# Patient Record
Sex: MALE | Race: WHITE | Employment: OTHER | URBAN - METROPOLITAN AREA
[De-identification: names, ages, dates, MRNs, and addresses within clinical notes are randomized per-mention and may not be internally consistent; named-entity substitution may affect disease eponyms.]

---

## 2016-05-11 LAB — PSA SERPL-MCNC: 4.7 NG/ML

## 2016-12-07 LAB — PSA SERPL-MCNC: 5.7 NG/ML

## 2017-05-19 ENCOUNTER — HOSPITAL ENCOUNTER (OUTPATIENT)
Dept: LAB | Age: 77
Discharge: HOME | End: 2017-05-19
Payer: COMMERCIAL

## 2017-05-19 ENCOUNTER — OFFICE VISIT (OUTPATIENT)
Dept: FAMILY MEDICINE CLINIC | Age: 77
End: 2017-05-19

## 2017-05-19 VITALS
DIASTOLIC BLOOD PRESSURE: 80 MMHG | TEMPERATURE: 98 F | OXYGEN SATURATION: 97 % | RESPIRATION RATE: 17 BRPM | HEART RATE: 57 BPM | WEIGHT: 214 LBS | HEIGHT: 67 IN | BODY MASS INDEX: 33.59 KG/M2 | SYSTOLIC BLOOD PRESSURE: 130 MMHG

## 2017-05-19 DIAGNOSIS — E11.9 CONTROLLED TYPE 2 DIABETES MELLITUS WITHOUT COMPLICATION, WITHOUT LONG-TERM CURRENT USE OF INSULIN (HCC): ICD-10-CM

## 2017-05-19 DIAGNOSIS — I10 ESSENTIAL HYPERTENSION: ICD-10-CM

## 2017-05-19 DIAGNOSIS — Z12.83 SKIN CANCER SCREENING: ICD-10-CM

## 2017-05-19 DIAGNOSIS — R97.20 ELEVATED PSA: ICD-10-CM

## 2017-05-19 DIAGNOSIS — N18.30 CKD (CHRONIC KIDNEY DISEASE) STAGE 3, GFR 30-59 ML/MIN (HCC): ICD-10-CM

## 2017-05-19 DIAGNOSIS — E11.9 CONTROLLED TYPE 2 DIABETES MELLITUS WITHOUT COMPLICATION, WITHOUT LONG-TERM CURRENT USE OF INSULIN (HCC): Primary | ICD-10-CM

## 2017-05-19 DIAGNOSIS — Z00.00 ROUTINE GENERAL MEDICAL EXAMINATION AT A HEALTH CARE FACILITY: ICD-10-CM

## 2017-05-19 DIAGNOSIS — Z12.11 SCREEN FOR COLON CANCER: ICD-10-CM

## 2017-05-19 DIAGNOSIS — N40.1 BENIGN PROSTATIC HYPERPLASIA WITH LOWER URINARY TRACT SYMPTOMS, UNSPECIFIED MORPHOLOGY: ICD-10-CM

## 2017-05-19 LAB
ALBUMIN SERPL BCP-MCNC: 4.4 G/DL (ref 3.4–5)
ALBUMIN/GLOB SERPL: 1.5 {RATIO} (ref 0.8–1.7)
ALP SERPL-CCNC: 51 U/L (ref 45–117)
ALT SERPL-CCNC: 23 U/L (ref 16–61)
ANION GAP BLD CALC-SCNC: 11 MMOL/L (ref 3–18)
AST SERPL W P-5'-P-CCNC: 17 U/L (ref 15–37)
BILIRUB SERPL-MCNC: 0.6 MG/DL (ref 0.2–1)
BUN SERPL-MCNC: 48 MG/DL (ref 7–18)
BUN/CREAT SERPL: 25 (ref 12–20)
CALCIUM SERPL-MCNC: 10.1 MG/DL (ref 8.5–10.1)
CHLORIDE SERPL-SCNC: 107 MMOL/L (ref 100–108)
CHOLEST SERPL-MCNC: 167 MG/DL
CO2 SERPL-SCNC: 23 MMOL/L (ref 21–32)
CREAT SERPL-MCNC: 1.91 MG/DL (ref 0.6–1.3)
ERYTHROCYTE [DISTWIDTH] IN BLOOD BY AUTOMATED COUNT: 13.4 % (ref 11.6–14.5)
GLOBULIN SER CALC-MCNC: 3 G/DL (ref 2–4)
GLUCOSE SERPL-MCNC: 71 MG/DL (ref 74–99)
HBA1C MFR BLD: 7.3 % (ref 4.2–5.6)
HCT VFR BLD AUTO: 43.9 % (ref 36–48)
HDLC SERPL-MCNC: 53 MG/DL (ref 40–60)
HDLC SERPL: 3.2 {RATIO} (ref 0–5)
HGB BLD-MCNC: 14.1 G/DL (ref 13–16)
LDLC SERPL CALC-MCNC: 99.6 MG/DL (ref 0–100)
LIPID PROFILE,FLP: NORMAL
MCH RBC QN AUTO: 29.7 PG (ref 24–34)
MCHC RBC AUTO-ENTMCNC: 32.1 G/DL (ref 31–37)
MCV RBC AUTO: 92.4 FL (ref 74–97)
PLATELET # BLD AUTO: 221 K/UL (ref 135–420)
PMV BLD AUTO: 10.4 FL (ref 9.2–11.8)
POTASSIUM SERPL-SCNC: 5.8 MMOL/L (ref 3.5–5.5)
PROT SERPL-MCNC: 7.4 G/DL (ref 6.4–8.2)
RBC # BLD AUTO: 4.75 M/UL (ref 4.7–5.5)
SODIUM SERPL-SCNC: 141 MMOL/L (ref 136–145)
TRIGL SERPL-MCNC: 72 MG/DL (ref ?–150)
VLDLC SERPL CALC-MCNC: 14.4 MG/DL
WBC # BLD AUTO: 6.6 K/UL (ref 4.6–13.2)

## 2017-05-19 PROCEDURE — 84153 ASSAY OF PSA TOTAL: CPT | Performed by: INTERNAL MEDICINE

## 2017-05-19 PROCEDURE — 84154 ASSAY OF PSA FREE: CPT | Performed by: INTERNAL MEDICINE

## 2017-05-19 PROCEDURE — 80061 LIPID PANEL: CPT | Performed by: INTERNAL MEDICINE

## 2017-05-19 PROCEDURE — 36415 COLL VENOUS BLD VENIPUNCTURE: CPT | Performed by: INTERNAL MEDICINE

## 2017-05-19 PROCEDURE — 80053 COMPREHEN METABOLIC PANEL: CPT | Performed by: INTERNAL MEDICINE

## 2017-05-19 PROCEDURE — 83036 HEMOGLOBIN GLYCOSYLATED A1C: CPT | Performed by: INTERNAL MEDICINE

## 2017-05-19 PROCEDURE — 82043 UR ALBUMIN QUANTITATIVE: CPT | Performed by: INTERNAL MEDICINE

## 2017-05-19 PROCEDURE — 85027 COMPLETE CBC AUTOMATED: CPT | Performed by: INTERNAL MEDICINE

## 2017-05-19 RX ORDER — FUROSEMIDE 40 MG/1
40 TABLET ORAL DAILY
Qty: 30 TAB | Refills: 1 | Status: SHIPPED | OUTPATIENT
Start: 2017-05-19 | End: 2018-01-09 | Stop reason: SDUPTHER

## 2017-05-19 RX ORDER — NEBIVOLOL 10 MG/1
10 TABLET ORAL DAILY
Qty: 90 TAB | Refills: 1 | Status: SHIPPED | OUTPATIENT
Start: 2017-05-19 | End: 2017-12-25 | Stop reason: SDUPTHER

## 2017-05-19 RX ORDER — FLUTICASONE PROPIONATE 50 MCG
2 SPRAY, SUSPENSION (ML) NASAL DAILY
COMMUNITY
End: 2017-08-08 | Stop reason: SDUPTHER

## 2017-05-19 RX ORDER — GLIMEPIRIDE 4 MG/1
TABLET ORAL 2 TIMES DAILY
COMMUNITY
End: 2017-05-19 | Stop reason: SDUPTHER

## 2017-05-19 RX ORDER — GLIMEPIRIDE 4 MG/1
4 TABLET ORAL 2 TIMES DAILY
Qty: 90 TAB | Refills: 0 | Status: SHIPPED | OUTPATIENT
Start: 2017-05-19 | End: 2017-05-22 | Stop reason: SDUPTHER

## 2017-05-19 RX ORDER — SODIUM BICARBONATE 325 MG/1
325 TABLET ORAL DAILY
COMMUNITY
End: 2017-10-26 | Stop reason: SDUPTHER

## 2017-05-19 RX ORDER — LISINOPRIL 20 MG/1
TABLET ORAL DAILY
COMMUNITY
End: 2017-05-19 | Stop reason: SDUPTHER

## 2017-05-19 RX ORDER — GUAIFENESIN 100 MG/5ML
81 LIQUID (ML) ORAL DAILY
COMMUNITY

## 2017-05-19 RX ORDER — HYDRALAZINE HYDROCHLORIDE 100 MG/1
100 TABLET, FILM COATED ORAL 2 TIMES DAILY
Qty: 180 TAB | Refills: 0 | Status: SHIPPED | OUTPATIENT
Start: 2017-05-19 | End: 2017-08-16 | Stop reason: SDUPTHER

## 2017-05-19 RX ORDER — ALBUTEROL SULFATE 90 UG/1
AEROSOL, METERED RESPIRATORY (INHALATION)
COMMUNITY
End: 2018-06-06 | Stop reason: SDUPTHER

## 2017-05-19 RX ORDER — NEBIVOLOL 10 MG/1
TABLET ORAL DAILY
COMMUNITY
End: 2017-05-19 | Stop reason: SDUPTHER

## 2017-05-19 RX ORDER — PIOGLITAZONEHYDROCHLORIDE 30 MG/1
30 TABLET ORAL DAILY
Qty: 90 TAB | Refills: 0 | Status: SHIPPED | OUTPATIENT
Start: 2017-05-19 | End: 2017-08-07 | Stop reason: SDUPTHER

## 2017-05-19 RX ORDER — LISINOPRIL 20 MG/1
20 TABLET ORAL DAILY
Qty: 90 TAB | Refills: 1 | Status: SHIPPED | OUTPATIENT
Start: 2017-05-19 | End: 2017-12-03 | Stop reason: SDUPTHER

## 2017-05-19 RX ORDER — SILODOSIN 8 MG/1
8 CAPSULE ORAL
COMMUNITY
End: 2017-07-17 | Stop reason: ALTCHOICE

## 2017-05-19 RX ORDER — FUROSEMIDE 40 MG/1
TABLET ORAL DAILY
COMMUNITY
End: 2017-05-19 | Stop reason: SDUPTHER

## 2017-05-19 RX ORDER — ASCORBIC ACID 500 MG
1000 TABLET ORAL
COMMUNITY

## 2017-05-19 RX ORDER — HYDRALAZINE HYDROCHLORIDE 100 MG/1
TABLET, FILM COATED ORAL 2 TIMES DAILY
COMMUNITY
End: 2017-05-19 | Stop reason: SDUPTHER

## 2017-05-19 RX ORDER — PIOGLITAZONEHYDROCHLORIDE 30 MG/1
TABLET ORAL DAILY
COMMUNITY
End: 2017-05-19 | Stop reason: SDUPTHER

## 2017-05-19 RX ORDER — ATORVASTATIN CALCIUM 10 MG/1
TABLET, FILM COATED ORAL DAILY
COMMUNITY
End: 2017-05-19 | Stop reason: SDUPTHER

## 2017-05-19 RX ORDER — ATORVASTATIN CALCIUM 10 MG/1
10 TABLET, FILM COATED ORAL DAILY
Qty: 90 TAB | Refills: 1 | Status: SHIPPED | OUTPATIENT
Start: 2017-05-19 | End: 2017-10-13 | Stop reason: SDUPTHER

## 2017-05-19 NOTE — PROGRESS NOTES
1. Have you been to the ER, urgent care clinic since your last visit? Hospitalized since your last visit? No       2. Have you seen or consulted any other health care providers outside of the [address] [place] [address] since your last visit? Include any pap smears or colon screening. Yes, kidney specialists, ortho ,chiro, endo and internal medicine      Pt is fasting.

## 2017-05-19 NOTE — MR AVS SNAPSHOT
Visit Information Date & Time Provider Department Dept. Phone Encounter #  
 5/19/2017  9:15 AM Tamara , 3 [facility] [phone] [id number] Upcoming Health Maintenance Date Due  
 GLAUCOMA SCREENING Q2Y 6/2/2006 Pneumococcal 65+ Low/Medium Risk (1 of 2 - PCV13) 6/2/2006 MEDICARE YEARLY EXAM 6/2/2006 INFLUENZA AGE 9 TO ADULT 8/1/2017 DTaP/Tdap/Td series (2 - Td) 5/19/2027 Allergies as of 5/19/2017  Review Complete On: 5/19/2017 By: Tamara Choi MD  
  
 Severity Noted Reaction Type Reactions Norvasc [Amlodipine]  05/19/2017    Swelling Current Immunizations  Never Reviewed No immunizations on file. Not reviewed this visit You Were Diagnosed With   
  
 Codes Comments Controlled type 2 diabetes mellitus without complication, without long-term current use of insulin ([facility]    -  Primary ICD-10-CM: E11.9 ICD-9-CM: 250.00 Routine general medical examination at a health care facility     ICD-10-CM: Z00.00 ICD-9-CM: V70.0 CKD (chronic kidney disease) stage 3, GFR 30-59 ml/min     ICD-10-CM: N18.3 ICD-9-CM: 664. 3 Essential hypertension     ICD-10-CM: I10 
ICD-9-CM: 401.9 Elevated PSA     ICD-10-CM: R97.20 ICD-9-CM: 790.93 Skin cancer screening     ICD-10-CM: Z12.83 ICD-9-CM: V76.43 Benign prostatic hyperplasia with lower urinary tract symptoms, unspecified morphology     ICD-10-CM: N40.1 ICD-9-CM: 600.01 Screen for colon cancer     ICD-10-CM: Z12.11 ICD-9-CM: V76.51 Vitals BP Pulse Temp Resp Height(growth percentile) Weight(growth percentile) 130/80 (!) 57 98 °F (36.7 °C) (Oral) 17 5' 7.33\" (1.71 m) 214 lb (97.1 kg) SpO2 BMI Smoking Status 97% 33.19 kg/m2 Never Smoker Vitals History BMI and BSA Data Body Mass Index Body Surface Area  
 33.19 kg/m 2 2.15 m 2 Your Updated Medication List  
  
   
 This list is accurate as of: 5/19/17 10:13 AM.  Always use your most recent med list.  
  
  
  
  
 atorvastatin 10 mg tablet Commonly known as:  LIPITOR Take  by mouth daily. We Performed the Following REFERRAL TO DERMATOLOGY [REF19 Custom] REFERRAL TO ENDOCRINOLOGY [SSX99 Custom] Comments:  
 Please evaluate patient for DM2. REFERRAL TO GASTROENTEROLOGY [YOD42 Custom] REFERRAL TO NEPHROLOGY [EYG16 Custom] Comments:  
 Please evaluate patient for CKD in setting of DM and HTN. REFERRAL TO UROLOGY [PAI280 Custom] To-Do List   
 05/19/2017 Lab:  PSA W/ REFLX FREE PSA Referral Information Referral ID Referred By Referred To  
  
 [id number] [facility], [facility], [address] [place] [address] [address] [place] [address] Phone: [phone] Fax: [phone] Visits Status Start Date End Date 1 New Request 5/19/17 5/19/18 If your referral has a status of pending review or denied, additional information will be sent to support the outcome of this decision. Referral ID Referred By Referred To  
 [id number] Doc Lee V Not Available Visits Status Start Date End Date 1 New Request 5/19/17 5/19/18 If your referral has a status of pending review or denied, additional information will be sent to support the outcome of this decision. Referral ID Referred By Referred To  
 [id number] [facility], [address], [place]  
   [address] [place] [address] Phone: [phone] Fax: [phone] Visits Status Start Date End Date 1 New Request 5/19/17 5/19/18 If your referral has a status of pending review or denied, additional information will be sent to support the outcome of this decision. Referral ID Referred By Referred To  
 [id number] Doc Lee V Not Available Visits Status Start Date End Date 1 New Request 5/19/17 5/19/18 If your referral has a status of pending review or denied, additional information will be sent to support the outcome of this decision. Referral ID Referred By Referred To  
 [id number] [facility]  
   [address] [facility], [address] [place] [address] Phone: [phone] Fax: [phone] Visits Status Start Date End Date 1 New Request 5/19/17 5/19/18 If your referral has a status of pending review or denied, additional information will be sent to support the outcome of this decision. Introducing [facility]! Anuj Rg introduces [employer] patient portal. Now you can access parts of your medical record, email your doctor's office, and request medication refills online. 1. In your internet browser, go to [email or web address] 2. Click on the First Time User? Click Here link in the Sign In box. You will see the New Member Sign Up page. 3. Enter your [employer] Access Code exactly as it appears below. You will not need to use this code after youve completed the sign-up process. If you do not sign up before the expiration date, you must request a new code. · [employer] Access Code: [email or web address] Expires: 8/17/2017  8:28 AM 
 
4. Enter the last four digits of your Social Security Number (xxxx) and Date of Birth (mm/dd/yyyy) as indicated and click Submit. You will be taken to the next sign-up page. 5. Create a [employer] ID. This will be your [employer] login ID and cannot be changed, so think of one that is secure and easy to remember. 6. Create a [employer] password. You can change your password at any time. 7. Enter your Password Reset Question and Answer. This can be used at a later time if you forget your password. 8. Enter your e-mail address. You will receive e-mail notification when new information is available in [address] 9. Click Sign Up. You can now view and download portions of your medical record. 10. Click the Download Summary menu link to download a portable copy of your medical information. If you have questions, please visit the Frequently Asked Questions section of the [employer] website. Remember, [employer] is NOT to be used for urgent needs. For medical emergencies, dial 911. Now available from your iPhone and Android! Please provide this summary of care documentation to your next provider. Your primary care clinician is listed as Guy 13. If you have any questions after today's visit, please call [phone].

## 2017-05-19 NOTE — PROGRESS NOTES
Assessment/Plan:    Jessica Ramirez was seen today for establish care. Diagnoses and all orders for this visit:    Controlled type 2 diabetes mellitus without complication, without long-term current use of insulin ([address]- appear to be well controlled. Need ophtho eye note from 2/2017- pt to call with doctor name so we can request records  -     METABOLIC PANEL, COMPREHENSIVE; Future  -     LIPID PANEL; Future  -     CBC W/O DIFF; Future  -     HEMOGLOBIN A1C W/O EAG; Future  -     MICROALBUMIN, UR, RAND W/ MICROALBUMIN/CREA RATIO; Future  -     REFERRAL TO ENDOCRINOLOGY    Routine general medical examination at a health care facility  -     METABOLIC PANEL, COMPREHENSIVE; Future  -     LIPID PANEL; Future  -     CBC W/O DIFF; Future    CKD (chronic kidney disease) stage 3, GFR 30-59 ml/min  -     REFERRAL TO NEPHROLOGY    Essential hypertension- cont current regimen. Elevated PSA- reportedly biopsy neg. Get records. -     PSA W/ REFLX FREE PSA; Future  -     REFERRAL TO UROLOGY    Skin cancer screening  -     REFERRAL TO DERMATOLOGY    Benign prostatic hyperplasia with lower urinary tract symptoms, unspecified morphology  -     REFERRAL TO UROLOGY      The plan was discussed with the patient. The patient verbalized understanding and is in agreement with the plan. All medication potential side effects were discussed with the patient. Health Maintenance:   Health Maintenance   Topic Date Due    GLAUCOMA SCREENING Q2Y  06/02/2006    Pneumococcal 65+ Low/Medium Risk (1 of 2 - PCV13) 06/02/2006    MEDICARE YEARLY EXAM  06/02/2006    INFLUENZA AGE 9 TO ADULT  08/01/2017    DTaP/Tdap/Td series (2 - Td) 05/19/2027    ZOSTER VACCINE AGE 60>  Addressed       Elvis Cole is a 77 y.o.  male and presents with Establish Care     Subjective:  Pt is here to establish care. DM2-  On amaryl, januvia, pioglitazone. Fasting bs was 68 this am.  bs ranging 80-90 fasting. Last eye exam was 2/2017 [facility].  [facility] CKD - was being followed by nephrology in [facility]. HTN - on bystolic, hydralazine, lisinopril. bp well controlled. Intol of norvasc b/c of swelling. Has h/o elevated psa. Reportedly had biopsies that were negative from 1/2017. Has a slow urinary stream.  Empties bladder fully. +dribbling. On rapaflo- doesn't feel sx are completely controlled. HLD - on statin. No side effects. ROS:  Constitutional: No recent weight change. No weakness/fatigue. No f/c. Skin: No rashes, change in nails/hair, itching   HENT: No HA, dizziness. No hearing loss/tinnitus. No nasal congestion/discharge. Eyes: No change in vision, double/blurred vision or eye pain/redness. Cardiovascular: No CP/palpitations. No TORO/orthopnea/PND. Respiratory: No cough/sputum, dyspnea, wheezing. Gastointestinal: No dysphagia, reflux. No n/v. No constipation/diarrhea. No melena/rectal bleeding. Genitourinary: No dysuria, urinary hesitancy, nocturia, hematuria. No incontinence. Musculoskeletal: No joint pain/stiffness. No muscle pain/tenderness. Endo: No heat/cold intolerance, no polyuria/polydypsia. Heme: No h/o anemia. No easy bleeding/bruising. Allergy/Immunology: No seasonal rhinitis. Denies frequent colds, sinus/ear infections. Neurological: No seizures/numbness/weakness. No paresthesias. Psychiatric:  No depression, anxiety.    PMH:  Past Medical History:   Diagnosis Date    Calculus of kidney     Chronic kidney disease     [facility]     Hypercholesterolemia     Hypertension        PSH:  Past Surgical History:   Procedure Laterality Date    HX APPENDECTOMY      16years old    Orien Haus HX [de-identified]  80s    r shoulder        SH:  Social History   Substance Use Topics    Smoking status: Never Smoker    Smokeless tobacco: Never Used    Alcohol use No       FH:  Family History   Problem Relation Age of Onset    Kidney Disease Mother     Stroke Father        Medications/Allergies:  No current outpatient prescriptions on file. No Known Allergies    Objective:  Visit Vitals    /80    Pulse (!) 57    Temp 98 °F (36.7 °C) (Oral)    Resp 17    Ht 5' 7.33\" (1.71 m)    Wt 214 lb (97.1 kg)    SpO2 97%    BMI 33.19 kg/m2      Constitutional: Well developed, nourished, no distress, alert, obese habitus   HENT: Exterior ears and tympanic membranes normal bilaterally. Supple neck. No thyromegaly or lymphadenopathy. Oropharynx clear and moist mucous membranes. Eyes: Conjunctiva normal. PERRL. Cardiovascular: S1, S2.  RRR. No murmurs/rubs. No thrills palpated. No carotid bruits. Intact distal pulses. No edema. Pulmonary/Chest Wall: No abnormalities on inspection. Clear to auscultation bilaterally. No wheezing/rhonchi. Normal effort. GI: Soft, nontender, nondistended. Normal active bowel sounds. No  masses on palpation. No hepatosplenomegaly. Musculoskeletal: Gait normal.  Joints without deformity/tenderness. Neurological: Appropriate. No focal motor or sensory deficits. Speech normal.   Skin: No lesions/rashes on inspection. Psych: Appropriate affect, judgement and insight. Short-term memory intact.

## 2017-05-20 LAB
% FREE PSA, 480797: 43.5 %
CREAT UR-MCNC: 191.52 MG/DL (ref 30–125)
MICROALBUMIN UR-MCNC: 33.7 MG/DL (ref 0–3)
MICROALBUMIN/CREAT UR-RTO: 176 MG/G (ref 0–30)
PSA SERPL-MCNC: 4.8 NG/ML (ref 0–4)
PSA, FREE, 480853: 2.09 NG/ML
REFLEX CRITERIA: ABNORMAL

## 2017-05-22 DIAGNOSIS — E11.9 CONTROLLED TYPE 2 DIABETES MELLITUS WITHOUT COMPLICATION, WITHOUT LONG-TERM CURRENT USE OF INSULIN (HCC): ICD-10-CM

## 2017-05-22 DIAGNOSIS — E87.5 HYPERKALEMIA: Primary | ICD-10-CM

## 2017-05-22 PROBLEM — N40.1 BENIGN PROSTATIC HYPERPLASIA WITH LOWER URINARY TRACT SYMPTOMS: Status: ACTIVE | Noted: 2017-05-22

## 2017-05-22 PROBLEM — I10 ESSENTIAL HYPERTENSION: Status: ACTIVE | Noted: 2017-05-22

## 2017-05-22 PROBLEM — N18.30 CKD (CHRONIC KIDNEY DISEASE) STAGE 3, GFR 30-59 ML/MIN (HCC): Status: ACTIVE | Noted: 2017-05-22

## 2017-05-22 PROBLEM — R97.20 ELEVATED PSA: Status: ACTIVE | Noted: 2017-05-22

## 2017-05-22 RX ORDER — GLIMEPIRIDE 4 MG/1
4 TABLET ORAL
Qty: 90 TAB | Refills: 0 | Status: SHIPPED | OUTPATIENT
Start: 2017-05-22 | End: 2018-01-06 | Stop reason: SDUPTHER

## 2017-05-22 NOTE — PROGRESS NOTES
Tell pt his labs showed his prostate level is mildlly high. I'd like him to see the urologist I referred him to for f/u on that issue. His DM is controlled. His potassium was a little high, so I want him to come back this week for a repeat.

## 2017-05-22 NOTE — PROGRESS NOTES
Call placed to patient, notified. Patient's wife states patient has been exercising and behaving so his BS in the am is 60-80's. Would like to know if meds need to be adjusted.

## 2017-05-26 ENCOUNTER — HOSPITAL ENCOUNTER (OUTPATIENT)
Dept: LAB | Age: 77
Discharge: HOME | End: 2017-05-26
Payer: COMMERCIAL

## 2017-05-26 DIAGNOSIS — E87.5 HYPERKALEMIA: ICD-10-CM

## 2017-05-26 LAB — POTASSIUM SERPL-SCNC: 5.1 MMOL/L (ref 3.5–5.5)

## 2017-05-26 PROCEDURE — 84132 ASSAY OF SERUM POTASSIUM: CPT | Performed by: INTERNAL MEDICINE

## 2017-05-26 PROCEDURE — 36415 COLL VENOUS BLD VENIPUNCTURE: CPT | Performed by: INTERNAL MEDICINE

## 2017-06-01 PROBLEM — E53.8 B12 DEFICIENCY: Status: ACTIVE | Noted: 2017-06-01

## 2017-07-18 ENCOUNTER — TELEPHONE (OUTPATIENT)
Dept: FAMILY MEDICINE CLINIC | Age: 77
End: 2017-07-18

## 2017-07-18 NOTE — TELEPHONE ENCOUNTER
[facility] called to report that they have tried several times to contact the pt and he has not called back to schedule an appt.

## 2017-08-08 RX ORDER — FLUTICASONE PROPIONATE 50 MCG
2 SPRAY, SUSPENSION (ML) NASAL DAILY
Qty: 1 BOTTLE | Refills: 0 | Status: SHIPPED | OUTPATIENT
Start: 2017-08-08 | End: 2017-12-18 | Stop reason: SDUPTHER

## 2017-08-16 DIAGNOSIS — I10 ESSENTIAL HYPERTENSION: ICD-10-CM

## 2017-08-17 RX ORDER — HYDRALAZINE HYDROCHLORIDE 50 MG/1
50 TABLET, FILM COATED ORAL 2 TIMES DAILY
Qty: 180 TAB | Refills: 0 | Status: SHIPPED | OUTPATIENT
Start: 2017-08-17 | End: 2018-01-06 | Stop reason: SDUPTHER

## 2017-10-13 DIAGNOSIS — E11.9 CONTROLLED TYPE 2 DIABETES MELLITUS WITHOUT COMPLICATION, WITHOUT LONG-TERM CURRENT USE OF INSULIN (HCC): ICD-10-CM

## 2017-10-16 RX ORDER — ATORVASTATIN CALCIUM 10 MG/1
TABLET, FILM COATED ORAL
Qty: 90 TAB | Refills: 0 | Status: SHIPPED | OUTPATIENT
Start: 2017-10-16 | End: 2018-01-06 | Stop reason: SDUPTHER

## 2017-10-16 RX ORDER — SITAGLIPTIN 50 MG/1
TABLET, FILM COATED ORAL
Qty: 90 TAB | Refills: 0 | Status: SHIPPED | OUTPATIENT
Start: 2017-10-16 | End: 2018-01-06 | Stop reason: SDUPTHER

## 2017-10-24 RX ORDER — SODIUM BICARBONATE 325 MG/1
325 TABLET ORAL DAILY
OUTPATIENT
Start: 2017-10-24

## 2017-10-26 RX ORDER — SODIUM BICARBONATE 325 MG/1
325 TABLET ORAL DAILY
Qty: 30 TAB | Refills: 0 | Status: SHIPPED | OUTPATIENT
Start: 2017-10-26 | End: 2017-10-31 | Stop reason: SDUPTHER

## 2017-10-26 NOTE — TELEPHONE ENCOUNTER
Called pt. He has not set up with a kidney specialist here yet. His last visit was 1 year ago at his nephrologist in [facility]. Please send temporary fill and add a referral to [facility]. Pt will call for appt.

## 2017-10-31 NOTE — TELEPHONE ENCOUNTER
Patients wife is calling stating that the prescription for the sodium Bicarbonate 325mg for a 90 day supply was sent to [facility] . Called [facility] and they stated that this medication is an OTC and they do not carry this. Patient would have to have the script sent over to the [address] [place] [address] (p) [phone].

## 2017-11-01 NOTE — TELEPHONE ENCOUNTER
Pt wife calling again wanting to know when prescription will be sent to [facility]. She states he is completely out of medication and [facility] will not fill and it needs to be sent to [facility] as soon as possible. Pt would like a call back from nurse.  Please f/u

## 2017-11-02 RX ORDER — SODIUM BICARBONATE 325 MG/1
325 TABLET ORAL DAILY
Qty: 90 TAB | Refills: 0 | Status: SHIPPED | OUTPATIENT
Start: 2017-11-02 | End: 2019-09-04 | Stop reason: SDUPTHER

## 2017-12-01 ENCOUNTER — TELEPHONE (OUTPATIENT)
Dept: FAMILY MEDICINE CLINIC | Age: 77
End: 2017-12-01

## 2017-12-01 DIAGNOSIS — E53.8 B12 DEFICIENCY: ICD-10-CM

## 2017-12-01 DIAGNOSIS — E11.9 CONTROLLED TYPE 2 DIABETES MELLITUS WITHOUT COMPLICATION, WITHOUT LONG-TERM CURRENT USE OF INSULIN (HCC): Primary | ICD-10-CM

## 2017-12-01 DIAGNOSIS — R97.20 ELEVATED PSA: ICD-10-CM

## 2017-12-01 DIAGNOSIS — I10 ESSENTIAL HYPERTENSION: ICD-10-CM

## 2017-12-01 DIAGNOSIS — N18.30 CKD (CHRONIC KIDNEY DISEASE) STAGE 3, GFR 30-59 ML/MIN (HCC): ICD-10-CM

## 2017-12-01 NOTE — TELEPHONE ENCOUNTER
Pt called and would like his blood work to be ordered. The last he had bw done was 5/19/17. Please review and order accordingly.

## 2017-12-03 DIAGNOSIS — I10 ESSENTIAL HYPERTENSION: ICD-10-CM

## 2017-12-04 RX ORDER — LISINOPRIL 20 MG/1
TABLET ORAL
Qty: 90 TAB | Refills: 0 | Status: SHIPPED | OUTPATIENT
Start: 2017-12-04 | End: 2017-12-05 | Stop reason: SDUPTHER

## 2017-12-05 DIAGNOSIS — I10 ESSENTIAL HYPERTENSION: ICD-10-CM

## 2017-12-05 RX ORDER — LISINOPRIL 10 MG/1
TABLET ORAL
COMMUNITY
Start: 2017-12-05 | End: 2018-06-28 | Stop reason: ALTCHOICE

## 2017-12-06 ENCOUNTER — TELEPHONE (OUTPATIENT)
Dept: FAMILY MEDICINE CLINIC | Age: 77
End: 2017-12-06

## 2017-12-06 NOTE — TELEPHONE ENCOUNTER
Called in reporting that they do not take this patient's insurance, he has a medicare advantage plan. Will need to be set up elsewhere.

## 2017-12-08 NOTE — TELEPHONE ENCOUNTER
Pt called stating he went to his appt at [facility] and was told they do not accept his insurance. Pt is requesting a referral to a new endocrinologist if possible.

## 2017-12-12 NOTE — TELEPHONE ENCOUNTER
Called Dr. Karina Benavides office. They will review the card and let us know if they accept his insurance. p [phone].

## 2017-12-18 RX ORDER — FLUTICASONE PROPIONATE 50 MCG
2 SPRAY, SUSPENSION (ML) NASAL DAILY
Qty: 1 BOTTLE | Refills: 0 | Status: SHIPPED | OUTPATIENT
Start: 2017-12-18 | End: 2018-01-06 | Stop reason: SDUPTHER

## 2017-12-21 ENCOUNTER — OFFICE VISIT (OUTPATIENT)
Dept: FAMILY MEDICINE CLINIC | Age: 77
End: 2017-12-21

## 2017-12-21 ENCOUNTER — HOSPITAL ENCOUNTER (OUTPATIENT)
Dept: LAB | Age: 77
Discharge: HOME | End: 2017-12-21
Payer: COMMERCIAL

## 2017-12-21 VITALS
BODY MASS INDEX: 34.81 KG/M2 | OXYGEN SATURATION: 99 % | TEMPERATURE: 98 F | HEART RATE: 53 BPM | DIASTOLIC BLOOD PRESSURE: 60 MMHG | WEIGHT: 221.8 LBS | HEIGHT: 67 IN | RESPIRATION RATE: 18 BRPM | SYSTOLIC BLOOD PRESSURE: 118 MMHG

## 2017-12-21 DIAGNOSIS — I10 ESSENTIAL HYPERTENSION: ICD-10-CM

## 2017-12-21 DIAGNOSIS — Z23 ENCOUNTER FOR IMMUNIZATION: ICD-10-CM

## 2017-12-21 DIAGNOSIS — L30.9 DERMATITIS: ICD-10-CM

## 2017-12-21 DIAGNOSIS — E66.09 CLASS 1 OBESITY DUE TO EXCESS CALORIES WITH SERIOUS COMORBIDITY AND BODY MASS INDEX (BMI) OF 34.0 TO 34.9 IN ADULT: ICD-10-CM

## 2017-12-21 DIAGNOSIS — E11.9 CONTROLLED TYPE 2 DIABETES MELLITUS WITHOUT COMPLICATION, WITHOUT LONG-TERM CURRENT USE OF INSULIN (HCC): Primary | ICD-10-CM

## 2017-12-21 DIAGNOSIS — E11.9 CONTROLLED TYPE 2 DIABETES MELLITUS WITHOUT COMPLICATION, WITHOUT LONG-TERM CURRENT USE OF INSULIN (HCC): ICD-10-CM

## 2017-12-21 DIAGNOSIS — E53.8 B12 DEFICIENCY: ICD-10-CM

## 2017-12-21 DIAGNOSIS — R97.20 ELEVATED PSA: ICD-10-CM

## 2017-12-21 DIAGNOSIS — H04.123 DRY EYES: ICD-10-CM

## 2017-12-21 DIAGNOSIS — N18.30 CKD (CHRONIC KIDNEY DISEASE) STAGE 3, GFR 30-59 ML/MIN (HCC): ICD-10-CM

## 2017-12-21 PROBLEM — K80.21 CALCULUS OF GALLBLADDER WITH BILIARY OBSTRUCTION BUT WITHOUT CHOLECYSTITIS: Status: ACTIVE | Noted: 2017-12-21

## 2017-12-21 PROBLEM — E11.21 TYPE 2 DIABETES MELLITUS WITH NEPHROPATHY (HCC): Status: ACTIVE | Noted: 2017-12-21

## 2017-12-21 PROBLEM — E11.21 DIABETIC NEPHROPATHY ASSOCIATED WITH TYPE 2 DIABETES MELLITUS (HCC): Status: ACTIVE | Noted: 2017-12-21

## 2017-12-21 PROBLEM — E11.21 TYPE 2 DIABETES MELLITUS WITH NEPHROPATHY (HCC): Status: RESOLVED | Noted: 2017-12-21 | Resolved: 2017-12-21

## 2017-12-21 LAB
ALBUMIN SERPL-MCNC: 4.4 G/DL (ref 3.4–5)
ALBUMIN/GLOB SERPL: 1.5 {RATIO} (ref 0.8–1.7)
ALP SERPL-CCNC: 55 U/L (ref 45–117)
ALT SERPL-CCNC: 23 U/L (ref 16–61)
ANION GAP SERPL CALC-SCNC: 11 MMOL/L (ref 3–18)
AST SERPL-CCNC: 14 U/L (ref 15–37)
BILIRUB SERPL-MCNC: 0.6 MG/DL (ref 0.2–1)
BUN SERPL-MCNC: 43 MG/DL (ref 7–18)
BUN/CREAT SERPL: 23 (ref 12–20)
CALCIUM SERPL-MCNC: 9.3 MG/DL (ref 8.5–10.1)
CHLORIDE SERPL-SCNC: 109 MMOL/L (ref 100–108)
CO2 SERPL-SCNC: 23 MMOL/L (ref 21–32)
CREAT SERPL-MCNC: 1.83 MG/DL (ref 0.6–1.3)
GLOBULIN SER CALC-MCNC: 3 G/DL (ref 2–4)
GLUCOSE SERPL-MCNC: 120 MG/DL (ref 74–99)
HBA1C MFR BLD HPLC: 7.2 %
HBA1C MFR BLD: 7.3 % (ref 4.2–5.6)
POTASSIUM SERPL-SCNC: 5.3 MMOL/L (ref 3.5–5.5)
PROT SERPL-MCNC: 7.4 G/DL (ref 6.4–8.2)
SODIUM SERPL-SCNC: 143 MMOL/L (ref 136–145)
VIT B12 SERPL-MCNC: >2000 PG/ML (ref 211–911)

## 2017-12-21 PROCEDURE — 84153 ASSAY OF PSA TOTAL: CPT | Performed by: INTERNAL MEDICINE

## 2017-12-21 PROCEDURE — 82607 VITAMIN B-12: CPT | Performed by: INTERNAL MEDICINE

## 2017-12-21 PROCEDURE — 83036 HEMOGLOBIN GLYCOSYLATED A1C: CPT | Performed by: INTERNAL MEDICINE

## 2017-12-21 PROCEDURE — 80053 COMPREHEN METABOLIC PANEL: CPT | Performed by: INTERNAL MEDICINE

## 2017-12-21 PROCEDURE — 84154 ASSAY OF PSA FREE: CPT | Performed by: INTERNAL MEDICINE

## 2017-12-21 PROCEDURE — 36415 COLL VENOUS BLD VENIPUNCTURE: CPT | Performed by: INTERNAL MEDICINE

## 2017-12-21 RX ORDER — TRIAMCINOLONE ACETONIDE 1 MG/G
CREAM TOPICAL 2 TIMES DAILY
Qty: 453 G | Refills: 0 | Status: SHIPPED | OUTPATIENT
Start: 2017-12-21 | End: 2018-04-12 | Stop reason: SDUPTHER

## 2017-12-21 RX ORDER — AZELASTINE HYDROCHLORIDE 0.5 MG/ML
1 SOLUTION/ DROPS OPHTHALMIC 2 TIMES DAILY
Qty: 6 ML | Refills: 0 | Status: SHIPPED | OUTPATIENT
Start: 2017-12-21 | End: 2018-07-09

## 2017-12-21 NOTE — MR AVS SNAPSHOT
Visit Information Date & Time Provider Department Dept. Phone Encounter #  
 12/21/2017  7:30 AM Nicanor Allison, [facility] [phone] [id number] Your Appointments 1/31/2018  8:30 AM  
Nurse Visit with Parisa Soliz [facility] NURSE [facility]. [address]) Appt Note: Return in about 6 months (around 1/17/2018) for PSA. [address] [facility] [facility] [address]  
  
    
 2/7/2018  8:15 AM  
ESTABLISHED PATIENT with Zaid Taylor MD  
[facility]. [address] [facility]) Appt Note: 6 month f/u  
 [address] [address]  
[phone]  
  
   
 [facility] [address] Upcoming Health Maintenance Date Due  
 EYE EXAM RETINAL OR DILATED Q1 6/2/1950 GLAUCOMA SCREENING Q2Y 6/2/2006 MEDICARE YEARLY EXAM 6/2/2006 Influenza Age 5 to Adult 8/1/2017 MICROALBUMIN Q1 5/19/2018 LIPID PANEL Q1 5/19/2018 HEMOGLOBIN A1C Q6M 6/21/2018 FOOT EXAM Q1 12/21/2018 DTaP/Tdap/Td series (2 - Td) 5/19/2027 Allergies as of 12/21/2017  Review Complete On: 12/21/2017 By: Nicanor Cooper MD  
  
 Severity Noted Reaction Type Reactions Norvasc [Amlodipine]  05/19/2017    Swelling Current Immunizations  Never Reviewed Name Date Influenza High Dose Vaccine PF  Incomplete Pneumococcal Conjugate (PCV-13) 11/7/2016 Pneumococcal Polysaccharide (PPSV-23) 5/1/2010 Zoster Vaccine, Live 10/1/2012 Not reviewed this visit You Were Diagnosed With   
  
 Codes Comments Controlled type 2 diabetes mellitus without complication, without long-term current use of insulin ([facility]    -  Primary ICD-10-CM: E11.9 ICD-9-CM: 250.00 Essential hypertension     ICD-10-CM: I10 
ICD-9-CM: 401.9  Class 1 obesity due to excess calories with serious comorbidity and body mass index (BMI) of 34.0 to 34.9 in adult     ICD-10-CM: E66.09, Z68.34 
ICD-9-CM: 278.00, V85.34 Encounter for immunization     ICD-10-CM: P06 ICD-9-CM: V03.89 Vitals BP Pulse Temp Resp Height(growth percentile) Weight(growth percentile)  
 118/60 (BP 1 Location: Left arm, BP Patient Position: Sitting) (!) 53 98 °F (36.7 °C) (Oral) 18 5' 7\" (1.702 m) 221 lb 12.8 oz (100.6 kg) SpO2 BMI Smoking Status 99% 34.74 kg/m2 Never Smoker Vitals History BMI and BSA Data Body Mass Index Body Surface Area 34.74 kg/m 2 2.18 m 2 Preferred Pharmacy Pharmacy Name Phone [facility] [address] Your Updated Medication List  
  
   
This list is accurate as of: 12/21/17  8:29 AM.  Always use your most recent med list.  
  
  
  
  
 aspirin 81 mg chewable tablet Take 81 mg by mouth daily. atorvastatin 10 mg tablet Commonly known as:  LIPITOR  
TAKE 1 TABLET BY MOUTH  DAILY  
  
 azelastine 0.05 % ophthalmic solution Commonly known as:  OPTIVAR Administer 1 Drop to both eyes two (2) times a day. Use in affected eye(s)  
  
 cranberry extract 450 mg Tab tablet Take  by mouth. fluticasone 50 mcg/actuation nasal spray Commonly known as:  Tana Fetch 2 Sprays by Both Nostrils route daily. furosemide 40 mg tablet Commonly known as:  LASIX Take 1 Tab by mouth daily. Indications: Monday Wednesday Friday  
  
 glimepiride 4 mg tablet Commonly known as:  AMARYL Take 1 Tab by mouth every morning. glucose blood VI test strips strip Commonly known as:  ONETOUCH ULTRA TEST Test sugars daily. E11.9  
  
 hydrALAZINE 50 mg tablet Commonly known as:  APRESOLINE Take 1 Tab by mouth two (2) times a day. JANUVIA 50 mg tablet Generic drug:  SITagliptin TAKE 1 TABLET BY MOUTH  DAILY  
  
 lisinopril 10 mg tablet Commonly known as:  Mela Belle  
 TAKE 1 TABLET BY MOUTH  DAILY  
  
 nebivolol 10 mg tablet Commonly known as:  BYSTOLIC Take 1 Tab by mouth daily. OTHER Indications: Osteo Bi-Flex 1 per day  
  
 pioglitazone 30 mg tablet Commonly known as:  ACTOS Take 1 tablet by mouth  daily  
  
 sodium bicarbonate 325 mg tablet Take 1 Tab by mouth daily. tamsulosin 0.4 mg capsule Commonly known as:  FLOMAX Take 1 Cap by mouth daily (after dinner). triamcinolone acetonide 0.1 % topical cream  
Commonly known as:  KENALOG Apply  to affected area two (2) times a day. use thin layer VENTOLIN HFA 90 mcg/actuation inhaler Generic drug:  albuterol Take  by inhalation. VITAMIN C 500 mg tablet Generic drug:  ascorbic acid (vitamin C) Take 1,000 mg by mouth. Prescriptions Sent to Pharmacy Refills  
 glucose blood VI test strips (ONETOUCH ULTRA TEST) strip 3 Sig: Test sugars daily. E11.9 Class: Normal  
 Pharmacy: [address] [place] [address] Ph #: [phone]  
 triamcinolone acetonide (KENALOG) 0.1 % topical cream 0 Sig: Apply  to affected area two (2) times a day. use thin layer Class: Normal  
 Pharmacy: [address] Ph #: [phone] Route: Topical  
 azelastine (OPTIVAR) 0.05 % ophthalmic solution 0 Sig: Administer 1 Drop to both eyes two (2) times a day. Use in affected eye(s) Class: Normal  
 Pharmacy: [address] Ph #: [phone] Route: Both Eyes We Performed the Following ADMIN INFLUENZA VIRUS VAC [ [facility]] AMB POC HEMOGLOBIN A1C [90947 CPT(R)]  DIABETES FOOT EXAM [HM7 Custom] INFLUENZA VIRUS VACCINE, HIGH DOSE SEASONAL, PRESERVATIVE FREE [88669 CPT(R)] Patient Instructions You have been referred to ophthalmology.   Please call one of the preferred providers listed below and schedule your appointment. Once you have scheduled your appointment, please call the office at [phone] and leave the details of your appointment (provider you will be seeing, appointment date and time) on the voice mail. [facility] Dr. Sandra Sumner [address]! Dear Janeth Patient: Thank you for requesting a [employer] account. Our records indicate that you already have an active [employer] account. You can access your account anytime at [email or web address] Did you know that you can access your hospital and ER discharge instructions at any time in [employer]? You can also review all of your test results from your hospital stay or ER visit. Additional Information If you have questions, please visit the Frequently Asked Questions section of the [employer] website at [email or web address] Remember, [employer] is NOT to be used for urgent needs. For medical emergencies, dial 911. Now available from your iPhone and Android! Please provide this summary of care documentation to your next provider. Your primary care clinician is listed as Guy Lin. If you have any questions after today's visit, please call [phone].

## 2017-12-21 NOTE — PROGRESS NOTES
Assessment/Plan:    1. Controlled type 2 diabetes mellitus without complication, without long-term current use of insulin (HCC)  -cont current regimen. Has endo appt 3/2018  -  DIABETES FOOT EXAM  - AMB POC HEMOGLOBIN A1C  - glucose blood VI test strips (ONETOUCH ULTRA TEST) strip; Test sugars daily. E11.9  Dispense: 100 Strip; Refill: 3    2. Essential hypertension  -cont current    3. Class 1 obesity due to excess calories with serious comorbidity and body mass index (BMI) of 34.0 to 34.9 in adult  -work on wt loss    4. Encounter for immunization  - Influenza virus vaccine ([address]) 72 years and older ([address])  - Administration fee () for Medicare insured patients    5. Dry eyes - azelastine. Try [facility] for [facility] appt    6. Dermatitis - trial kenalog  The plan was discussed with the patient. The patient verbalized understanding and is in agreement with the plan. All medication potential side effects were discussed with the patient. Health Maintenance:  Has eye appt 3/2018. Health Maintenance   Topic Date Due    EYE EXAM RETINAL OR DILATED Q1  06/02/1950    GLAUCOMA SCREENING Q2Y  06/02/2006    MEDICARE YEARLY EXAM  06/02/2006    Influenza Age 5 to Adult  08/01/2017    MICROALBUMIN Q1  05/19/2018    LIPID PANEL Q1  05/19/2018    HEMOGLOBIN A1C Q6M  06/21/2018    FOOT EXAM Q1  12/21/2018    DTaP/Tdap/Td series (2 - Td) 05/19/2027    ZOSTER VACCINE AGE 60>  Addressed    Pneumococcal 65+ Low/Medium Risk  Completed       Yoana Paris is a 69 y.o. male and presents with Diabetes     Subjective:  DM2 - A1c 7.2. Compliant with meds. HTN -bp controlled. Compliant with meds. No side effects. He notes a lack of \"endurance\" on treadmill (30min). Is working himself up. No cp or dyspnea. Is working on losing wt. He also c/o rash and itchy skin on back. He also c/o itching eyes. ROS:  Constitutional: No recent weight change. No weakness/fatigue. No f/c. Skin: No rashes, change in nails/hair, itching   HENT: No HA, dizziness. No hearing loss/tinnitus. No nasal congestion/discharge. Eyes: No change in vision, double/blurred vision or eye pain/redness. Cardiovascular: No CP/palpitations. No TORO/orthopnea/PND. Respiratory: No cough/sputum, dyspnea, wheezing. Gastointestinal: No dysphagia, reflux. No n/v. No constipation/diarrhea. No melena/rectal bleeding. Genitourinary: No dysuria, urinary hesitancy, nocturia, hematuria. No incontinence. Musculoskeletal: No joint pain/stiffness. No muscle pain/tenderness. Endo: No heat/cold intolerance, no polyuria/polydypsia. Heme: No h/o anemia. No easy bleeding/bruising. Allergy/Immunology: No seasonal rhinitis. Denies frequent colds, sinus/ear infections. Neurological: No seizures/numbness/weakness. No paresthesias. Psychiatric:  No depression, anxiety. The problem list was updated as a part of today's visit. Patient Active Problem List   Diagnosis Code    Controlled type 2 diabetes mellitus without complication, without long-term current use of insulin ([facility]) E11.9    CKD (chronic kidney disease) stage 3, GFR 30-59 ml/min N18.3    Essential hypertension I10    Elevated PSA R97.20    Benign prostatic hyperplasia with lower urinary tract symptoms N40.1    B12 deficiency E53.8       The PSH, FH were reviewed. SH:  Social History   Substance Use Topics    Smoking status: Never Smoker    Smokeless tobacco: Never Used    Alcohol use No       Medications/Allergies:  Current Outpatient Prescriptions on File Prior to Visit   Medication Sig Dispense Refill    fluticasone (FLONASE) 50 mcg/actuation nasal spray 2 Sprays by Both Nostrils route daily. 1 Bottle 0    lisinopril (PRINIVIL, ZESTRIL) 10 mg tablet TAKE 1 TABLET BY MOUTH  DAILY      sodium bicarbonate 325 mg tablet Take 1 Tab by mouth daily.  90 Tab 0    JANUVIA 50 mg tablet TAKE 1 TABLET BY MOUTH  DAILY 90 Tab 0    atorvastatin (LIPITOR) 10 mg tablet TAKE 1 TABLET BY MOUTH  DAILY 90 Tab 0    tamsulosin (FLOMAX) 0.4 mg capsule Take 1 Cap by mouth daily (after dinner). 90 Cap 3    hydrALAZINE (APRESOLINE) 50 mg tablet Take 1 Tab by mouth two (2) times a day. 180 Tab 0    pioglitazone (ACTOS) 30 mg tablet Take 1 tablet by mouth  daily 90 Tab 0    glimepiride (AMARYL) 4 mg tablet Take 1 Tab by mouth every morning. 90 Tab 0    albuterol (VENTOLIN HFA) 90 mcg/actuation inhaler Take  by inhalation.  aspirin 81 mg chewable tablet Take 81 mg by mouth daily.  cranberry extract 450 mg tab Take  by mouth.  ascorbic acid, vitamin C, (VITAMIN C) 500 mg tablet Take 1,000 mg by mouth.  OTHER Indications: Osteo Bi-Flex 1 per day      nebivolol (BYSTOLIC) 10 mg tablet Take 1 Tab by mouth daily. 90 Tab 1    furosemide (LASIX) 40 mg tablet Take 1 Tab by mouth daily. Indications: Monday Wednesday Friday 30 Tab 1     No current facility-administered medications on file prior to visit. Allergies   Allergen Reactions    Norvasc [Amlodipine] Swelling       Objective:  Visit Vitals    /60 (BP 1 Location: Left arm, BP Patient Position: Sitting)    Pulse (!) 53    Temp 98 °F (36.7 °C) (Oral)    Resp 18    Ht 5' 7\" (1.702 m)    Wt 221 lb 12.8 oz (100.6 kg)    SpO2 99%    BMI 34.74 kg/m2      Constitutional: Well developed, nourished, no distress, alert, obese habitus   CV: S1, S2.  RRR. No murmurs/rubs. No thrills palpated. No carotid bruits. Intact distal pulses. No edema. Pulm: No abnormalities on inspection. Clear to auscultation bilaterally. No wheezing/rhonchi. Normal effort. GI: Soft, nontender, nondistended. Normal active bowel sounds. Neuro: A/O x 3. No focal motor or sensory deficits. Speech normal.   Psych: Appropriate affect, judgement and insight. Short-term memory intact.      Monofilament nml bilat  SKIN:blanchable erythema of low back with scattered papules on torso  Labwork and Ancillary Studies:    CBC w/Diff  Lab Results   Component Value Date/Time    WBC 6.6 05/19/2017 10:38 AM    HGB 14.1 05/19/2017 10:38 AM    PLATELET 315 46/30/9988 10:38 AM         Basic Metabolic Profile/LFTs  Lab Results   Component Value Date/Time    Sodium 141 05/19/2017 10:38 AM    Potassium 5.1 05/26/2017 10:30 AM    Chloride 107 05/19/2017 10:38 AM    CO2 23 05/19/2017 10:38 AM    Anion gap 11 05/19/2017 10:38 AM    Glucose 71 05/19/2017 10:38 AM    BUN 48 05/19/2017 10:38 AM    Creatinine 1.91 05/19/2017 10:38 AM    BUN/Creatinine ratio 25 05/19/2017 10:38 AM    GFR est AA 42 05/19/2017 10:38 AM    GFR est non-AA 35 05/19/2017 10:38 AM    Calcium 10.1 05/19/2017 10:38 AM      Lab Results   Component Value Date/Time    ALT (SGPT) 23 05/19/2017 10:38 AM    AST (SGOT) 17 05/19/2017 10:38 AM    Alk.  phosphatase 51 05/19/2017 10:38 AM    Bilirubin, total 0.6 05/19/2017 10:38 AM       Cholesterol  Lab Results   Component Value Date/Time    Cholesterol, total 167 05/19/2017 10:38 AM    HDL Cholesterol 53 05/19/2017 10:38 AM    LDL, calculated 99.6 05/19/2017 10:38 AM    Triglyceride 72 05/19/2017 10:38 AM    CHOL/HDL Ratio 3.2 05/19/2017 10:38 AM

## 2017-12-21 NOTE — PATIENT INSTRUCTIONS
You have been referred to ophthalmology. Please call one of the preferred providers listed below and schedule your appointment. Once you have scheduled your appointment, please call the office at [phone] and leave the details of your appointment (provider you will be seeing, appointment date and time) on the voice mail. [facility]  Dr. Kumar  [phone].   [address]

## 2017-12-21 NOTE — PROGRESS NOTES
Reyes Rainey is a 69 y.o. male (: 1940) presenting to address:    Chief Complaint   Patient presents with    Diabetes       Vitals:    17 0740   BP: 118/60   Pulse: (!) 53   Resp: 18   Temp: 98 °F (36.7 °C)   TempSrc: Oral   SpO2: 99%   Weight: 221 lb 12.8 oz (100.6 kg)   Height: 5' 7\" (1.702 m)   PainSc:   0 - No pain       Hearing/Vision:   No exam data present    Learning Assessment:     Learning Assessment 2017   PRIMARY LEARNER Patient   HIGHEST LEVEL OF EDUCATION - PRIMARY LEARNER  4 YEARS OF COLLEGE   BARRIERS PRIMARY LEARNER NONE   CO-LEARNER CAREGIVER No   PRIMARY LANGUAGE ENGLISH    NEED No   LEARNER PREFERENCE PRIMARY VIDEOS   ANSWERED BY self   RELATIONSHIP SELF     Depression Screening:     PHQ over the last two weeks 2017   Little interest or pleasure in doing things Not at all   Feeling down, depressed or hopeless Not at all   Total Score PHQ 2 0     Fall Risk Assessment:     Fall Risk Assessment, last 12 mths 2017   Able to walk? Yes   Fall in past 12 months? No     Abuse Screening:     Abuse Screening Questionnaire 2017   Do you ever feel afraid of your partner? N   Are you in a relationship with someone who physically or mentally threatens you? N   Is it safe for you to go home? Y     Coordination of Care Questionaire:   1. Have you been to the ER, urgent care clinic since your last visit? Hospitalized since your last visit? Yes, urgent care cough     2. Have you seen or consulted any other health care providers outside of the [address] [place] [address] since your last visit? Include any pap smears or colon screening. Yes, chiropractic, gastro, nephrology , urology upcoming endo in 2018 and upcoming eye exam     Advanced Directive:   1. Do you have an Advanced Directive? No     2. Would you like information on Advanced Directives?  No   Health Maintenance Due   Topic Date Due    EYE EXAM RETINAL OR DILATED Q1  1950    GLAUCOMA SCREENING Q2Y  06/02/2006    MEDICARE YEARLY EXAM  06/02/2006    Influenza Age 9 to Adult  08/01/2017       Immunization/s administered 12/21/2017 by Arthur Everett LPN with guardian's consent. Patient tolerated procedure well. No reactions noted. High dose influenza, right deltoid.

## 2017-12-22 LAB
% FREE PSA, 480797: 47.3 %
PSA SERPL-MCNC: 4.5 NG/ML (ref 0–4)
PSA, FREE, 480853: 2.13 NG/ML
REFLEX CRITERIA: ABNORMAL

## 2018-01-06 DIAGNOSIS — E11.9 CONTROLLED TYPE 2 DIABETES MELLITUS WITHOUT COMPLICATION, WITHOUT LONG-TERM CURRENT USE OF INSULIN (HCC): ICD-10-CM

## 2018-01-08 DIAGNOSIS — E11.9 CONTROLLED TYPE 2 DIABETES MELLITUS WITHOUT COMPLICATION, WITHOUT LONG-TERM CURRENT USE OF INSULIN (HCC): ICD-10-CM

## 2018-01-08 RX ORDER — SODIUM BICARBONATE 325 MG/1
325 TABLET ORAL DAILY
Qty: 90 TAB | Refills: 0 | Status: CANCELLED | OUTPATIENT
Start: 2018-01-08

## 2018-01-08 RX ORDER — GLIMEPIRIDE 4 MG/1
4 TABLET ORAL DAILY
Qty: 7 TAB | Refills: 0 | Status: SHIPPED | OUTPATIENT
Start: 2018-01-08 | End: 2018-01-17 | Stop reason: SDUPTHER

## 2018-01-08 NOTE — TELEPHONE ENCOUNTER
Patient would like to know if he could have 3 orders of Flonase called in at a time with refills. Patient has been using this medication throughout the day and is running out faster than normal. Patient will be leaving out [place] in two weeks and wants to make sure that he has enough medication to last him his trip. Patient would like to have a a weeks worth of glimepiride called in for the patient. Patient is completely out of the medication and will be waiting for the medication to arrive to the house. Patient would like to have the week supply of this medication called in to the [facility] DR INDIRA MIRANDA that is in his chart.

## 2018-01-09 RX ORDER — SODIUM BICARBONATE 650 MG/1
TABLET ORAL
Qty: 45 TAB | Refills: 0 | OUTPATIENT
Start: 2018-01-09

## 2018-01-09 RX ORDER — NEBIVOLOL HYDROCHLORIDE 10 MG/1
TABLET ORAL
Qty: 90 TAB | Refills: 0 | Status: SHIPPED | OUTPATIENT
Start: 2018-01-09 | End: 2018-03-15 | Stop reason: SDUPTHER

## 2018-01-09 RX ORDER — PIOGLITAZONEHYDROCHLORIDE 30 MG/1
TABLET ORAL
Qty: 90 TAB | Refills: 0 | Status: SHIPPED | OUTPATIENT
Start: 2018-01-09 | End: 2018-03-05 | Stop reason: SDUPTHER

## 2018-01-09 RX ORDER — FUROSEMIDE 40 MG/1
40 TABLET ORAL DAILY
Qty: 30 TAB | Refills: 1 | Status: SHIPPED | OUTPATIENT
Start: 2018-01-09 | End: 2018-03-05 | Stop reason: SDUPTHER

## 2018-01-17 DIAGNOSIS — E11.9 CONTROLLED TYPE 2 DIABETES MELLITUS WITHOUT COMPLICATION, WITHOUT LONG-TERM CURRENT USE OF INSULIN (HCC): ICD-10-CM

## 2018-01-17 RX ORDER — GLIMEPIRIDE 4 MG/1
4 TABLET ORAL DAILY
Qty: 7 TAB | Refills: 0 | Status: SHIPPED | OUTPATIENT
Start: 2018-01-17 | End: 2018-01-19 | Stop reason: SDUPTHER

## 2018-01-19 DIAGNOSIS — E11.9 CONTROLLED TYPE 2 DIABETES MELLITUS WITHOUT COMPLICATION, WITHOUT LONG-TERM CURRENT USE OF INSULIN (HCC): ICD-10-CM

## 2018-01-19 RX ORDER — GLIMEPIRIDE 4 MG/1
4 TABLET ORAL DAILY
Qty: 90 TAB | Refills: 1 | Status: SHIPPED | OUTPATIENT
Start: 2018-01-19 | End: 2018-04-30 | Stop reason: SDUPTHER

## 2018-01-19 NOTE — TELEPHONE ENCOUNTER
Pt's spouse called today asking why mail order glimepiride didn't have any refills on it. Pt not due to be seen for 6 months.

## 2018-03-04 DIAGNOSIS — E11.9 CONTROLLED TYPE 2 DIABETES MELLITUS WITHOUT COMPLICATION, WITHOUT LONG-TERM CURRENT USE OF INSULIN (HCC): ICD-10-CM

## 2018-03-05 DIAGNOSIS — E11.9 CONTROLLED TYPE 2 DIABETES MELLITUS WITHOUT COMPLICATION, WITHOUT LONG-TERM CURRENT USE OF INSULIN (HCC): ICD-10-CM

## 2018-03-05 RX ORDER — HYDRALAZINE HYDROCHLORIDE 50 MG/1
TABLET, FILM COATED ORAL
Qty: 180 TAB | Refills: 0 | Status: SHIPPED | OUTPATIENT
Start: 2018-03-05 | End: 2018-06-09 | Stop reason: SDUPTHER

## 2018-03-05 RX ORDER — SITAGLIPTIN 50 MG/1
TABLET, FILM COATED ORAL
Qty: 90 TAB | Refills: 0 | Status: SHIPPED | OUTPATIENT
Start: 2018-03-05 | End: 2018-06-09 | Stop reason: SDUPTHER

## 2018-03-06 RX ORDER — FUROSEMIDE 40 MG/1
TABLET ORAL
Qty: 39 TAB | Refills: 0 | Status: SHIPPED | OUTPATIENT
Start: 2018-03-06 | End: 2018-06-09 | Stop reason: SDUPTHER

## 2018-03-06 RX ORDER — PIOGLITAZONEHYDROCHLORIDE 30 MG/1
TABLET ORAL
Qty: 90 TAB | Refills: 0 | Status: SHIPPED | OUTPATIENT
Start: 2018-03-06 | End: 2018-06-09 | Stop reason: SDUPTHER

## 2018-04-11 ENCOUNTER — TELEPHONE (OUTPATIENT)
Dept: FAMILY MEDICINE CLINIC | Age: 78
End: 2018-04-11

## 2018-04-11 DIAGNOSIS — R09.89 BRUIT OF RIGHT CAROTID ARTERY: Primary | ICD-10-CM

## 2018-04-12 DIAGNOSIS — L30.9 DERMATITIS: ICD-10-CM

## 2018-04-12 RX ORDER — TRIAMCINOLONE ACETONIDE 1 MG/G
CREAM TOPICAL 2 TIMES DAILY
Qty: 453 G | Refills: 0 | Status: SHIPPED | OUTPATIENT
Start: 2018-04-12 | End: 2018-07-09

## 2018-04-12 NOTE — TELEPHONE ENCOUNTER
Tell pt the nurse from insurance noted a bruit in his neck. This can be from narrowing in the carotids. i've ordered a pvl to look at the area.  He can call [phone] to schedule

## 2018-04-12 NOTE — TELEPHONE ENCOUNTER
Pt will call for pvl. He asks for triamcinolone  Separate refill encounter. He notes that his BP was 175/80 when NP was there as well. He said the kidney dr took him off lisinopril totally. I asked him to report this to kidney doctor as well.

## 2018-04-30 DIAGNOSIS — E11.9 CONTROLLED TYPE 2 DIABETES MELLITUS WITHOUT COMPLICATION, WITHOUT LONG-TERM CURRENT USE OF INSULIN (HCC): ICD-10-CM

## 2018-04-30 RX ORDER — GLIMEPIRIDE 4 MG/1
4 TABLET ORAL DAILY
Qty: 90 TAB | Refills: 0 | Status: SHIPPED | OUTPATIENT
Start: 2018-04-30 | End: 2018-06-09 | Stop reason: SDUPTHER

## 2018-05-08 RX ORDER — FLUTICASONE PROPIONATE 50 MCG
SPRAY, SUSPENSION (ML) NASAL
Qty: 16 G | Refills: 3 | Status: SHIPPED | OUTPATIENT
Start: 2018-05-08 | End: 2019-09-04 | Stop reason: SDUPTHER

## 2018-05-10 DIAGNOSIS — R09.89 BRUIT OF RIGHT CAROTID ARTERY: ICD-10-CM

## 2018-05-10 NOTE — PATIENT DISCUSSION
POSTERIOR VITREOUS DETACHMENT, OD: NO HOLES. NO TEARS. RETINAL  DETACHMENT WARNINGS DISCUSSED. RETURN FOR FOLLOW-UP AS SCHEDULED.

## 2018-05-21 ENCOUNTER — OFFICE VISIT (OUTPATIENT)
Dept: FAMILY MEDICINE CLINIC | Age: 78
End: 2018-05-21

## 2018-05-21 ENCOUNTER — HOSPITAL ENCOUNTER (OUTPATIENT)
Dept: LAB | Age: 78
Discharge: HOME | End: 2018-05-21
Payer: MEDICARE

## 2018-05-21 VITALS
TEMPERATURE: 97.9 F | BODY MASS INDEX: 35.79 KG/M2 | OXYGEN SATURATION: 93 % | HEIGHT: 67 IN | RESPIRATION RATE: 16 BRPM | SYSTOLIC BLOOD PRESSURE: 128 MMHG | DIASTOLIC BLOOD PRESSURE: 68 MMHG | WEIGHT: 228 LBS | HEART RATE: 59 BPM

## 2018-05-21 DIAGNOSIS — N18.30 CKD (CHRONIC KIDNEY DISEASE) STAGE 3, GFR 30-59 ML/MIN (HCC): ICD-10-CM

## 2018-05-21 DIAGNOSIS — Z12.83 SKIN CANCER SCREENING: ICD-10-CM

## 2018-05-21 DIAGNOSIS — I10 ESSENTIAL HYPERTENSION: ICD-10-CM

## 2018-05-21 DIAGNOSIS — E53.8 B12 DEFICIENCY: ICD-10-CM

## 2018-05-21 DIAGNOSIS — R60.0 LOCALIZED EDEMA: ICD-10-CM

## 2018-05-21 DIAGNOSIS — D12.4 ADENOMATOUS POLYP OF DESCENDING COLON: ICD-10-CM

## 2018-05-21 DIAGNOSIS — R97.20 ELEVATED PSA: ICD-10-CM

## 2018-05-21 DIAGNOSIS — I10 ESSENTIAL HYPERTENSION: Primary | ICD-10-CM

## 2018-05-21 DIAGNOSIS — E11.9 CONTROLLED TYPE 2 DIABETES MELLITUS WITHOUT COMPLICATION, WITHOUT LONG-TERM CURRENT USE OF INSULIN (HCC): ICD-10-CM

## 2018-05-21 DIAGNOSIS — Z00.00 MEDICARE ANNUAL WELLNESS VISIT, SUBSEQUENT: ICD-10-CM

## 2018-05-21 LAB
ALBUMIN SERPL-MCNC: 4.1 G/DL (ref 3.4–5)
ALBUMIN/GLOB SERPL: 1.4 {RATIO} (ref 0.8–1.7)
ALP SERPL-CCNC: 68 U/L (ref 45–117)
ALT SERPL-CCNC: 18 U/L (ref 16–61)
ANION GAP SERPL CALC-SCNC: 9 MMOL/L (ref 3–18)
AST SERPL-CCNC: 14 U/L (ref 15–37)
BILIRUB SERPL-MCNC: 0.2 MG/DL (ref 0.2–1)
BUN SERPL-MCNC: 33 MG/DL (ref 7–18)
BUN/CREAT SERPL: 19 (ref 12–20)
CALCIUM SERPL-MCNC: 9.3 MG/DL (ref 8.5–10.1)
CHLORIDE SERPL-SCNC: 109 MMOL/L (ref 100–108)
CHOLEST SERPL-MCNC: 124 MG/DL
CO2 SERPL-SCNC: 24 MMOL/L (ref 21–32)
CREAT SERPL-MCNC: 1.76 MG/DL (ref 0.6–1.3)
ERYTHROCYTE [DISTWIDTH] IN BLOOD BY AUTOMATED COUNT: 13.4 % (ref 11.6–14.5)
GLOBULIN SER CALC-MCNC: 3 G/DL (ref 2–4)
GLUCOSE SERPL-MCNC: 126 MG/DL (ref 74–99)
HBA1C MFR BLD: 7.3 % (ref 4.2–5.6)
HCT VFR BLD AUTO: 39.5 % (ref 36–48)
HDLC SERPL-MCNC: 46 MG/DL (ref 40–60)
HDLC SERPL: 2.7 {RATIO} (ref 0–5)
HGB BLD-MCNC: 12.2 G/DL (ref 13–16)
LDLC SERPL CALC-MCNC: 64.4 MG/DL (ref 0–100)
LIPID PROFILE,FLP: NORMAL
MCH RBC QN AUTO: 30.3 PG (ref 24–34)
MCHC RBC AUTO-ENTMCNC: 30.9 G/DL (ref 31–37)
MCV RBC AUTO: 98 FL (ref 74–97)
PLATELET # BLD AUTO: 212 K/UL (ref 135–420)
PMV BLD AUTO: 10.2 FL (ref 9.2–11.8)
POTASSIUM SERPL-SCNC: 5.5 MMOL/L (ref 3.5–5.5)
PROT SERPL-MCNC: 7.1 G/DL (ref 6.4–8.2)
RBC # BLD AUTO: 4.03 M/UL (ref 4.7–5.5)
SODIUM SERPL-SCNC: 142 MMOL/L (ref 136–145)
TRIGL SERPL-MCNC: 68 MG/DL (ref ?–150)
VIT B12 SERPL-MCNC: >2000 PG/ML (ref 211–911)
VLDLC SERPL CALC-MCNC: 13.6 MG/DL
WBC # BLD AUTO: 8.3 K/UL (ref 4.6–13.2)

## 2018-05-21 PROCEDURE — 36415 COLL VENOUS BLD VENIPUNCTURE: CPT | Performed by: INTERNAL MEDICINE

## 2018-05-21 PROCEDURE — 84154 ASSAY OF PSA FREE: CPT | Performed by: INTERNAL MEDICINE

## 2018-05-21 PROCEDURE — 85027 COMPLETE CBC AUTOMATED: CPT | Performed by: INTERNAL MEDICINE

## 2018-05-21 PROCEDURE — 80053 COMPREHEN METABOLIC PANEL: CPT | Performed by: INTERNAL MEDICINE

## 2018-05-21 PROCEDURE — 83036 HEMOGLOBIN GLYCOSYLATED A1C: CPT | Performed by: INTERNAL MEDICINE

## 2018-05-21 PROCEDURE — 82607 VITAMIN B-12: CPT | Performed by: INTERNAL MEDICINE

## 2018-05-21 PROCEDURE — 84153 ASSAY OF PSA TOTAL: CPT | Performed by: INTERNAL MEDICINE

## 2018-05-21 PROCEDURE — 82043 UR ALBUMIN QUANTITATIVE: CPT | Performed by: INTERNAL MEDICINE

## 2018-05-21 PROCEDURE — 80061 LIPID PANEL: CPT | Performed by: INTERNAL MEDICINE

## 2018-05-21 RX ORDER — LANOLIN ALCOHOL/MO/W.PET/CERES
5000 CREAM (GRAM) TOPICAL
COMMUNITY

## 2018-05-21 RX ORDER — VITAMIN E 268 MG
CAPSULE ORAL DAILY
COMMUNITY

## 2018-05-21 RX ORDER — GLUCOSAMINE SULFATE 1500 MG
POWDER IN PACKET (EA) ORAL DAILY
COMMUNITY

## 2018-05-21 NOTE — PATIENT INSTRUCTIONS
Medicare Wellness Visit, Male    The best way to live healthy is to have a lifestyle where you eat a well-balanced diet, exercise regularly, limit alcohol use, and quit all forms of tobacco/nicotine, if applicable. Regular preventive services are another way to keep healthy. Preventive services (vaccines, screening tests, monitoring & exams) can help personalize your care plan, which helps you manage your own care. Screening tests can find health problems at the earliest stages, when they are easiest to treat. [facility] follows the current, evidence-based guidelines published by the [facility] ([facility]) when recommending preventive services for our patients. Because we follow these guidelines, sometimes recommendations change over time as research supports it. (For example, a prostate screening blood test is no longer routinely recommended for men with no symptoms.)    Of course, you and your provider may decide to screen more often for some diseases, based on your risk and co-morbidities (chronic disease you are already diagnosed with). Preventive services for you include:    - Medicare offers their members a free annual wellness visit, which is time for you and your primary care provider to discuss and plan for your preventive service needs. Take advantage of this benefit every year!    -All people over age 72 should receive the recommended pneumonia vaccines. Current USPSTF guidelines recommend a series of two vaccines for the best pneumonia protection.     -All adults should have a yearly flu vaccine and a tetanus vaccine every 10 years.  All adults age 61 years should receive a shingles vaccine once in their lifetime.      -All adults age 38-68 years who are overweight should have a diabetes screening test once every three years.     -Other screening tests & preventive services for persons with diabetes include: an eye exam to screen for diabetic retinopathy, a kidney function test, a foot exam, and stricter control over your cholesterol.     -Cardiovascular screening for adults with routine risk involves an electrocardiogram (ECG) at intervals determined by the provider.     -Colorectal cancer screenings should be done for adults age 54-65 years with normal risk. There are a number of acceptable methods of screening for this type of cancer. Each test has its own benefits and drawbacks. Discuss with your provider what is most appropriate for you during your annual wellness visit. The different tests include: colonoscopy (considered the best screening method), a fecal occult blood test, a fecal DNA test, and sigmoidoscopy.    -All adults born between [facility] should be screened once for Hepatitis C.    -An Abdominal Aortic Aneurysm (AAA) Screening is recommended for men age 73-68 who has ever smoked in their lifetime.      Here is a list of your current Health Maintenance items (your personalized list of preventive services) with a due date:  Health Maintenance Due   Topic Date Due    Albumin Urine Test  05/19/2018    Cholesterol Test   05/19/2018    Hemoglobin A1C    06/21/2018

## 2018-05-21 NOTE — MR AVS SNAPSHOT
[address] [place] [address] [address] [facility] [id number] [phone] Patient: Reema Colunga MRN: [id number] VVL:[id number] Visit Information Date & Time Provider Department Dept. Phone Encounter #  
 5/21/2018  8:00 AM Brooklynn Bowles MD [address] [place] [address] [phone] [id number]  
  
 7/9/2018  2:15 PM  
Any with Katie Felix MD  
[facility]) Appt Note: 6 month f/u; missed 6 mt f/u so scheduled 1 yr f/u prior PSA  
 [address] [address]  
[phone]  
  
   
 [facility] [phone] [id number] Upcoming Health Maintenance Date Due MICROALBUMIN Q1 5/19/2018 LIPID PANEL Q1 5/19/2018 HEMOGLOBIN A1C Q6M 6/21/2018 Influenza Age 5 to Adult 8/1/2018 FOOT EXAM Q1 12/21/2018 EYE EXAM RETINAL OR DILATED Q1 1/18/2019 GLAUCOMA SCREENING Q2Y 1/18/2020 DTaP/Tdap/Td series (2 - Td) 5/19/2027 Allergies as of 5/21/2018  Review Complete On: 5/21/2018 By: Gael Glasgow Severity Noted Reaction Type Reactions Norvasc [Amlodipine]  05/19/2017    Swelling Current Immunizations  Reviewed on 12/21/2017 Name Date Influenza High Dose Vaccine PF 12/21/2017  8:45 AM  
 Pneumococcal Conjugate (PCV-13) 11/7/2016 Pneumococcal Polysaccharide (PPSV-23) 5/1/2010 Zoster Vaccine, Live 10/1/2012 Not reviewed this visit You Were Diagnosed With   
  
 Codes Comments Essential hypertension    -  Primary ICD-10-CM: I10 
ICD-9-CM: 401.9 CKD (chronic kidney disease) stage 3, GFR 30-59 ml/min     ICD-10-CM: N18.3 ICD-9-CM: 585.3 B12 deficiency     ICD-10-CM: E53.8 ICD-9-CM: 266.2 Controlled type 2 diabetes mellitus without complication, without long-term current use of insulin ([facility]     ICD-10-CM: E11.9 ICD-9-CM: 250.00 Medicare annual wellness visit, subsequent     ICD-10-CM: Z00.00 ICD-9-CM: V70.0 Elevated PSA     ICD-10-CM: R97.20 ICD-9-CM: 790.93 Adenomatous polyp of descending colon     ICD-10-CM: D12.4 ICD-9-CM: 211.3 Vitals BP Pulse Temp Resp Height(growth percentile) Weight(growth percentile) 128/68 (BP 1 Location: Left arm, BP Patient Position: Sitting) (!) 59 97.9 °F (36.6 °C) (Oral) 16 5' 7\" (1.702 m) 228 lb (103.4 kg) SpO2 BMI Smoking Status 93% 35.71 kg/m2 Never Smoker Vitals History BMI and BSA Data Body Mass Index Body Surface Area 35.71 kg/m 2 2.21 m 2 Preferred Pharmacy Pharmacy Name Phone [address] [facility], [address] [place] [address] [address] Your Updated Medication List  
  
   
This list is accurate as of 5/21/18  8:12 AM.  Always use your most recent med list.  
  
  
  
  
 aspirin 81 mg chewable tablet Take 81 mg by mouth daily. atorvastatin 10 mg tablet Commonly known as:  LIPITOR  
TAKE 1 TABLET BY MOUTH  DAILY  
  
 azelastine 0.05 % ophthalmic solution Commonly known as:  OPTIVAR Administer 1 Drop to both eyes two (2) times a day. Use in affected eye(s) BYSTOLIC 10 mg tablet Generic drug:  nebivolol TAKE 1 TABLET BY MOUTH  DAILY  
  
 cranberry extract 450 mg Tab tablet Take  by mouth.  
  
 cyanocobalamin 1,000 mcg tablet Take 1,000 mcg by mouth daily. fluticasone 50 mcg/actuation nasal spray Commonly known as:  Doug Guy INHALE 2 SPRAYS IN EACH  NOSTRIL DAILY  
  
 furosemide 40 mg tablet Commonly known as:  LASIX TAKE 1 TABLET BY MOUTH  MONDAY, WEDNESDAY, AND  FRIDAY  
  
 glimepiride 4 mg tablet Commonly known as:  AMARYL Take 1 Tab by mouth daily. glucose blood VI test strips strip Commonly known as:  ONETOUCH ULTRA TEST Test sugars daily. E11.9  
  
 hydrALAZINE 50 mg tablet Commonly known as:  APRESOLINE  
TAKE 1 TABLET BY MOUTH TWO  TIMES DAILY JANUVIA 50 mg tablet Generic drug:  SITagliptin TAKE 1 TABLET BY MOUTH  DAILY  
  
 lisinopril 10 mg tablet Commonly known as:  PRINIVIL, ZESTRIL  
TAKE 1 TABLET BY MOUTH  DAILY  
  
 OTHER Indications: Osteo Bi-Flex 1 per day  
  
 pioglitazone 30 mg tablet Commonly known as:  ACTOS  
TAKE 1 TABLET BY MOUTH  DAILY  
  
 sodium bicarbonate 325 mg tablet Take 1 Tab by mouth daily. tamsulosin 0.4 mg capsule Commonly known as:  FLOMAX Take 1 Cap by mouth daily (after dinner). triamcinolone acetonide 0.1 % topical cream  
Commonly known as:  KENALOG Apply  to affected area two (2) times a day. use thin layer VENTOLIN HFA 90 mcg/actuation inhaler Generic drug:  albuterol Take  by inhalation. VITAMIN C 500 mg tablet Generic drug:  ascorbic acid (vitamin C) Take 1,000 mg by mouth. VITAMIN D3 1,000 unit Cap Generic drug:  cholecalciferol Take  by mouth daily. vitamin E 400 unit capsule Commonly known as:  [address] Take  by mouth daily. To-Do List   
 05/21/2018 Lab:  CBC W/O DIFF   
  
 05/21/2018 Lab:  HEMOGLOBIN A1C W/O EAG   
  
 05/21/2018 Lab:  LIPID PANEL   
  
 05/21/2018 Lab:  METABOLIC PANEL, COMPREHENSIVE   
  
 05/21/2018 Lab:  MICROALBUMIN, UR, RAND W/ MICROALB/CREAT RATIO   
  
 05/21/2018 Lab:  PSA W/ REFLX FREE PSA   
  
 05/21/2018 Lab:  VITAMIN B12 Patient Instructions Medicare Wellness Visit, Male The best way to live healthy is to have a lifestyle where you eat a well-balanced diet, exercise regularly, limit alcohol use, and quit all forms of tobacco/nicotine, if applicable. Regular preventive services are another way to keep healthy. Preventive services (vaccines, screening tests, monitoring & exams) can help personalize your care plan, which helps you manage your own care. Screening tests can find health problems at the earliest stages, when they are easiest to treat. [facility] follows the current, evidence-based guidelines published by the [facility] ([facility]) when recommending preventive services for our patients. Because we follow these guidelines, sometimes recommendations change over time as research supports it. (For example, a prostate screening blood test is no longer routinely recommended for men with no symptoms.) Of course, you and your provider may decide to screen more often for some diseases, based on your risk and co-morbidities (chronic disease you are already diagnosed with). Preventive services for you include: - Medicare offers their members a free annual wellness visit, which is time for you and your primary care provider to discuss and plan for your preventive service needs. Take advantage of this benefit every year! 
 
-All people over age 72 should receive the recommended pneumonia vaccines. Current USPSTF guidelines recommend a series of two vaccines for the best pneumonia protection.  
 
-All adults should have a yearly flu vaccine and a tetanus vaccine every 10 years. All adults age 61 years should receive a shingles vaccine once in their lifetime.   
 
-All adults age 38-68 years who are overweight should have a diabetes screening test once every three years.  
 
-Other screening tests & preventive services for persons with diabetes include: an eye exam to screen for diabetic retinopathy, a kidney function test, a foot exam, and stricter control over your cholesterol.  
 
-Cardiovascular screening for adults with routine risk involves an electrocardiogram (ECG) at intervals determined by the provider.  
 
-Colorectal cancer screenings should be done for adults age 54-65 years with normal risk. There are a number of acceptable methods of screening for this type of cancer. Each test has its own benefits and drawbacks.  Discuss with your provider what is most appropriate for you during your annual wellness visit. The different tests include: colonoscopy (considered the best screening method), a fecal occult blood test, a fecal DNA test, and sigmoidoscopy. 
 
-All adults born between [facility] should be screened once for Hepatitis C. 
 
-An Abdominal Aortic Aneurysm (AAA) Screening is recommended for men age 73-68 who has ever smoked in their lifetime. Here is a list of your current Health Maintenance items (your personalized list of preventive services) with a due date: 
Health Maintenance Due Topic Date Due  
 Albumin Urine Test  05/19/2018  Cholesterol Test   05/19/2018  Hemoglobin A1C    06/21/2018 Introducing [facility]! Dear Fortino Becker: Thank you for requesting a USDS account. Our records indicate that you already have an active USDS account. You can access your account anytime at [email or web address]. SEAT [email or web address]/[email or web address] Did you know that you can access your hospital and ER discharge instructions at any time in USDS? You can also review all of your test results from your hospital stay or ER visit. Additional Information If you have questions, please visit the Frequently Asked Questions section of the USDS website at [email or web address] [email or web address]/. Remember, USDS is NOT to be used for urgent needs. For medical emergencies, dial 911. Now available from your iPhone and Android! Please provide this summary of care documentation to your next provider. Your primary care clinician is listed as Guy Lin. If you have any questions after today's visit, please call [phone].

## 2018-05-21 NOTE — ACP (ADVANCE CARE PLANNING)
Advance Care Planning (ACP) Provider Conversation Snapshot    Date of ACP Conversation: 05/21/18  Persons included in Conversation:  patient  Length of ACP Conversation in minutes:  <16 minutes (Non-Billable)    Authorized Decision Maker (if patient is incapable of making informed decisions):    This person is:   wife, Ama Albarado      For Patients with Decision Making Capacity:   full code    Conversation Outcomes / Follow-Up Plan:   Recommended completion of Advance Directive form after review of ACP materials and conversation with prospective healthcare agent

## 2018-05-21 NOTE — PROGRESS NOTES
Assessment/Plan:    1. Essential hypertension  -cont curent  - METABOLIC PANEL, COMPREHENSIVE; Future  - LIPID PANEL; Future  - CBC W/O DIFF; Future    2. CKD (chronic kidney disease) stage 3, GFR 30-59 ml/min  -fax results to renal  - METABOLIC PANEL, COMPREHENSIVE; Future  - LIPID PANEL; Future  - CBC W/O DIFF; Future    3. B12 deficiency  - VITAMIN B12; Future    4. Controlled type 2 diabetes mellitus without complication, without long-term current use of insulin (HCC)  - HEMOGLOBIN A1C W/O EAG; Future  - MICROALBUMIN, UR, RAND W/ MICROALB/CREAT RATIO; Future    5. Medicare annual wellness visit, subsequent    6. Elevated PSA  - PSA W/ REFLX FREE PSA; Future    7. Adenomatous polyp of descending colon  -due 2019    8. Edema- lasix daily x 1 week, then back to three times/week. If A1c is good, consider stopping actos. Wear compression stockings. The plan was discussed with the patient. The patient verbalized understanding and is in agreement with the plan. All medication potential side effects were discussed with the patient. Health Maintenance:   Health Maintenance   Topic Date Due    MICROALBUMIN Q1  05/19/2018    LIPID PANEL Q1  05/19/2018    HEMOGLOBIN A1C Q6M  06/21/2018    Influenza Age 9 to Adult  08/01/2018    FOOT EXAM Q1  12/21/2018    EYE EXAM RETINAL OR DILATED Q1  01/18/2019    GLAUCOMA SCREENING Q2Y  01/18/2020    DTaP/Tdap/Td series (2 - Td) 05/19/2027    ZOSTER VACCINE AGE 60>  Addressed    Pneumococcal 65+ Low/Medium Risk  Completed       Melissa Delaney is a 69 y.o. male and presents with Complete Physical and Cough     Subjective:  DM2 -  States a insurance nurse checked an A1c recently and it was in the 6's. HTN - bp controlled. Notes more swelling recently. On lasix three times/week. No dyspnea or orthopnea. Is on actos. ROS:  Constitutional: No recent weight change. No weakness/fatigue. No f/c.    Skin: No rashes, change in nails/hair, itching   HENT: No HA, dizziness. No hearing loss/tinnitus. No nasal congestion/discharge. Eyes: No change in vision, double/blurred vision or eye pain/redness. Cardiovascular: No CP/palpitations. No TORO/orthopnea/PND. Respiratory: No cough/sputum, dyspnea, wheezing. Gastointestinal: No dysphagia, reflux. No n/v. No constipation/diarrhea. No melena/rectal bleeding. Genitourinary: No dysuria, urinary hesitancy, nocturia, hematuria. No incontinence. Musculoskeletal: No joint pain/stiffness. No muscle pain/tenderness. Endo: No heat/cold intolerance, no polyuria/polydypsia. Heme: No h/o anemia. No easy bleeding/bruising. Allergy/Immunology: No seasonal rhinitis. Denies frequent colds, sinus/ear infections. Neurological: No seizures/numbness/weakness. No paresthesias. Psychiatric:  No depression, anxiety. The problem list was updated as a part of today's visit. Patient Active Problem List   Diagnosis Code    Controlled type 2 diabetes mellitus without complication, without long-term current use of insulin (HCC) E11.9    CKD (chronic kidney disease) stage 3, GFR 30-59 ml/min N18.3    Essential hypertension I10    Elevated PSA R97.20    Benign prostatic hyperplasia with lower urinary tract symptoms N40.1    B12 deficiency E53.8    Diabetic nephropathy associated with type 2 diabetes mellitus (HCC) E11.21    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 cholecystitis K80.21       The PSH, FH were reviewed. SH:  Social History   Substance Use Topics    Smoking status: Never Smoker    Smokeless tobacco: Never Used    Alcohol use No       Medications/Allergies:  Current Outpatient Prescriptions on File Prior to Visit   Medication Sig Dispense Refill    fluticasone (FLONASE) 50 mcg/actuation nasal spray INHALE 2 SPRAYS IN EACH  NOSTRIL DAILY 16 g 3    glimepiride (AMARYL) 4 mg tablet Take 1 Tab by mouth daily.  90 Tab 0    triamcinolone acetonide (KENALOG) 0.1 % topical cream Apply to affected area two (2) times a day. use thin layer 124 g 0    BYSTOLIC 10 mg tablet TAKE 1 TABLET BY MOUTH  DAILY 90 Tab 0    pioglitazone (ACTOS) 30 mg tablet TAKE 1 TABLET BY MOUTH  DAILY 90 Tab 0    furosemide (LASIX) 40 mg tablet TAKE 1 TABLET BY MOUTH  MONDAY, WEDNESDAY, AND  FRIDAY 39 Tab 0    hydrALAZINE (APRESOLINE) 50 mg tablet TAKE 1 TABLET BY MOUTH TWO  TIMES DAILY 180 Tab 0    JANUVIA 50 mg tablet TAKE 1 TABLET BY MOUTH  DAILY 90 Tab 0    atorvastatin (LIPITOR) 10 mg tablet TAKE 1 TABLET BY MOUTH  DAILY 90 Tab 3    glucose blood VI test strips (ONETOUCH ULTRA TEST) strip Test sugars daily. E11.9 100 Strip 3    azelastine (OPTIVAR) 0.05 % ophthalmic solution Administer 1 Drop to both eyes two (2) times a day. Use in affected eye(s) 6 mL 0    lisinopril (PRINIVIL, ZESTRIL) 10 mg tablet TAKE 1 TABLET BY MOUTH  DAILY      sodium bicarbonate 325 mg tablet Take 1 Tab by mouth daily. 90 Tab 0    tamsulosin (FLOMAX) 0.4 mg capsule Take 1 Cap by mouth daily (after dinner). 90 Cap 3    albuterol (VENTOLIN HFA) 90 mcg/actuation inhaler Take  by inhalation.  aspirin 81 mg chewable tablet Take 81 mg by mouth daily.  cranberry extract 450 mg tab Take  by mouth.  ascorbic acid, vitamin C, (VITAMIN C) 500 mg tablet Take 1,000 mg by mouth.  OTHER Indications: Osteo Bi-Flex 1 per day       No current facility-administered medications on file prior to visit. Allergies   Allergen Reactions    Norvasc [Amlodipine] Swelling       Objective:  Visit Vitals    /68 (BP 1 Location: Left arm, BP Patient Position: Sitting)    Pulse (!) 59    Temp 97.9 °F (36.6 °C) (Oral)    Resp 16    Ht 5' 7\" (1.702 m)    Wt 228 lb (103.4 kg)    SpO2 93%    BMI 35.71 kg/m2      Constitutional: Well developed, nourished, no distress, alert, obese habitus   HENT: Exterior ears and tympanic membranes normal bilaterally. Supple neck. No thyromegaly or lymphadenopathy.  Oropharynx clear and moist mucous membranes. Eyes: Conjunctiva normal. PERRL. CV: S1, S2.  RRR. No murmurs/rubs. No thrills palpated. No carotid bruits. Intact distal pulses. 2+ pitting pretibial edema. Pulm: No abnormalities on inspection. Clear to auscultation bilaterally. No wheezing/rhonchi. Normal effort. GI: Soft, nontender, nondistended. Normal active bowel sounds. MS: Gait normal.  Joints without deformity/tenderness. Strength intact bilateral upper and lower ext. Normal ROM all extremities. Neuro: A/O x 3. No focal motor or sensory deficits. Speech normal.   Skin: No lesions/rashes on inspection. Psych: Appropriate affect, judgement and insight. Short-term memory intact. This is the Subsequent Medicare Annual Wellness Exam, performed 12 months or more after the Initial AWV or the last Subsequent AWV    I have reviewed the patient's medical history in detail and updated the computerized patient record. History     Past Medical History:   Diagnosis Date    Calculus of kidney     Chronic kidney disease     Diabetes ([facility] [address]     Elevated PSA     Hypercholesterolemia     Hypertension       Past Surgical History:   Procedure Laterality Date    HX APPENDECTOMY      16years old     HX COLONOSCOPY  09/10/2014    HX ORTHOPAEDIC  1970s    r shoulder    HX UROLOGICAL  02/06/2017    Prostate Bx: HGPIN left lateral mid. Dr. Abner Blackwell @ [facility]. Current Outpatient Prescriptions   Medication Sig Dispense Refill    fluticasone (FLONASE) 50 mcg/actuation nasal spray INHALE 2 SPRAYS IN EACH  NOSTRIL DAILY 16 g 3    glimepiride (AMARYL) 4 mg tablet Take 1 Tab by mouth daily. 90 Tab 0    triamcinolone acetonide (KENALOG) 0.1 % topical cream Apply  to affected area two (2) times a day.  use thin layer 915 g 0    BYSTOLIC 10 mg tablet TAKE 1 TABLET BY MOUTH  DAILY 90 Tab 0    pioglitazone (ACTOS) 30 mg tablet TAKE 1 TABLET BY MOUTH  DAILY 90 Tab 0    furosemide (LASIX) 40 mg tablet TAKE 1 TABLET BY MOUTH  MONDAY, WEDNESDAY, AND  FRIDAY 39 Tab 0    hydrALAZINE (APRESOLINE) 50 mg tablet TAKE 1 TABLET BY MOUTH TWO  TIMES DAILY 180 Tab 0    JANUVIA 50 mg tablet TAKE 1 TABLET BY MOUTH  DAILY 90 Tab 0    atorvastatin (LIPITOR) 10 mg tablet TAKE 1 TABLET BY MOUTH  DAILY 90 Tab 3    glucose blood VI test strips (ONETOUCH ULTRA TEST) strip Test sugars daily. E11.9 100 Strip 3    azelastine (OPTIVAR) 0.05 % ophthalmic solution Administer 1 Drop to both eyes two (2) times a day. Use in affected eye(s) 6 mL 0    lisinopril (PRINIVIL, ZESTRIL) 10 mg tablet TAKE 1 TABLET BY MOUTH  DAILY      sodium bicarbonate 325 mg tablet Take 1 Tab by mouth daily. 90 Tab 0    tamsulosin (FLOMAX) 0.4 mg capsule Take 1 Cap by mouth daily (after dinner). 90 Cap 3    albuterol (VENTOLIN HFA) 90 mcg/actuation inhaler Take  by inhalation.  aspirin 81 mg chewable tablet Take 81 mg by mouth daily.  cranberry extract 450 mg tab Take  by mouth.  ascorbic acid, vitamin C, (VITAMIN C) 500 mg tablet Take 1,000 mg by mouth.       OTHER Indications: Osteo Bi-Flex 1 per day       Allergies   Allergen Reactions    Norvasc [Amlodipine] Swelling     Family History   Problem Relation Age of Onset    Kidney Disease Mother     Stroke Father      Social History   Substance Use Topics    Smoking status: Never Smoker    Smokeless tobacco: Never Used    Alcohol use No     Patient Active Problem List   Diagnosis Code    Controlled type 2 diabetes mellitus without complication, without long-term current use of insulin (HCC) E11.9    CKD (chronic kidney disease) stage 3, GFR 30-59 ml/min N18.3    Essential hypertension I10    Elevated PSA R97.20    Benign prostatic hyperplasia with lower urinary tract symptoms N40.1    B12 deficiency E53.8    Diabetic nephropathy associated with type 2 diabetes mellitus (HCC) E11.21    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 cholecystitis K80.21 Depression Risk Factor Screening:     PHQ over the last two weeks 5/21/2018   Little interest or pleasure in doing things Not at all   Feeling down, depressed or hopeless Not at all   Total Score PHQ 2 0     Alcohol Risk Factor Screening: You do not drink alcohol or very rarely. Functional Ability and Level of Safety:   Hearing Loss  Hearing is good. Activities of Daily Living  The home contains: no safety equipment. Patient does total self care    Fall Risk  Fall Risk Assessment, last 12 mths 5/21/2018   Able to walk? Yes   Fall in past 12 months? No       Abuse Screen  Patient is not abused    Cognitive Screening   Evaluation of Cognitive Function:  Has your family/caregiver stated any concerns about your memory: no  Normal    Patient Care Team   Patient Care Team:  John Boyd MD as PCP - General (Internal Medicine)  Nohemi Hodgkins, MD (Nephrology)  Kourtney Choi MD (Urology)  Sally Birch NP (Nurse Practitioner)  Pieter Morris MD (Ophthalmology)    Assessment/Plan   Education and counseling provided:  Are appropriate based on today's review and evaluation  End-of-Life planning (with patient's consent)    Diagnoses and all orders for this visit:    1. Essential hypertension  -     METABOLIC PANEL, COMPREHENSIVE; Future  -     LIPID PANEL; Future  -     CBC W/O DIFF; Future    2. CKD (chronic kidney disease) stage 3, GFR 30-59 ml/min  -     METABOLIC PANEL, COMPREHENSIVE; Future  -     LIPID PANEL; Future  -     CBC W/O DIFF; Future    3. B12 deficiency  -     VITAMIN B12; Future    4. Controlled type 2 diabetes mellitus without complication, without long-term current use of insulin (HCC)  -     HEMOGLOBIN A1C W/O EAG; Future  -     MICROALBUMIN, UR, RAND W/ MICROALB/CREAT RATIO; Future    5. Medicare annual wellness visit, subsequent    6. Elevated PSA  -     PSA W/ REFLX FREE PSA; Future    7.  Adenomatous polyp of descending colon        Health Maintenance Due   Topic Date Due    MICROALBUMIN Q1  05/19/2018    LIPID PANEL Q1  05/19/2018    HEMOGLOBIN A1C Q6M  06/21/2018

## 2018-05-21 NOTE — PROGRESS NOTES
Krish Lozoya is a 69 y.o. male (: 1940) presenting to address:    Chief Complaint   Patient presents with    Complete Physical    Cough       Vitals:    18 0750   BP: 128/68   Pulse: (!) 59   Resp: 16   Temp: 97.9 °F (36.6 °C)   TempSrc: Oral   SpO2: 93%   Weight: 228 lb (103.4 kg)   Height: 5' 7\" (1.702 m)   PainSc:   0 - No pain       Hearing/Vision:      Visual Acuity Screening    Right eye Left eye Both eyes   Without correction:      With correction: 20/25 20/25 20/20       Learning Assessment:     Learning Assessment 2017   PRIMARY LEARNER Patient   HIGHEST LEVEL OF EDUCATION - PRIMARY LEARNER  4 YEARS OF COLLEGE   BARRIERS PRIMARY LEARNER NONE   CO-LEARNER CAREGIVER No   PRIMARY LANGUAGE ENGLISH    NEED No   LEARNER PREFERENCE PRIMARY VIDEOS   ANSWERED BY self   RELATIONSHIP SELF     Depression Screening:     PHQ over the last two weeks 2018   Little interest or pleasure in doing things Not at all   Feeling down, depressed or hopeless Not at all   Total Score PHQ 2 0     Fall Risk Assessment:     Fall Risk Assessment, last 12 mths 2018   Able to walk? Yes   Fall in past 12 months? No     Abuse Screening:     Abuse Screening Questionnaire 2017   Do you ever feel afraid of your partner? N   Are you in a relationship with someone who physically or mentally threatens you? N   Is it safe for you to go home? Y     Coordination of Care Questionaire:   1. Have you been to the ER, urgent care clinic since your last visit? Hospitalized since your last visit? NO    2. Have you seen or consulted any other health care providers outside of the [facility] since your last visit? Include any pap smears or colon screening. YES Nephrology 2018, Ophthalmology 2018    Advanced Directive:   1. Do you have an Advanced Directive? YES    2. Would you like information on Advanced Directives?  NO

## 2018-05-22 DIAGNOSIS — D64.9 ANEMIA, UNSPECIFIED TYPE: Primary | ICD-10-CM

## 2018-05-22 LAB
% FREE PSA, 480797: 39.1 %
CREAT UR-MCNC: 61.21 MG/DL (ref 30–125)
MICROALBUMIN UR-MCNC: 17.2 MG/DL (ref 0–3)
MICROALBUMIN/CREAT UR-RTO: 281 MG/G (ref 0–30)
PSA SERPL-MCNC: 5.8 NG/ML (ref 0–4)
PSA, FREE, 480853: 2.27 NG/ML
REFLEX CRITERIA: ABNORMAL

## 2018-06-06 DIAGNOSIS — E11.9 CONTROLLED TYPE 2 DIABETES MELLITUS WITHOUT COMPLICATION, WITHOUT LONG-TERM CURRENT USE OF INSULIN (HCC): ICD-10-CM

## 2018-06-06 NOTE — TELEPHONE ENCOUNTER
[employer] called on behalf of the pt. He is requesting for test strips to be sent through [employer] because he is paying out of pocket right now for them. He would also like his albuterol rx to be transferred to them as well for future refills. Please advise.

## 2018-06-07 RX ORDER — ALBUTEROL SULFATE 90 UG/1
2 AEROSOL, METERED RESPIRATORY (INHALATION)
Qty: 3 INHALER | Refills: 1 | Status: SHIPPED | OUTPATIENT
Start: 2018-06-07 | End: 2018-06-08 | Stop reason: CLARIF

## 2018-06-08 RX ORDER — ALBUTEROL SULFATE 90 UG/1
2 AEROSOL, METERED RESPIRATORY (INHALATION)
Qty: 1 INHALER | Refills: 11 | Status: SHIPPED | OUTPATIENT
Start: 2018-06-08

## 2018-06-09 DIAGNOSIS — E11.9 CONTROLLED TYPE 2 DIABETES MELLITUS WITHOUT COMPLICATION, WITHOUT LONG-TERM CURRENT USE OF INSULIN (HCC): ICD-10-CM

## 2018-06-11 DIAGNOSIS — E11.9 CONTROLLED TYPE 2 DIABETES MELLITUS WITHOUT COMPLICATION, WITHOUT LONG-TERM CURRENT USE OF INSULIN (HCC): ICD-10-CM

## 2018-06-11 RX ORDER — GLIMEPIRIDE 2 MG/1
2 TABLET ORAL DAILY
Qty: 90 TAB | Refills: 0 | Status: SHIPPED | OUTPATIENT
Start: 2018-06-11 | End: 2018-08-28 | Stop reason: SDUPTHER

## 2018-06-11 RX ORDER — PIOGLITAZONEHYDROCHLORIDE 30 MG/1
30 TABLET ORAL DAILY
Qty: 90 TAB | Refills: 0 | Status: SHIPPED | OUTPATIENT
Start: 2018-06-11 | End: 2018-08-28 | Stop reason: SDUPTHER

## 2018-06-11 RX ORDER — GLIMEPIRIDE 4 MG/1
4 TABLET ORAL DAILY
Qty: 90 TAB | Refills: 0 | Status: SHIPPED | OUTPATIENT
Start: 2018-06-11 | End: 2018-06-11 | Stop reason: SDUPTHER

## 2018-06-11 RX ORDER — HYDRALAZINE HYDROCHLORIDE 50 MG/1
50 TABLET, FILM COATED ORAL 3 TIMES DAILY
Qty: 180 TAB | Refills: 0 | Status: SHIPPED | OUTPATIENT
Start: 2018-06-11 | End: 2018-06-11 | Stop reason: SDUPTHER

## 2018-06-11 RX ORDER — NEBIVOLOL 10 MG/1
10 TABLET ORAL DAILY
Qty: 90 TAB | Refills: 1 | Status: SHIPPED | OUTPATIENT
Start: 2018-06-11 | End: 2018-08-28 | Stop reason: SDUPTHER

## 2018-06-11 RX ORDER — FUROSEMIDE 40 MG/1
TABLET ORAL
Qty: 39 TAB | Refills: 0 | Status: SHIPPED | OUTPATIENT
Start: 2018-06-11 | End: 2018-08-28 | Stop reason: SDUPTHER

## 2018-06-11 RX ORDER — HYDRALAZINE HYDROCHLORIDE 25 MG/1
25 TABLET, FILM COATED ORAL 3 TIMES DAILY
Qty: 270 TAB | Refills: 1 | Status: SHIPPED | OUTPATIENT
Start: 2018-06-11 | End: 2018-06-21 | Stop reason: ALTCHOICE

## 2018-06-11 NOTE — TELEPHONE ENCOUNTER
From: Fransisco Castellanos  To: Zaynab Ortega MD  Sent: 6/9/2018 6:58 PM EDT  Subject: Medication Renewal Request    Original authorizing provider: MD Regino Parham. Collette Emmanuel would like a refill of the following medications:  hydrALAZINE (APRESOLINE) 50 mg tablet [Milady Plata MD]  JANUVIA 50 mg tablet [Milady Plata MD]  pioglitazone (ACTOS) 30 mg tablet [Milady Plata MD]  furosemide (LASIX) 40 mg tablet Zaynab Ortega MD]  glimepiride (AMARYL) 4 mg tablet Zaynab Ortega MD]    Preferred pharmacy: [address] [place] [address], [address] [place] [address] [address]    Comment:  Please request these to [facility] for 90 day refillable supply asap. Call and let me know when this is sent in. [phone].  Thank you    Medication renewals requested in this message routed to other providers:  BYSTOLIC 10 mg tablet Austin Yin MD]

## 2018-06-28 ENCOUNTER — OFFICE VISIT (OUTPATIENT)
Dept: FAMILY MEDICINE CLINIC | Age: 78
End: 2018-06-28

## 2018-06-28 VITALS
SYSTOLIC BLOOD PRESSURE: 110 MMHG | HEIGHT: 67 IN | DIASTOLIC BLOOD PRESSURE: 60 MMHG | TEMPERATURE: 98.2 F | WEIGHT: 208 LBS | HEART RATE: 69 BPM | BODY MASS INDEX: 32.65 KG/M2 | OXYGEN SATURATION: 93 %

## 2018-06-28 DIAGNOSIS — R53.83 FATIGUE, UNSPECIFIED TYPE: ICD-10-CM

## 2018-06-28 DIAGNOSIS — E11.9 CONTROLLED TYPE 2 DIABETES MELLITUS WITHOUT COMPLICATION, WITHOUT LONG-TERM CURRENT USE OF INSULIN (HCC): ICD-10-CM

## 2018-06-28 DIAGNOSIS — I10 ESSENTIAL HYPERTENSION: Primary | ICD-10-CM

## 2018-06-28 DIAGNOSIS — N18.30 CKD (CHRONIC KIDNEY DISEASE) STAGE 3, GFR 30-59 ML/MIN (HCC): ICD-10-CM

## 2018-06-28 RX ORDER — ALBUTEROL SULFATE 0.83 MG/ML
2.5 SOLUTION RESPIRATORY (INHALATION)
Qty: 24 EACH | Refills: 0 | Status: SHIPPED | OUTPATIENT
Start: 2018-06-28 | End: 2018-12-18 | Stop reason: SDUPTHER

## 2018-06-28 NOTE — PROGRESS NOTES
Shad Craig is a 69 y.o. male (: 1940) presenting to address:    Chief Complaint   Patient presents with    Hypertension    Weight Loss       Vitals:    18 1310   BP: 110/60   Pulse: 69   Temp: 98.2 °F (36.8 °C)   TempSrc: Oral   SpO2: 93%   Weight: 208 lb (94.3 kg)   Height: 5' 7\" (1.702 m)   PainSc:   0 - No pain       Learning Assessment:     Learning Assessment 2017   PRIMARY LEARNER Patient   HIGHEST LEVEL OF EDUCATION - PRIMARY LEARNER  4 YEARS OF COLLEGE   BARRIERS PRIMARY LEARNER NONE   CO-LEARNER CAREGIVER No   PRIMARY LANGUAGE ENGLISH    NEED No   LEARNER PREFERENCE PRIMARY VIDEOS   ANSWERED BY self   RELATIONSHIP SELF     Depression Screening:     PHQ over the last two weeks 2018   Little interest or pleasure in doing things Not at all   Feeling down, depressed or hopeless Not at all   Total Score PHQ 2 0     Fall Risk Assessment:     Fall Risk Assessment, last 12 mths 2018   Able to walk? Yes   Fall in past 12 months? No     Abuse Screening:     Abuse Screening Questionnaire 2017   Do you ever feel afraid of your partner? N   Are you in a relationship with someone who physically or mentally threatens you? N   Is it safe for you to go home? Y     Coordination of Care Questionaire:   1. Have you been to the ER, urgent care clinic since your last visit? Hospitalized since your last visit? NO    2. Have you seen or consulted any other health care providers outside of the [address] [place] [address] since your last visit? Include any pap smears or colon screening. NO    Advanced Directive:   1. Do you have an Advanced Directive? YES    2. Would you like information on Advanced Directives?  NO

## 2018-06-28 NOTE — ACP (ADVANCE CARE PLANNING)
Advance Care Planning (ACP) Provider Conversation Snapshot    Date of ACP Conversation: 06/28/18  Persons included in Conversation:  patient  Length of ACP Conversation in minutes:  <16 minutes (Non-Billable)    Authorized Decision Maker (if patient is incapable of making informed decisions):    This person is:   Healthcare Agent/Medical Power of  under Advance Directive          For Patients with Decision Making Capacity:   pt to decide    Conversation Outcomes / Follow-Up Plan:   Recommended completion of Advance Directive form after review of ACP materials and conversation with prospective healthcare agent

## 2018-06-28 NOTE — PROGRESS NOTES
Assessment/Plan:    1. Essential hypertension  -if PH<067/42, cut bystolic in half    2. Controlled type 2 diabetes mellitus without complication, without long-term current use of insulin ([facility] [address]  -if bs<100, stop actos. 3. CKD (chronic kidney disease) stage 3, GFR 30-59 ml/min  -stable    4. Fatigue, unspecified type  - ECHO STRESS; Future    The plan was discussed with the patient. The patient verbalized understanding and is in agreement with the plan. All medication potential side effects were discussed with the patient. Health Maintenance:   Health Maintenance   Topic Date Due    Influenza Age 5 to Adult  08/01/2018    HEMOGLOBIN A1C Q6M  11/21/2018    FOOT EXAM Q1  12/21/2018    EYE EXAM RETINAL OR DILATED Q1  01/18/2019    MICROALBUMIN Q1  05/21/2019    LIPID PANEL Q1  05/21/2019    GLAUCOMA SCREENING Q2Y  01/18/2020    DTaP/Tdap/Td series (2 - Td) 05/19/2027    ZOSTER VACCINE AGE 60>  Addressed    Pneumococcal 65+ Low/Medium Risk  Completed       Eugenie Suggs is a 69 y.o. male and presents with Hypertension     Subjective:  HTN- here for f/u HTN. bp has been decreasing due to wt loss. I had been emailing with his wife and advised him to stop hydralazine due to low bp. He's only on bystolic. Has lost 20lb in 1 mo. Is doing 1hr of cardio/day. He c/o fatigue. He gets 6-7hr/night. Wife reports snoring episodes. DM2 - notes lows as low as 50-70s. ROS:  Constitutional: No recent weight change. No weakness/+fatigue. No f/c. Cardiovascular: No CP/palpitations. No TORO/orthopnea/PND. Respiratory: No cough/sputum, dyspnea, wheezing. The problem list was updated as a part of today's visit.   Patient Active Problem List   Diagnosis Code    Controlled type 2 diabetes mellitus without complication, without long-term current use of insulin (HCC) E11.9    CKD (chronic kidney disease) stage 3, GFR 30-59 ml/min N18.3    Essential hypertension I10    Elevated PSA R97.20    Benign prostatic hyperplasia with lower urinary tract symptoms N40.1    B12 deficiency E53.8    Diabetic nephropathy associated with type 2 diabetes mellitus (HCC) E11.21    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 cholecystitis K80.21    Adenomatous polyp of descending colon D12.4    Localized edema R60.0       The PSH, FH were reviewed. SH:  Social History   Substance Use Topics    Smoking status: Never Smoker    Smokeless tobacco: Never Used    Alcohol use No       Medications/Allergies:  Current Outpatient Prescriptions on File Prior to Visit   Medication Sig Dispense Refill    nebivolol (BYSTOLIC) 10 mg tablet Take 1 Tab by mouth daily. 90 Tab 1    SITagliptin (JANUVIA) 50 mg tablet Take 1 Tab by mouth daily. 90 Tab 0    pioglitazone (ACTOS) 30 mg tablet Take 1 Tab by mouth daily. 90 Tab 0    furosemide (LASIX) 40 mg tablet TAKE 1 TABLET BY MOUTH  MONDAY, WEDNESDAY, AND  FRIDAY 39 Tab 0    glimepiride (AMARYL) 2 mg tablet Take 1 Tab by mouth daily. 90 Tab 0    albuterol (PROVENTIL HFA, VENTOLIN HFA, PROAIR HFA) 90 mcg/actuation inhaler Take 2 Puffs by inhalation every four (4) hours as needed for Wheezing. 1 Inhaler 11    glucose blood VI test strips (ONETOUCH ULTRA TEST) strip Test blood glucose daily. E11.9 100 Strip 3    cyanocobalamin 1,000 mcg tablet Take 1,000 mcg by mouth daily.  cholecalciferol (VITAMIN D3) 1,000 unit cap Take  by mouth daily.  vitamin E (AQUA GEMS) 400 unit capsule Take  by mouth daily.  fluticasone (FLONASE) 50 mcg/actuation nasal spray INHALE 2 SPRAYS IN EACH  NOSTRIL DAILY 16 g 3    triamcinolone acetonide (KENALOG) 0.1 % topical cream Apply  to affected area two (2) times a day. use thin layer 453 g 0    atorvastatin (LIPITOR) 10 mg tablet TAKE 1 TABLET BY MOUTH  DAILY 90 Tab 3    azelastine (OPTIVAR) 0.05 % ophthalmic solution Administer 1 Drop to both eyes two (2) times a day.  Use in affected eye(s) 6 mL 0    sodium bicarbonate 325 mg tablet Take 1 Tab by mouth daily. 90 Tab 0    tamsulosin (FLOMAX) 0.4 mg capsule Take 1 Cap by mouth daily (after dinner). 90 Cap 3    aspirin 81 mg chewable tablet Take 81 mg by mouth daily.  cranberry extract 450 mg tab Take  by mouth.  ascorbic acid, vitamin C, (VITAMIN C) 500 mg tablet Take 1,000 mg by mouth.  OTHER Indications: Osteo Bi-Flex 1 per day      lisinopril (PRINIVIL, ZESTRIL) 10 mg tablet TAKE 1 TABLET BY MOUTH  DAILY       No current facility-administered medications on file prior to visit. Allergies   Allergen Reactions    Norvasc [Amlodipine] Swelling       Objective:  Visit Vitals    /60 (BP 1 Location: Left arm, BP Patient Position: Sitting)  Comment: 117/59 on patient machine    Pulse 69    Temp 98.2 °F (36.8 °C) (Oral)    Ht 5' 7\" (1.702 m)    Wt 208 lb (94.3 kg)    SpO2 93%    BMI 32.58 kg/m2      Constitutional: Well developed, nourished, no distress, alert, obese habitus   CV: S1, S2.  RRR. No murmurs/rubs. No thrills palpated. No carotid bruits. Intact distal pulses. No edema. Pulm: No abnormalities on inspection. Clear to auscultation bilaterally. No wheezing/rhonchi. Normal effort.        Labwork and Ancillary Studies:    CBC w/Diff  Lab Results   Component Value Date/Time    WBC 8.3 05/21/2018 08:30 AM    HGB 12.2 (L) 05/21/2018 08:30 AM    PLATELET 553 58/79/5554 08:30 AM         Basic Metabolic Profile/LFTs  Lab Results   Component Value Date/Time    Sodium 142 05/21/2018 08:30 AM    Potassium 5.5 05/21/2018 08:30 AM    Chloride 109 (H) 05/21/2018 08:30 AM    CO2 24 05/21/2018 08:30 AM    Anion gap 9 05/21/2018 08:30 AM    Glucose 126 (H) 05/21/2018 08:30 AM    BUN 33 (H) 05/21/2018 08:30 AM    Creatinine 1.76 (H) 05/21/2018 08:30 AM    BUN/Creatinine ratio 19 05/21/2018 08:30 AM    GFR est AA 46 (L) 05/21/2018 08:30 AM    GFR est non-AA 38 (L) 05/21/2018 08:30 AM    Calcium 9.3 05/21/2018 08:30 AM      Lab Results   Component Value Date/Time    ALT (SGPT) 18 05/21/2018 08:30 AM    AST (SGOT) 14 (L) 05/21/2018 08:30 AM    Alk.  phosphatase 68 05/21/2018 08:30 AM    Bilirubin, total 0.2 05/21/2018 08:30 AM       Cholesterol  Lab Results   Component Value Date/Time    Cholesterol, total 124 05/21/2018 08:30 AM    HDL Cholesterol 46 05/21/2018 08:30 AM    LDL, calculated 64.4 05/21/2018 08:30 AM    Triglyceride 68 05/21/2018 08:30 AM    CHOL/HDL Ratio 2.7 05/21/2018 08:30 AM

## 2018-06-28 NOTE — MR AVS SNAPSHOT
[address] [facility] 
 
 
 [address] [facility] [address] [phone] Patient: Katharine Poe MRN: [id number] MGW:6[birth year]18 Visit Information Date & Time Provider Department Dept. Phone Encounter #  
 6/28/2018  1:15 PM Joni Pemberton, 3 [facility] [phone] [id number] Your Appointments 8/21/2018  7:45 AM  
Office Visit with Joni Pemberton MD  
3 [facility] [address] [facility]) Appt Note: follow up appointment [address] 2[address] [facility] [address] [phone]  
  
   
 [address] [facility]  
  
    
  
 7/9/2018  2:15 PM  
Any with Yaw Thomas MD  
[facility] of [facility]. [place] [address] [address] [facility]) Appt Note: 6 month f/u; missed 6 mt f/u so scheduled 1 yr f/u prior PSA  
 [address] [facility] [address]  
[phone]  
  
   
 [place] [address] Upcoming Health Maintenance Date Due Influenza Age 5 to Adult 8/1/2018 HEMOGLOBIN A1C Q6M 11/21/2018 FOOT EXAM Q1 12/21/2018 EYE EXAM RETINAL OR DILATED Q1 1/18/2019 MICROALBUMIN Q1 5/21/2019 LIPID PANEL Q1 5/21/2019 GLAUCOMA SCREENING Q2Y 1/18/2020 DTaP/Tdap/Td series (2 - Td) 5/19/2027 Allergies as of 6/28/2018  Review Complete On: 5/21/2018 By: Joni Pemberton MD  
  
 Severity Noted Reaction Type Reactions Norvasc [Amlodipine]  05/19/2017    Swelling Current Immunizations  Reviewed on 12/21/2017 Name Date Influenza High Dose Vaccine PF 12/21/2017  8:45 AM  
 Pneumococcal Conjugate (PCV-13) 11/7/2016 Pneumococcal Polysaccharide (PPSV-23) 5/1/2010 Zoster Vaccine, Live 10/1/2012 Not reviewed this visit You Were Diagnosed With   
  
 Codes Comments Essential hypertension    -  Primary ICD-10-CM: I10 
ICD-9-CM: 401.9  Controlled type 2 diabetes mellitus without complication, without long-term current use of insulin (HCC)     ICD-10-CM: E11.9 ICD-9-CM: 250.00 CKD (chronic kidney disease) stage 3, GFR 30-59 ml/min     ICD-10-CM: N18.3 ICD-9-CM: 501. 3 Fatigue, unspecified type     ICD-10-CM: R53.83 ICD-9-CM: 780.79 Vitals BP Pulse Temp Height(growth percentile) Weight(growth percentile) SpO2  
 110/60 (BP 1 Location: Left arm, BP Patient Position: Sitting) 69 98.2 °F (36.8 °C) (Oral) 5' 7\" (1.702 m) 208 lb (94.3 kg) 93% BMI Smoking Status 32.58 kg/m2 Never Smoker Vitals History BMI and BSA Data Body Mass Index Body Surface Area 32.58 kg/m 2 2.11 m 2 Preferred Pharmacy Pharmacy Name Phone 305 [facility], [address] [place] [address] [address] Your Updated Medication List  
  
   
This list is accurate as of 6/28/18  1:36 PM.  Always use your most recent med list.  
  
  
  
  
 * albuterol 90 mcg/actuation inhaler Commonly known as:  PROVENTIL HFA, VENTOLIN HFA, PROAIR HFA Take 2 Puffs by inhalation every four (4) hours as needed for Wheezing. * albuterol 2.5 mg /3 mL (0.083 %) nebulizer solution Commonly known as:  PROVENTIL VENTOLIN  
3 mL by Nebulization route every four (4) hours as needed for Wheezing. aspirin 81 mg chewable tablet Take 81 mg by mouth daily. atorvastatin 10 mg tablet Commonly known as:  LIPITOR  
TAKE 1 TABLET BY MOUTH  DAILY  
  
 azelastine 0.05 % ophthalmic solution Commonly known as:  OPTIVAR Administer 1 Drop to both eyes two (2) times a day. Use in affected eye(s)  
  
 cranberry extract 450 mg Tab tablet Take  by mouth.  
  
 cyanocobalamin 1,000 mcg tablet Take 1,000 mcg by mouth daily. fluticasone 50 mcg/actuation nasal spray Commonly known as:  Star Serve INHALE 2 SPRAYS IN EACH  NOSTRIL DAILY  
  
 furosemide 40 mg tablet Commonly known as:  LASIX TAKE 1 TABLET BY MOUTH  MONDAY, WEDNESDAY, AND  FRIDAY glimepiride 2 mg tablet Commonly known as:  AMARYL Take 1 Tab by mouth daily. glucose blood VI test strips strip Commonly known as:  ONETOUCH ULTRA TEST Test blood glucose daily. E11.9  
  
 nebivolol 10 mg tablet Commonly known as:  BYSTOLIC Take 1 Tab by mouth daily. OTHER Indications: Osteo Bi-Flex 1 per day  
  
 pioglitazone 30 mg tablet Commonly known as:  ACTOS Take 1 Tab by mouth daily. SITagliptin 50 mg tablet Commonly known as:  Lillie Tijerina Take 1 Tab by mouth daily. sodium bicarbonate 325 mg tablet Take 1 Tab by mouth daily. tamsulosin 0.4 mg capsule Commonly known as:  FLOMAX Take 1 Cap by mouth daily (after dinner). triamcinolone acetonide 0.1 % topical cream  
Commonly known as:  KENALOG Apply  to affected area two (2) times a day. use thin layer VITAMIN C 500 mg tablet Generic drug:  ascorbic acid (vitamin C) Take 1,000 mg by mouth. VITAMIN D3 1,000 unit Cap Generic drug:  cholecalciferol Take  by mouth daily. vitamin E 400 unit capsule Commonly known as:  [address] Take  by mouth daily. * Notice: This list has 2 medication(s) that are the same as other medications prescribed for you. Read the directions carefully, and ask your doctor or other care provider to review them with you. Prescriptions Sent to Pharmacy Refills  
 albuterol (PROVENTIL VENTOLIN) 2.5 mg /3 mL (0.083 %) nebulizer solution 0 Sig: 3 mL by Nebulization route every four (4) hours as needed for Wheezing. Class: Normal  
 Pharmacy: [facility] Ph #: [phone] Route: Nebulization To-Do List   
 06/28/2018 Echocardiography:  ECHO STRESS Introducing [facility] & HEALTH SERVICES! Dear Skye Connelly: Thank you for requesting a [employer] account. Our records indicate that you already have an active [employer] account.   You can access your account anytime at [email or web address] Did you know that you can access your hospital and ER discharge instructions at any time in [employer]? You can also review all of your test results from your hospital stay or ER visit. Additional Information If you have questions, please visit the Frequently Asked Questions section of the [employer] website at [email or web address] Remember, [employer] is NOT to be used for urgent needs. For medical emergencies, dial 911. Now available from your iPhone and Android! Please provide this summary of care documentation to your next provider. Your primary care clinician is listed as Guy Lin. If you have any questions after today's visit, please call [phone].

## 2018-08-24 DIAGNOSIS — R53.83 FATIGUE, UNSPECIFIED TYPE: ICD-10-CM

## 2018-08-28 ENCOUNTER — OFFICE VISIT (OUTPATIENT)
Dept: FAMILY MEDICINE CLINIC | Age: 78
End: 2018-08-28

## 2018-08-28 VITALS
TEMPERATURE: 97.8 F | DIASTOLIC BLOOD PRESSURE: 64 MMHG | BODY MASS INDEX: 32.96 KG/M2 | HEART RATE: 58 BPM | SYSTOLIC BLOOD PRESSURE: 150 MMHG | OXYGEN SATURATION: 94 % | WEIGHT: 210 LBS | RESPIRATION RATE: 18 BRPM | HEIGHT: 67 IN

## 2018-08-28 DIAGNOSIS — E11.9 CONTROLLED TYPE 2 DIABETES MELLITUS WITHOUT COMPLICATION, WITHOUT LONG-TERM CURRENT USE OF INSULIN (HCC): Primary | ICD-10-CM

## 2018-08-28 DIAGNOSIS — I10 ESSENTIAL HYPERTENSION: ICD-10-CM

## 2018-08-28 DIAGNOSIS — E11.21 DIABETIC NEPHROPATHY ASSOCIATED WITH TYPE 2 DIABETES MELLITUS (HCC): ICD-10-CM

## 2018-08-28 PROBLEM — N25.89 RTA (RENAL TUBULAR ACIDOSIS): Status: ACTIVE | Noted: 2018-08-28

## 2018-08-28 LAB — HBA1C MFR BLD HPLC: 7.1 %

## 2018-08-28 RX ORDER — LISINOPRIL 2.5 MG/1
2.5 TABLET ORAL DAILY
Qty: 90 TAB | Refills: 0 | Status: SHIPPED | OUTPATIENT
Start: 2018-08-28 | End: 2018-11-05 | Stop reason: SDUPTHER

## 2018-08-28 RX ORDER — GLIMEPIRIDE 2 MG/1
2 TABLET ORAL DAILY
Qty: 90 TAB | Refills: 0 | Status: SHIPPED | OUTPATIENT
Start: 2018-08-28 | End: 2018-11-05 | Stop reason: SDUPTHER

## 2018-08-28 RX ORDER — NEBIVOLOL 5 MG/1
5 TABLET ORAL DAILY
Qty: 90 TAB | Refills: 0 | Status: SHIPPED | OUTPATIENT
Start: 2018-08-28 | End: 2018-11-05 | Stop reason: SDUPTHER

## 2018-08-28 RX ORDER — FUROSEMIDE 40 MG/1
TABLET ORAL
Qty: 39 TAB | Refills: 0 | Status: SHIPPED | OUTPATIENT
Start: 2018-08-28 | End: 2018-11-05 | Stop reason: SDUPTHER

## 2018-08-28 RX ORDER — NEBIVOLOL 10 MG/1
5 TABLET ORAL DAILY
Qty: 90 TAB | Refills: 1
Start: 2018-08-28 | End: 2018-08-28 | Stop reason: SDUPTHER

## 2018-08-28 RX ORDER — PIOGLITAZONEHYDROCHLORIDE 30 MG/1
30 TABLET ORAL DAILY
Qty: 90 TAB | Refills: 0 | Status: SHIPPED | OUTPATIENT
Start: 2018-08-28 | End: 2018-11-05 | Stop reason: SDUPTHER

## 2018-08-28 NOTE — PROGRESS NOTES
Assessment/Plan:    1. Controlled type 2 diabetes mellitus without complication, without long-term current use of insulin (HCC)  A1c 7.1. Cont current regimen. - AMB POC HEMOGLOBIN A1C  - SITagliptin (JANUVIA) 50 mg tablet; Take 1 Tab by mouth daily. Dispense: 90 Tab; Refill: 0  - pioglitazone (ACTOS) 30 mg tablet; Take 1 Tab by mouth daily. Dispense: 90 Tab; Refill: 0  - glimepiride (AMARYL) 2 mg tablet; Take 1 Tab by mouth daily. Dispense: 90 Tab; Refill: 0    2. Diabetic nephropathy associated with type 2 diabetes mellitus ([facility] [address]  -restart low dose lisinopril 2.5mg. F/u in 2 mos. Get bmp in 2 weeks at Dr. Lizzie Phalen office  - lisinopril (PRINIVIL, ZESTRIL) 2.5 mg tablet; Take 1 Tab by mouth daily. Dispense: 90 Tab; Refill: 0    3. Essential hypertension- f/u in 2 mos for bp ck. Watch potassium (has h/o high nml K on ACEI), but would prefer to be on it given nephropathy.  - METABOLIC PANEL, BASIC; Future  - lisinopril (PRINIVIL, ZESTRIL) 2.5 mg tablet; Take 1 Tab by mouth daily. Dispense: 90 Tab; Refill: 0    The plan was discussed with the patient. The patient verbalized understanding and is in agreement with the plan. All medication potential side effects were discussed with the patient. Health Maintenance:   Health Maintenance   Topic Date Due    Influenza Age 5 to Adult  08/01/2018    HEMOGLOBIN A1C Q6M  11/21/2018    FOOT EXAM Q1  12/21/2018    EYE EXAM RETINAL OR DILATED Q1  01/18/2019    MICROALBUMIN Q1  05/21/2019    LIPID PANEL Q1  05/21/2019    MEDICARE YEARLY EXAM  05/22/2019    GLAUCOMA SCREENING Q2Y  01/18/2020    DTaP/Tdap/Td series (2 - Td) 05/19/2027    ZOSTER VACCINE AGE 60>  Addressed    Pneumococcal 65+ Low/Medium Risk  Completed       Angel Hernandez is a 69 y.o. male and presents with Hypertension and Diabetes     Subjective:  HTN - bp high. Dose bystolic was decr due to bradycardia and low bp with c/o fatigue. DM2 -A1c 7.1.  He's working on wt loss.      ROS:  Constitutional: No recent weight change. No weakness/fatigue. No f/c. Cardiovascular: No CP/palpitations. No TORO/orthopnea/PND. Respiratory: No cough/sputum, dyspnea, wheezing. Gastointestinal: No dysphagia, reflux. No n/v. No constipation/diarrhea. No melena/rectal bleeding. Genitourinary: No dysuria, urinary hesitancy, nocturia, hematuria. No incontinence. The problem list was updated as a part of today's visit. Patient Active Problem List   Diagnosis Code    Controlled type 2 diabetes mellitus without complication, without long-term current use of insulin (HCC) E11.9    CKD (chronic kidney disease) stage 3, GFR 30-59 ml/min N18.3    Essential hypertension I10    Elevated PSA R97.20    Benign prostatic hyperplasia with lower urinary tract symptoms N40.1    B12 deficiency E53.8    Diabetic nephropathy associated with type 2 diabetes mellitus (HCC) E11.21    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 cholecystitis K80.21    Adenomatous polyp of descending colon D12.4    Localized edema R60.0       The PSH, FH were reviewed. SH:  Social History   Substance Use Topics    Smoking status: Never Smoker    Smokeless tobacco: Never Used    Alcohol use No       Medications/Allergies:  Current Outpatient Prescriptions on File Prior to Visit   Medication Sig Dispense Refill    albuterol (PROVENTIL VENTOLIN) 2.5 mg /3 mL (0.083 %) nebulizer solution 3 mL by Nebulization route every four (4) hours as needed for Wheezing. 24 Each 0    SITagliptin (JANUVIA) 50 mg tablet Take 1 Tab by mouth daily. 90 Tab 0    pioglitazone (ACTOS) 30 mg tablet Take 1 Tab by mouth daily. 90 Tab 0    furosemide (LASIX) 40 mg tablet TAKE 1 TABLET BY MOUTH  MONDAY, WEDNESDAY, AND  FRIDAY 39 Tab 0    glimepiride (AMARYL) 2 mg tablet Take 1 Tab by mouth daily.  90 Tab 0    albuterol (PROVENTIL HFA, VENTOLIN HFA, PROAIR HFA) 90 mcg/actuation inhaler Take 2 Puffs by inhalation every four (4) hours as needed for Wheezing. 1 Inhaler 11    glucose blood VI test strips (ONETOUCH ULTRA TEST) strip Test blood glucose daily. E11.9 100 Strip 3    cyanocobalamin 1,000 mcg tablet Take 1,000 mcg by mouth daily.  cholecalciferol (VITAMIN D3) 1,000 unit cap Take  by mouth daily.  vitamin E (AQUA GEMS) 400 unit capsule Take  by mouth daily.  fluticasone (FLONASE) 50 mcg/actuation nasal spray INHALE 2 SPRAYS IN EACH  NOSTRIL DAILY 16 g 3    atorvastatin (LIPITOR) 10 mg tablet TAKE 1 TABLET BY MOUTH  DAILY 90 Tab 3    sodium bicarbonate 325 mg tablet Take 1 Tab by mouth daily. 90 Tab 0    tamsulosin (FLOMAX) 0.4 mg capsule Take 1 Cap by mouth daily (after dinner). 90 Cap 3    aspirin 81 mg chewable tablet Take 81 mg by mouth daily.  cranberry extract 450 mg tab Take  by mouth.  ascorbic acid, vitamin C, (VITAMIN C) 500 mg tablet Take 1,000 mg by mouth.  OTHER Indications: Osteo Bi-Flex 1 per day      sildenafil, antihypertensive, (REVATIO) 20 mg tablet Take 3-5 tablets one hour prior to sexual activity as needed. 90 Tab prn     No current facility-administered medications on file prior to visit. Allergies   Allergen Reactions    Norvasc [Amlodipine] Swelling     Patient states NKDA       Objective:  Visit Vitals    /64 (BP 1 Location: Right arm, BP Patient Position: Sitting)    Pulse (!) 58    Temp 97.8 °F (36.6 °C) (Oral)    Resp 18    Ht 5' 7\" (1.702 m)    Wt 210 lb (95.3 kg)    SpO2 94%    BMI 32.89 kg/m2      Constitutional: Well developed, nourished, no distress, alert   CV: S1, S2.  RRR. No murmurs/rubs. No thrills palpated. No carotid bruits. Intact distal pulses. No edema. No aortic bruits. Pulm: No abnormalities on inspection. Clear to auscultation bilaterally. No wheezing/rhonchi. Normal effort. GI: Soft, nontender, nondistended. Normal active bowel sounds.       Labwork and Ancillary Studies:    CBC w/Diff  Lab Results Component Value Date/Time    WBC 8.3 05/21/2018 08:30 AM    HGB 12.2 (L) 05/21/2018 08:30 AM    PLATELET 333 06/38/0545 08:30 AM         Basic Metabolic Profile/LFTs  Lab Results   Component Value Date/Time    Sodium 142 05/21/2018 08:30 AM    Potassium 5.5 05/21/2018 08:30 AM    Chloride 109 (H) 05/21/2018 08:30 AM    CO2 24 05/21/2018 08:30 AM    Anion gap 9 05/21/2018 08:30 AM    Glucose 126 (H) 05/21/2018 08:30 AM    BUN 33 (H) 05/21/2018 08:30 AM    Creatinine 1.76 (H) 05/21/2018 08:30 AM    BUN/Creatinine ratio 19 05/21/2018 08:30 AM    GFR est AA 46 (L) 05/21/2018 08:30 AM    GFR est non-AA 38 (L) 05/21/2018 08:30 AM    Calcium 9.3 05/21/2018 08:30 AM      Lab Results   Component Value Date/Time    ALT (SGPT) 18 05/21/2018 08:30 AM    AST (SGOT) 14 (L) 05/21/2018 08:30 AM    Alk.  phosphatase 68 05/21/2018 08:30 AM    Bilirubin, total 0.2 05/21/2018 08:30 AM       Cholesterol  Lab Results   Component Value Date/Time    Cholesterol, total 124 05/21/2018 08:30 AM    HDL Cholesterol 46 05/21/2018 08:30 AM    LDL, calculated 64.4 05/21/2018 08:30 AM    Triglyceride 68 05/21/2018 08:30 AM    CHOL/HDL Ratio 2.7 05/21/2018 08:30 AM

## 2018-08-28 NOTE — MR AVS SNAPSHOT
Abi Bedoya 
 
 
 [address]  [address]201 [facility] [id number] [phone] Patient: Davina Szymanski MRN: [id number] SVN:2/2/[birth year]63 Visit Information Date & Time Provider Department Dept. Phone Encounter #  
 8/28/2018  7:45 AM hSirin Martinez, [employer] [phone] [id number] Your Appointments 7/22/2019  8:00 AM  
ESTABLISHED PATIENT with Mellisa Melara MD  
Urology of [facility]. [facility] [address] [place] [address]) Appt Note: Return in about 1 year (around 7/9/2019). [address] [place] [address] [phone]  
  
   
 [place] [address] Upcoming Health Maintenance Date Due Influenza Age 5 to Adult 8/1/2018 HEMOGLOBIN A1C Q6M 11/21/2018 FOOT EXAM Q1 12/21/2018 EYE EXAM RETINAL OR DILATED Q1 1/18/2019 MICROALBUMIN Q1 5/21/2019 LIPID PANEL Q1 5/21/2019 MEDICARE YEARLY EXAM 5/22/2019 GLAUCOMA SCREENING Q2Y 1/18/2020 DTaP/Tdap/Td series (2 - Td) 5/19/2027 Allergies as of 8/28/2018  Review Complete On: 8/28/2018 By: Shirin Martinez MD  
  
 Severity Noted Reaction Type Reactions Norvasc [Amlodipine]  05/19/2017    Swelling Patient states NKDA Current Immunizations  Reviewed on 12/21/2017 Name Date Influenza High Dose Vaccine PF 12/21/2017  8:45 AM  
 Pneumococcal Conjugate (PCV-13) 11/7/2016 Pneumococcal Polysaccharide (PPSV-23) 5/1/2010 Zoster Vaccine, Live 10/1/2012 Not reviewed this visit You Were Diagnosed With   
  
 Codes Comments Controlled type 2 diabetes mellitus without complication, without long-term current use of insulin ([facility] [address]    -  Primary ICD-10-CM: E11.9 ICD-9-CM: 250.00 Diabetic nephropathy associated with type 2 diabetes mellitus ([facility] [address]     ICD-10-CM: E11.21 
ICD-9-CM: 250.40, 583.81 Essential hypertension     ICD-10-CM: I10 
ICD-9-CM: 401.9 Vitals BP Pulse Temp Resp Height(growth percentile) Weight(growth percentile) 150/64 (BP 1 Location: Right arm, BP Patient Position: Sitting) (!) 58 97.8 °F (36.6 °C) (Oral) 18 5' 7\" (1.702 m) 210 lb (95.3 kg) SpO2 BMI Smoking Status 94% 32.89 kg/m2 Never Smoker Vitals History BMI and BSA Data Body Mass Index Body Surface Area  
 32.89 kg/m 2 2.12 m 2 Preferred Pharmacy Pharmacy Name Phone 305 [facility], [address] [place] [address] [address] Nas[address] Your Updated Medication List  
  
   
This list is accurate as of 8/28/18  8:15 AM.  Always use your most recent med list.  
  
  
  
  
 * albuterol 90 mcg/actuation inhaler Commonly known as:  PROVENTIL HFA, VENTOLIN HFA, PROAIR HFA Take 2 Puffs by inhalation every four (4) hours as needed for Wheezing. * albuterol 2.5 mg /3 mL (0.083 %) nebulizer solution Commonly known as:  PROVENTIL VENTOLIN  
3 mL by Nebulization route every four (4) hours as needed for Wheezing. aspirin 81 mg chewable tablet Take 81 mg by mouth daily. atorvastatin 10 mg tablet Commonly known as:  LIPITOR  
TAKE 1 TABLET BY MOUTH  DAILY  
  
 cranberry extract 450 mg Tab tablet Take  by mouth.  
  
 cyanocobalamin 1,000 mcg tablet Take 1,000 mcg by mouth daily. fluticasone 50 mcg/actuation nasal spray Commonly known as:  Cee Barron INHALE 2 SPRAYS IN EACH  NOSTRIL DAILY  
  
 furosemide 40 mg tablet Commonly known as:  LASIX TAKE 1 TABLET BY MOUTH  MONDAY, WEDNESDAY, AND  FRIDAY  
  
 glimepiride 2 mg tablet Commonly known as:  AMARYL Take 1 Tab by mouth daily. glucose blood VI test strips strip Commonly known as:  ONETOUCH ULTRA TEST Test blood glucose daily. E11.9  
  
 lisinopril 2.5 mg tablet Commonly known as:  Malia Escort Take 1 Tab by mouth daily. nebivolol 5 mg tablet Commonly known as:  BYSTOLIC Take 1 Tab by mouth daily.   
  
 OTHER  
 Indications: Osteo Bi-Flex 1 per day  
  
 pioglitazone 30 mg tablet Commonly known as:  ACTOS Take 1 Tab by mouth daily. sildenafil (antihypertensive) 20 mg tablet Commonly known as:  REVATIO Take 3-5 tablets one hour prior to sexual activity as needed. SITagliptin 50 mg tablet Commonly known as:  Rajwinder Kristin Take 1 Tab by mouth daily. sodium bicarbonate 325 mg tablet Take 1 Tab by mouth daily. tamsulosin 0.4 mg capsule Commonly known as:  FLOMAX Take 1 Cap by mouth daily (after dinner). VITAMIN C 500 mg tablet Generic drug:  ascorbic acid (vitamin C) Take 1,000 mg by mouth. VITAMIN D3 1,000 unit Cap Generic drug:  cholecalciferol Take  by mouth daily. vitamin E 400 unit capsule Commonly known as:  [address] Take  by mouth daily. * Notice: This list has 2 medication(s) that are the same as other medications prescribed for you. Read the directions carefully, and ask your doctor or other care provider to review them with you. Prescriptions Sent to Pharmacy Refills  
 nebivolol (BYSTOLIC) 5 mg tablet 0 Sig: Take 1 Tab by mouth daily. Class: Normal  
 Pharmacy: [address] [place] [address]. [address] Ph #: [phone] Route: Oral  
 lisinopril (PRINIVIL, ZESTRIL) 2.5 mg tablet 0 Sig: Take 1 Tab by mouth daily. Class: Normal  
 Pharmacy: [address] [place] [address] . [address] Ph #: [phone] Route: Oral  
 SITagliptin (JANUVIA) 50 mg tablet 0 Sig: Take 1 Tab by mouth daily. Class: Normal  
 Pharmacy: [address] [place] [address] . [address] Ph #: [phone] Route: Oral  
 pioglitazone (ACTOS) 30 mg tablet 0 Sig: Take 1 Tab by mouth daily. Class: Normal  
 Pharmacy: [address] [place] [address], . [address] Ph #: [phone]  Route: Oral  
 furosemide (LASIX) 40 mg tablet 0  
 Sig: TAKE 1 TABLET BY MOUTH  MONDAY, WEDNESDAY, AND  FRIDAY Class: Normal  
 Pharmacy: [address] Ph #: [phone]  
 glimepiride (AMARYL) 2 mg tablet 0 Sig: Take 1 Tab by mouth daily. Class: Normal  
 Pharmacy: [address] Ph #: [phone] Route: Oral  
  
We Performed the Following AMB POC HEMOGLOBIN A1C [81886 CPT(R)] To-Do List   
 08/28/2018 Lab:  METABOLIC PANEL, BASIC Introducing [facility] & HEALTH SERVICES! Dear Marta Certain: Thank you for requesting a [employer] account. Our records indicate that you already have an active [employer] account. You can access your account anytime at [email or web address]/"[email or web address] Did you know that you can access your hospital and ER discharge instructions at any time in [employer]? You can also review all of your test results from your hospital stay or ER visit. Additional Information If you have questions, please visit the Frequently Asked Questions section of the [employer] website at [email or web address]/. Remember, [employer] is NOT to be used for urgent needs. For medical emergencies, dial 911. Now available from your iPhone and Android! Please provide this summary of care documentation to your next provider. Your primary care clinician is listed as Guy Lin. If you have any questions after today's visit, please call [phone].

## 2018-08-28 NOTE — PROGRESS NOTES
Claude Milling is a 69 y.o. male (: 1940) presenting to address:    Chief Complaint   Patient presents with    Hypertension    Diabetes       Vitals:    18 0745   BP: 150/64   Pulse: (!) 58   Resp: 18   Temp: 97.8 °F (36.6 °C)   TempSrc: Oral   SpO2: 94%   Weight: 210 lb (95.3 kg)   Height: 5' 7\" (1.702 m)   PainSc:   2   PainLoc: Generalized       Learning Assessment:     Learning Assessment 2017   PRIMARY LEARNER Patient   HIGHEST LEVEL OF EDUCATION - PRIMARY LEARNER  4 YEARS OF COLLEGE   BARRIERS PRIMARY LEARNER NONE   CO-LEARNER CAREGIVER No   PRIMARY LANGUAGE ENGLISH    NEED No   LEARNER PREFERENCE PRIMARY VIDEOS   ANSWERED BY self   RELATIONSHIP SELF     Depression Screening:     PHQ over the last two weeks 2018   Little interest or pleasure in doing things Not at all   Feeling down, depressed, irritable, or hopeless Not at all   Total Score PHQ 2 0     Fall Risk Assessment:     Fall Risk Assessment, last 12 mths 2018   Able to walk? Yes   Fall in past 12 months? No     Abuse Screening:     Abuse Screening Questionnaire 2017   Do you ever feel afraid of your partner? N   Are you in a relationship with someone who physically or mentally threatens you? N   Is it safe for you to go home? Y     Coordination of Care Questionaire:   1. Have you been to the ER, urgent care clinic since your last visit? Hospitalized since your last visit? NO    2. Have you seen or consulted any other health care providers outside of the [address] [place] [address] since your last visit? Include any pap smears or colon screening. YES Urology 18    Advanced Directive:   1. Do you have an Advanced Directive? YES    2. Would you like information on Advanced Directives?  NO

## 2018-11-05 ENCOUNTER — OFFICE VISIT (OUTPATIENT)
Dept: FAMILY MEDICINE CLINIC | Age: 78
End: 2018-11-05

## 2018-11-05 ENCOUNTER — HOSPITAL ENCOUNTER (OUTPATIENT)
Dept: LAB | Age: 78
Discharge: HOME | End: 2018-11-05
Payer: COMMERCIAL

## 2018-11-05 VITALS
BODY MASS INDEX: 33.9 KG/M2 | DIASTOLIC BLOOD PRESSURE: 62 MMHG | HEIGHT: 67 IN | RESPIRATION RATE: 16 BRPM | TEMPERATURE: 97.7 F | SYSTOLIC BLOOD PRESSURE: 130 MMHG | WEIGHT: 216 LBS | OXYGEN SATURATION: 97 % | HEART RATE: 61 BPM

## 2018-11-05 DIAGNOSIS — E11.9 CONTROLLED TYPE 2 DIABETES MELLITUS WITHOUT COMPLICATION, WITHOUT LONG-TERM CURRENT USE OF INSULIN (HCC): ICD-10-CM

## 2018-11-05 DIAGNOSIS — D64.9 ANEMIA, UNSPECIFIED TYPE: ICD-10-CM

## 2018-11-05 DIAGNOSIS — I10 ESSENTIAL HYPERTENSION: Primary | ICD-10-CM

## 2018-11-05 DIAGNOSIS — E11.21 DIABETIC NEPHROPATHY ASSOCIATED WITH TYPE 2 DIABETES MELLITUS (HCC): ICD-10-CM

## 2018-11-05 DIAGNOSIS — Z23 ENCOUNTER FOR IMMUNIZATION: ICD-10-CM

## 2018-11-05 DIAGNOSIS — I10 ESSENTIAL HYPERTENSION: ICD-10-CM

## 2018-11-05 DIAGNOSIS — L73.9 FOLLICULITIS: ICD-10-CM

## 2018-11-05 LAB
ANION GAP SERPL CALC-SCNC: 7 MMOL/L (ref 3–18)
BASOPHILS # BLD: 0 K/UL (ref 0–0.1)
BASOPHILS NFR BLD: 1 % (ref 0–2)
BUN SERPL-MCNC: 26 MG/DL (ref 7–18)
BUN/CREAT SERPL: 19 (ref 12–20)
CALCIUM SERPL-MCNC: 9.2 MG/DL (ref 8.5–10.1)
CHLORIDE SERPL-SCNC: 108 MMOL/L (ref 100–108)
CO2 SERPL-SCNC: 26 MMOL/L (ref 21–32)
CREAT SERPL-MCNC: 1.38 MG/DL (ref 0.6–1.3)
DIFFERENTIAL METHOD BLD: ABNORMAL
EOSINOPHIL # BLD: 0.3 K/UL (ref 0–0.4)
EOSINOPHIL NFR BLD: 5 % (ref 0–5)
ERYTHROCYTE [DISTWIDTH] IN BLOOD BY AUTOMATED COUNT: 13.1 % (ref 11.6–14.5)
FOLATE SERPL-MCNC: >20 NG/ML (ref 3.1–17.5)
GLUCOSE SERPL-MCNC: 171 MG/DL (ref 74–99)
HCT VFR BLD AUTO: 42.1 % (ref 36–48)
HGB BLD-MCNC: 13.5 G/DL (ref 13–16)
IRON SATN MFR SERPL: 46 %
IRON SERPL-MCNC: 123 UG/DL (ref 50–175)
LYMPHOCYTES # BLD: 1.9 K/UL (ref 0.9–3.6)
LYMPHOCYTES NFR BLD: 28 % (ref 21–52)
MCH RBC QN AUTO: 30.1 PG (ref 24–34)
MCHC RBC AUTO-ENTMCNC: 32.1 G/DL (ref 31–37)
MCV RBC AUTO: 93.8 FL (ref 74–97)
MONOCYTES # BLD: 0.7 K/UL (ref 0.05–1.2)
MONOCYTES NFR BLD: 10 % (ref 3–10)
NEUTS SEG # BLD: 3.8 K/UL (ref 1.8–8)
NEUTS SEG NFR BLD: 56 % (ref 40–73)
PLATELET # BLD AUTO: 199 K/UL (ref 135–420)
PMV BLD AUTO: 10.2 FL (ref 9.2–11.8)
POTASSIUM SERPL-SCNC: 4.8 MMOL/L (ref 3.5–5.5)
RBC # BLD AUTO: 4.49 M/UL (ref 4.7–5.5)
SODIUM SERPL-SCNC: 141 MMOL/L (ref 136–145)
TIBC SERPL-MCNC: 268 UG/DL (ref 250–450)
WBC # BLD AUTO: 6.7 K/UL (ref 4.6–13.2)

## 2018-11-05 PROCEDURE — 85025 COMPLETE CBC W/AUTO DIFF WBC: CPT | Performed by: INTERNAL MEDICINE

## 2018-11-05 PROCEDURE — 82746 ASSAY OF FOLIC ACID SERUM: CPT | Performed by: INTERNAL MEDICINE

## 2018-11-05 PROCEDURE — 80048 BASIC METABOLIC PNL TOTAL CA: CPT | Performed by: INTERNAL MEDICINE

## 2018-11-05 PROCEDURE — 83540 ASSAY OF IRON: CPT | Performed by: INTERNAL MEDICINE

## 2018-11-05 PROCEDURE — 36415 COLL VENOUS BLD VENIPUNCTURE: CPT | Performed by: INTERNAL MEDICINE

## 2018-11-05 RX ORDER — PIOGLITAZONEHYDROCHLORIDE 30 MG/1
30 TABLET ORAL DAILY
Qty: 90 TAB | Refills: 0 | Status: SHIPPED | OUTPATIENT
Start: 2018-11-05 | End: 2019-02-05 | Stop reason: SDUPTHER

## 2018-11-05 RX ORDER — DOXYCYCLINE 100 MG/1
100 CAPSULE ORAL 2 TIMES DAILY
Qty: 14 CAP | Refills: 0 | Status: SHIPPED | OUTPATIENT
Start: 2018-11-05 | End: 2018-11-12

## 2018-11-05 RX ORDER — LISINOPRIL 2.5 MG/1
2.5 TABLET ORAL DAILY
Qty: 90 TAB | Refills: 0 | Status: SHIPPED | OUTPATIENT
Start: 2018-11-05 | End: 2019-02-05 | Stop reason: SDUPTHER

## 2018-11-05 RX ORDER — HYDRALAZINE HYDROCHLORIDE 25 MG/1
25 TABLET, FILM COATED ORAL 3 TIMES DAILY
COMMUNITY
End: 2019-03-27

## 2018-11-05 RX ORDER — GLIMEPIRIDE 2 MG/1
2 TABLET ORAL DAILY
Qty: 90 TAB | Refills: 0 | Status: SHIPPED | OUTPATIENT
Start: 2018-11-05 | End: 2019-02-05

## 2018-11-05 RX ORDER — NEBIVOLOL 5 MG/1
5 TABLET ORAL DAILY
Qty: 90 TAB | Refills: 0 | Status: SHIPPED | OUTPATIENT
Start: 2018-11-05 | End: 2019-02-05 | Stop reason: SDUPTHER

## 2018-11-05 RX ORDER — FUROSEMIDE 40 MG/1
TABLET ORAL
Qty: 39 TAB | Refills: 0 | Status: SHIPPED | OUTPATIENT
Start: 2018-11-05 | End: 2019-02-05 | Stop reason: SDUPTHER

## 2018-11-05 NOTE — PROGRESS NOTES
Assessment/Plan: 1. Essential hypertension 
-cont current regimen. F/u in 3mos 2. Folliculitis 
- doxycycline (MONODOX) 100 mg capsule; Take 1 Cap by mouth two (2) times a day for 7 days. Dispense: 14 Cap; Refill: 0 
  
3. Encounter for immunization 
- INFLUENZA VACCINE INACTIVATED (IIV), SUBUNIT, ADJUVANTED, IM 
- CT IMMUNIZ ADMIN,1 SINGLE/COMB VAC/TOXOID 4. Controlled type 2 diabetes mellitus without complication, without long-term current use of insulin ([address] -refilled. F/u in 3 mo 
- SITagliptin (JANUVIA) 50 mg tablet; Take 1 Tab by mouth daily. Dispense: 90 Tab; Refill: 0 
- pioglitazone (ACTOS) 30 mg tablet; Take 1 Tab by mouth daily. Dispense: 90 Tab; Refill: 0 
- glimepiride (AMARYL) 2 mg tablet; Take 1 Tab by mouth daily. Dispense: 90 Tab; Refill: 0 The plan was discussed with the patient. The patient verbalized understanding and is in agreement with the plan. All medication potential side effects were discussed with the patient. Health Maintenance:  
Health Maintenance Topic Date Due  Shingrix Vaccine Age 50> (1 of 2) 06/02/1991  Influenza Age 5 to Adult  08/01/2018  
 FOOT EXAM Q1  12/21/2018  
 EYE EXAM RETINAL OR DILATED Q1  01/18/2019  
 HEMOGLOBIN A1C Q6M  02/28/2019  MICROALBUMIN Q1  05/21/2019  LIPID PANEL Q1  05/21/2019  MEDICARE YEARLY EXAM  05/22/2019  GLAUCOMA SCREENING Q2Y  01/18/2020  
 DTaP/Tdap/Td series (2 - Td) 05/19/2027  Pneumococcal 65+ Low/Medium Risk  Completed Carisa Caba is a 69 y.o. male and presents with Hypertension and Skin Problem Subjective: HTN - lisinopril restarted. Bmp one month after starting showed nml K (has h/o hyperkalemia on ACEI). Is followed by renal as well. Pt c/o rash on chest x 3-4 weeks. No new exposures. Has tried otc \"fungal soaps\" w/o relief. ROS: 
Constitutional: No recent weight change. No weakness/fatigue. No f/c. Skin: No rashes, change in nails/hair, itching Cardiovascular: No CP/palpitations. No TORO/orthopnea/PND. Respiratory: No cough/sputum, dyspnea, wheezing. Gastointestinal: No dysphagia, reflux. No n/v. No constipation/diarrhea. No melena/rectal bleeding. The problem list was updated as a part of today's visit. Patient Active Problem List  
Diagnosis Code  Controlled type 2 diabetes mellitus without complication, without long-term current use of insulin (HCC) E11.9  CKD (chronic kidney disease) stage 3, GFR 30-59 ml/min ([facility]) N18.3  Essential hypertension I10  Elevated PSA R97.20  Benign prostatic hyperplasia with lower urinary tract symptoms N40.1  B12 deficiency E53.8  Diabetic nephropathy associated with type 2 diabetes mellitus (HCC) E11.21  
 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 cholecystitis K80.21  
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 SH: Social History Tobacco Use  Smoking status: Never Smoker  Smokeless tobacco: Never Used Substance Use Topics  Alcohol use: No  
 Drug use: No  
 
 
Medications/Allergies: 
Current Outpatient Medications on File Prior to Visit Medication Sig Dispense Refill  tamsulosin (FLOMAX) 0.4 mg capsule TAKE 1 CAPSULE BY MOUTH  DAILY AFTER DINNER 90 Cap 2  
 nebivolol (BYSTOLIC) 5 mg tablet Take 1 Tab by mouth daily. 90 Tab 0  
 lisinopril (PRINIVIL, ZESTRIL) 2.5 mg tablet Take 1 Tab by mouth daily. 90 Tab 0  
 SITagliptin (JANUVIA) 50 mg tablet Take 1 Tab by mouth daily. 90 Tab 0  pioglitazone (ACTOS) 30 mg tablet Take 1 Tab by mouth daily. 90 Tab 0  
 furosemide (LASIX) 40 mg tablet TAKE 1 TABLET BY MOUTH  MONDAY, WEDNESDAY, AND  FRIDAY 39 Tab 0  
 glimepiride (AMARYL) 2 mg tablet Take 1 Tab by mouth daily.  90 Tab 0  
 sildenafil, antihypertensive, (REVATIO) 20 mg tablet Take 3-5 tablets one hour prior to sexual activity as needed. 90 Tab prn  albuterol (PROVENTIL VENTOLIN) 2.5 mg /3 mL (0.083 %) nebulizer solution 3 mL by Nebulization route every four (4) hours as needed for Wheezing. 24 Each 0  
 albuterol (PROVENTIL HFA, VENTOLIN HFA, PROAIR HFA) 90 mcg/actuation inhaler Take 2 Puffs by inhalation every four (4) hours as needed for Wheezing. 1 Inhaler 11  
 glucose blood VI test strips (ONETOUCH ULTRA TEST) strip Test blood glucose daily. E11.9 100 Strip 3  cyanocobalamin 1,000 mcg tablet Take 1,000 mcg by mouth daily.  cholecalciferol (VITAMIN D3) 1,000 unit cap Take  by mouth daily.  vitamin E (AQUA GEMS) 400 unit capsule Take  by mouth daily.  fluticasone (FLONASE) 50 mcg/actuation nasal spray INHALE 2 SPRAYS IN EACH  NOSTRIL DAILY 16 g 3  
 atorvastatin (LIPITOR) 10 mg tablet TAKE 1 TABLET BY MOUTH  DAILY 90 Tab 3  
 sodium bicarbonate 325 mg tablet Take 1 Tab by mouth daily. 90 Tab 0  
 aspirin 81 mg chewable tablet Take 81 mg by mouth daily.  cranberry extract 450 mg tab Take  by mouth.  ascorbic acid, vitamin C, (VITAMIN C) 500 mg tablet Take 1,000 mg by mouth.  OTHER Indications: Osteo Bi-Flex 1 per day No current facility-administered medications on file prior to visit. Allergies Allergen Reactions  Norvasc [Amlodipine] Swelling Patient states NKDA Objective: 
Visit Vitals /62 (BP 1 Location: Left arm, BP Patient Position: Sitting) Pulse 61 Temp 97.7 °F (36.5 °C) (Oral) Resp 16 Ht 5' 7\" (1.702 m) Wt 216 lb (98 kg) SpO2 97% BMI 33.83 kg/m² Constitutional: Well developed, nourished, no distress, alert, obese habitus CV: S1, S2.  RRR. No murmurs/rubs. No thrills palpated. No carotid bruits. Intact distal pulses. No edema. No aortic bruits. Pulm: No abnormalities on inspection. Clear to auscultation bilaterally. No wheezing/rhonchi. Normal effort. Skin: Erythematous papules and pustules on L chest.  Pustules centered around follicles. Some papules not follicle based

## 2018-11-05 NOTE — PROGRESS NOTES
Flu shot Immunization/s administered 11/5/2018 by Lou Barreto LPN with guardian's consent. Patient tolerated procedure well. No reactions noted.

## 2018-11-05 NOTE — PROGRESS NOTES
Kennth Lefort is a 69 y.o. male (: 1940) presenting to address: Chief Complaint Patient presents with  Hypertension  Skin Problem Vitals:  
 18 BP: 130/62 Pulse: 61 Resp: 16 Temp: 97.7 °F (36.5 °C) TempSrc: Oral  
SpO2: 97% Weight: 216 lb (98 kg) Height: 5' 7\" (1.702 m) Hearing/Vision: No exam data present Learning Assessment:  
 
Learning Assessment 2017 PRIMARY LEARNER Patient HIGHEST LEVEL OF EDUCATION - PRIMARY LEARNER  4 YEARS OF COLLEGE  
BARRIERS PRIMARY LEARNER NONE  
CO-LEARNER CAREGIVER No  
PRIMARY LANGUAGE ENGLISH  NEED No  
LEARNER PREFERENCE PRIMARY VIDEOS  
ANSWERED BY self RELATIONSHIP SELF Depression Screening: PHQ over the last two weeks 2018 Little interest or pleasure in doing things Not at all Feeling down, depressed, irritable, or hopeless Not at all Total Score PHQ 2 0 Fall Risk Assessment:  
 
Fall Risk Assessment, last 12 mths 2018 Able to walk? Yes Fall in past 12 months? No  
 
Abuse Screening:  
 
Abuse Screening Questionnaire 2018 Do you ever feel afraid of your partner? Camille Mcgarry Are you in a relationship with someone who physically or mentally threatens you? Camille Mcgarry Is it safe for you to go home? [facility] Coordination of Care Questionaire: 1. Have you been to the ER, urgent care clinic since your last visit? Hospitalized since your last visit? Yes, urgent care bronchitis 2018 2. Have you seen or consulted any other health care providers outside of the [address] [place] [address] since your last visit? Include any pap smears or colon screening. Yes, nephro 2018 and chiropractor Advanced Directive: 1. Do you have an Advanced Directive? Yes  
2. Would you like information on Advanced Directives? No  
Health Maintenance Due Topic Date Due  Shingrix Vaccine Age 50> (1 of 2) 1991  Influenza Age 5 to Adult  2018 Wants flu vaccine

## 2018-11-21 RX ORDER — ATORVASTATIN CALCIUM 10 MG/1
TABLET, FILM COATED ORAL
Qty: 90 TAB | Refills: 3 | Status: SHIPPED | OUTPATIENT
Start: 2018-11-21 | End: 2019-07-12 | Stop reason: SDUPTHER

## 2018-12-18 RX ORDER — ALBUTEROL SULFATE 0.83 MG/ML
2.5 SOLUTION RESPIRATORY (INHALATION)
Qty: 24 EACH | Refills: 1 | Status: SHIPPED | OUTPATIENT
Start: 2018-12-18

## 2018-12-18 NOTE — TELEPHONE ENCOUNTER
Patients wife is asking is asking for a prescription for albuterol for the patients nebulizer. It has reached that time of year that he needs to use his nebulizer.   Please send to [address] [place], [place] [address]lin[place]

## 2018-12-21 ENCOUNTER — OFFICE VISIT (OUTPATIENT)
Dept: FAMILY MEDICINE CLINIC | Age: 78
End: 2018-12-21

## 2018-12-21 VITALS
BODY MASS INDEX: 34.37 KG/M2 | DIASTOLIC BLOOD PRESSURE: 63 MMHG | RESPIRATION RATE: 18 BRPM | HEIGHT: 67 IN | WEIGHT: 219 LBS | OXYGEN SATURATION: 91 % | TEMPERATURE: 97.5 F | SYSTOLIC BLOOD PRESSURE: 119 MMHG | HEART RATE: 54 BPM

## 2018-12-21 DIAGNOSIS — J20.9 ACUTE BRONCHITIS, UNSPECIFIED ORGANISM: Primary | ICD-10-CM

## 2018-12-21 RX ORDER — DOXYCYCLINE 100 MG/1
CAPSULE ORAL
COMMUNITY
Start: 2018-12-20 | End: 2019-02-05 | Stop reason: ALTCHOICE

## 2018-12-21 RX ORDER — LEVOFLOXACIN 250 MG/1
TABLET ORAL
Qty: 8 TAB | Refills: 0 | Status: SHIPPED | OUTPATIENT
Start: 2018-12-21 | End: 2019-02-05 | Stop reason: ALTCHOICE

## 2018-12-21 RX ORDER — BENZONATATE 200 MG/1
CAPSULE ORAL
COMMUNITY
Start: 2018-12-20 | End: 2019-02-05 | Stop reason: ALTCHOICE

## 2018-12-21 RX ORDER — PREDNISONE 10 MG/1
TABLET ORAL
Qty: 21 TAB | Refills: 0 | Status: SHIPPED | OUTPATIENT
Start: 2018-12-21 | End: 2019-02-05 | Stop reason: ALTCHOICE

## 2018-12-21 RX ORDER — POLYMYXIN B SULFATE AND TRIMETHOPRIM 1; 10000 MG/ML; [USP'U]/ML
SOLUTION OPHTHALMIC
COMMUNITY
Start: 2018-12-20 | End: 2019-02-05 | Stop reason: ALTCHOICE

## 2018-12-21 RX ORDER — ALBUTEROL SULFATE 90 UG/1
AEROSOL, METERED RESPIRATORY (INHALATION)
COMMUNITY
End: 2019-07-22 | Stop reason: SDUPTHER

## 2018-12-21 NOTE — PROGRESS NOTES
HISTORY OF PRESENT ILLNESS  Brown Pacheco is a 69 y.o. male. HPI  C/o cough, chest congestion, wheezing, started 5 days ago, he went to urgent care 2 days ago, given doxycycline and tessalon, not any better, he is using his nebulizer QID  He stated that he had cxr at  that was negative for PNA  Review of Systems   HENT: Negative for ear pain and sore throat. Respiratory: Positive for cough, sputum production and wheezing. Negative for shortness of breath. Cardiovascular: Negative for chest pain. Physical Exam   HENT:   Right Ear: Tympanic membrane normal.   Left Ear: Tympanic membrane normal.   Mouth/Throat: Posterior oropharyngeal erythema present. No oropharyngeal exudate. Pulmonary/Chest: Effort normal. He has no wheezes. He has rhonchi (bilateral diffuse expiratory). He has no rales. He exhibits no tenderness. Lymphadenopathy:     He has no cervical adenopathy. Vitals reviewed. ASSESSMENT and PLAN  Diagnoses and all orders for this visit:    1. Acute bronchitis, unspecified organism  -     METHYLPREDNISOLONE ACETATE INJECTION 40 MG  -     predniSONE (STERAPRED DS) 10 mg dose pack; See administration instruction per 10mg dose pack  -     levoFLOXacin (LEVAQUIN) 250 mg tablet;  Take 2 tablets today, then take one tablet daily for 6 more days starting tomorrow    stop Doxycycline  Follow up if not better next week,  Advised to go to the ER if worsening

## 2018-12-21 NOTE — PROGRESS NOTES
Elida Wang is a 69 y.o. male (: 1940) presenting to address:    Chief Complaint   Patient presents with    Cough     times five days       Vitals:    18 1413   BP: 119/63   Pulse: (!) 54   Resp: 18   Temp: 97.5 °F (36.4 °C)   TempSrc: Oral   SpO2: 91%   Weight: 219 lb (99.3 kg)   Height: 5' 7\" (1.702 m)   PainSc:   2   PainLoc: Chest       Hearing/Vision:   No exam data present    Learning Assessment:     Learning Assessment 2017   PRIMARY LEARNER Patient   HIGHEST LEVEL OF EDUCATION - PRIMARY LEARNER  4 YEARS OF COLLEGE   BARRIERS PRIMARY LEARNER NONE   CO-LEARNER CAREGIVER No   PRIMARY LANGUAGE ENGLISH    NEED No   LEARNER PREFERENCE PRIMARY VIDEOS   ANSWERED BY self   RELATIONSHIP SELF     Depression Screening:     PHQ over the last two weeks 2018   Little interest or pleasure in doing things Not at all   Feeling down, depressed, irritable, or hopeless Not at all   Total Score PHQ 2 0     Fall Risk Assessment:     Fall Risk Assessment, last 12 mths 2018   Able to walk? Yes   Fall in past 12 months? No     Abuse Screening:     Abuse Screening Questionnaire 2018   Do you ever feel afraid of your partner? N   Are you in a relationship with someone who physically or mentally threatens you? N   Is it safe for you to go home? Y     Coordination of Care Questionaire:   1. Have you been to the ER, urgent care clinic since your last visit? Hospitalized since your last visit? YES   Urgent Care 18 for cough. 2. Have you seen or consulted any other health care providers outside of the [address] [place] [address] since your last visit? Include any pap smears or colon screening. NO    Advanced Directive:   1. Do you have an Advanced Directive? NO    2. Would you like information on Advanced Directives?  NO

## 2019-01-14 LAB — HBA1C MFR BLD HPLC: 8.3 %

## 2019-01-15 ENCOUNTER — TELEPHONE (OUTPATIENT)
Dept: FAMILY MEDICINE CLINIC | Age: 79
End: 2019-01-15

## 2019-01-16 DIAGNOSIS — E53.8 B12 DEFICIENCY: Primary | ICD-10-CM

## 2019-01-18 ENCOUNTER — HOSPITAL ENCOUNTER (OUTPATIENT)
Dept: LAB | Age: 79
Discharge: HOME | End: 2019-01-18
Payer: COMMERCIAL

## 2019-01-18 DIAGNOSIS — E53.8 B12 DEFICIENCY: ICD-10-CM

## 2019-01-18 LAB — VIT B12 SERPL-MCNC: >2000 PG/ML (ref 211–911)

## 2019-01-18 PROCEDURE — 36415 COLL VENOUS BLD VENIPUNCTURE: CPT

## 2019-01-18 PROCEDURE — 82607 VITAMIN B-12: CPT

## 2019-02-05 ENCOUNTER — TELEPHONE (OUTPATIENT)
Dept: FAMILY MEDICINE CLINIC | Age: 79
End: 2019-02-05

## 2019-02-05 ENCOUNTER — OFFICE VISIT (OUTPATIENT)
Dept: FAMILY MEDICINE CLINIC | Age: 79
End: 2019-02-05

## 2019-02-05 VITALS
DIASTOLIC BLOOD PRESSURE: 64 MMHG | BODY MASS INDEX: 33.9 KG/M2 | SYSTOLIC BLOOD PRESSURE: 112 MMHG | OXYGEN SATURATION: 95 % | RESPIRATION RATE: 18 BRPM | HEART RATE: 56 BPM | TEMPERATURE: 97.8 F | HEIGHT: 67 IN | WEIGHT: 216 LBS

## 2019-02-05 DIAGNOSIS — I10 ESSENTIAL HYPERTENSION: ICD-10-CM

## 2019-02-05 DIAGNOSIS — E11.9 CONTROLLED TYPE 2 DIABETES MELLITUS WITHOUT COMPLICATION, WITHOUT LONG-TERM CURRENT USE OF INSULIN (HCC): Primary | ICD-10-CM

## 2019-02-05 DIAGNOSIS — R97.20 ELEVATED PSA: ICD-10-CM

## 2019-02-05 DIAGNOSIS — E11.21 DIABETIC NEPHROPATHY ASSOCIATED WITH TYPE 2 DIABETES MELLITUS (HCC): ICD-10-CM

## 2019-02-05 RX ORDER — PIOGLITAZONEHYDROCHLORIDE 30 MG/1
30 TABLET ORAL DAILY
Qty: 90 TAB | Refills: 0 | Status: SHIPPED | OUTPATIENT
Start: 2019-02-05 | End: 2019-02-08 | Stop reason: SDUPTHER

## 2019-02-05 RX ORDER — FUROSEMIDE 40 MG/1
TABLET ORAL
Qty: 39 TAB | Refills: 1 | Status: SHIPPED | OUTPATIENT
Start: 2019-02-05 | End: 2019-02-08 | Stop reason: SDUPTHER

## 2019-02-05 RX ORDER — NEBIVOLOL 5 MG/1
5 TABLET ORAL DAILY
Qty: 90 TAB | Refills: 1 | Status: SHIPPED | OUTPATIENT
Start: 2019-02-05 | End: 2019-02-08 | Stop reason: SDUPTHER

## 2019-02-05 RX ORDER — LISINOPRIL 2.5 MG/1
2.5 TABLET ORAL DAILY
Qty: 90 TAB | Refills: 1 | Status: SHIPPED | OUTPATIENT
Start: 2019-02-05 | End: 2019-02-08 | Stop reason: SDUPTHER

## 2019-02-05 RX ORDER — TRIAMCINOLONE ACETONIDE 1 MG/G
CREAM TOPICAL 2 TIMES DAILY
Qty: 453 G | Refills: 3 | Status: SHIPPED | OUTPATIENT
Start: 2019-02-05 | End: 2019-02-08 | Stop reason: SDUPTHER

## 2019-02-05 RX ORDER — GLIMEPIRIDE 2 MG/1
4 TABLET ORAL DAILY
Qty: 180 TAB | Refills: 0 | Status: SHIPPED | OUTPATIENT
Start: 2019-02-05 | End: 2019-02-08 | Stop reason: SDUPTHER

## 2019-02-05 NOTE — TELEPHONE ENCOUNTER
Pharmacy is calling to ask for a dosage change for triamcinoline acetonide   % cream from 453g to 454g

## 2019-02-05 NOTE — PROGRESS NOTES
Assessment/Plan: 1. Controlled type 2 diabetes mellitus without complication, without long-term current use of insulin ([address] 
-should improve off steroids. Cont current. If bs starts to drop, go back to 2mg amaryl. 
-  DIABETES FOOT EXAM 
- glimepiride (AMARYL) 2 mg tablet; Take 2 Tabs by mouth daily. Dispense: 180 Tab; Refill: 0 
- HEMOGLOBIN A1C W/O EAG; Future 
- SITagliptin (JANUVIA) 50 mg tablet; Take 1 Tab by mouth daily. Dispense: 90 Tab; Refill: 0 
- pioglitazone (ACTOS) 30 mg tablet; Take 1 Tab by mouth daily. Dispense: 90 Tab; Refill: 0 
 
2. Elevated PSA 
- PSA W/ REFLX FREE PSA; Future 3. Diabetic nephropathy associated with type 2 diabetes mellitus (HCC) 
-cont current 
- lisinopril (PRINIVIL, ZESTRIL) 2.5 mg tablet; Take 1 Tab by mouth daily. Dispense: 90 Tab; Refill: 1 4. Essential hypertension 
-cont current 
- lisinopril (PRINIVIL, ZESTRIL) 2.5 mg tablet; Take 1 Tab by mouth daily. Dispense: 90 Tab; Refill: 1 
- LIPID PANEL; Future - METABOLIC PANEL, BASIC; Future The plan was discussed with the patient. The patient verbalized understanding and is in agreement with the plan. All medication potential side effects were discussed with the patient. Health Maintenance:  
Health Maintenance Topic Date Due  Shingrix Vaccine Age 50> (2 of 2) 12/27/2018  MICROALBUMIN Q1  05/21/2019  LIPID PANEL Q1  05/21/2019  MEDICARE YEARLY EXAM  05/22/2019  
 HEMOGLOBIN A1C Q6M  07/14/2019  GLAUCOMA SCREENING Q2Y  01/18/2020  
 EYE EXAM RETINAL OR DILATED  01/18/2020  
 FOOT EXAM Q1  02/05/2020  
 DTaP/Tdap/Td series (2 - Td) 05/19/2027  Pneumococcal 65+ Low/Medium Risk  Completed  Influenza Age 5 to Adult  Completed Alexandria Mccurdy is a 69 y.o. male and presents with Cholesterol Problem and Follow Up Chronic Condition Subjective: 
DM2 - A1c 8.3. Sugars had been high due to steroid injection.   Now better controlled, bs 120s in am.  We had increased amaryl to 4mg daily. ROS: 
Constitutional: No recent weight change. No weakness/fatigue. No f/c. Cardiovascular: No CP/palpitations. No TORO/orthopnea/PND. Respiratory: No cough/sputum, dyspnea, wheezing. Gastointestinal: No dysphagia, reflux. No n/v. No constipation/diarrhea. No melena/rectal bleeding. The problem list was updated as a part of today's visit. Patient Active Problem List  
Diagnosis Code  Controlled type 2 diabetes mellitus without complication, without long-term current use of insulin ([facility]) E11.9  CKD (chronic kidney disease) stage 3, GFR 30-59 ml/min ([facility]) N18.3  Essential hypertension I10  Elevated PSA R97.20  Benign prostatic hyperplasia with lower urinary tract symptoms N40.1  B12 deficiency E53.8  Diabetic nephropathy associated with type 2 diabetes mellitus ([facility]) E11.21  
 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 cholecystitis K80.21  
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 SH: Social History Tobacco Use  Smoking status: Never Smoker  Smokeless tobacco: Never Used Substance Use Topics  Alcohol use: No  
 Drug use: No  
 
 
Medications/Allergies: 
Current Outpatient Medications on File Prior to Visit Medication Sig Dispense Refill  albuterol (PROAIR HFA) 90 mcg/actuation inhaler ProAir HFA 90 mcg/actuation aerosol inhaler  albuterol (PROVENTIL VENTOLIN) 2.5 mg /3 mL (0.083 %) nebulizer solution 3 mL by Nebulization route every four (4) hours as needed for Wheezing. 24 Each 1  
 atorvastatin (LIPITOR) 10 mg tablet TAKE 1 TABLET BY MOUTH  DAILY 90 Tab 3  
 hydrALAZINE (APRESOLINE) 25 mg tablet Take 25 mg by mouth three (3) times daily.  nebivolol (BYSTOLIC) 5 mg tablet Take 1 Tab by mouth daily.  90 Tab 0  
  SITagliptin (JANUVIA) 50 mg tablet Take 1 Tab by mouth daily. 90 Tab 0  pioglitazone (ACTOS) 30 mg tablet Take 1 Tab by mouth daily. 90 Tab 0  
 furosemide (LASIX) 40 mg tablet TAKE 1 TABLET BY MOUTH  MONDAY, WEDNESDAY, AND  FRIDAY 39 Tab 0  
 glimepiride (AMARYL) 2 mg tablet Take 1 Tab by mouth daily. 90 Tab 0  
 lisinopril (PRINIVIL, ZESTRIL) 2.5 mg tablet Take 1 Tab by mouth daily. 90 Tab 0  
 tamsulosin (FLOMAX) 0.4 mg capsule TAKE 1 CAPSULE BY MOUTH  DAILY AFTER DINNER 90 Cap 2  
 albuterol (PROVENTIL HFA, VENTOLIN HFA, PROAIR HFA) 90 mcg/actuation inhaler Take 2 Puffs by inhalation every four (4) hours as needed for Wheezing. 1 Inhaler 11  
 glucose blood VI test strips (ONETOUCH ULTRA TEST) strip Test blood glucose daily. E11.9 100 Strip 3  cyanocobalamin 1,000 mcg tablet Take 1,000 mcg by mouth daily.  cholecalciferol (VITAMIN D3) 1,000 unit cap Take  by mouth daily.  vitamin E (AQUA GEMS) 400 unit capsule Take  by mouth daily.  fluticasone (FLONASE) 50 mcg/actuation nasal spray INHALE 2 SPRAYS IN EACH  NOSTRIL DAILY 16 g 3  
 sodium bicarbonate 325 mg tablet Take 1 Tab by mouth daily. 90 Tab 0  
 aspirin 81 mg chewable tablet Take 81 mg by mouth daily.  cranberry extract 450 mg tab Take  by mouth.  ascorbic acid, vitamin C, (VITAMIN C) 500 mg tablet Take 1,000 mg by mouth.  OTHER Indications: Osteo Bi-Flex 1 per day No current facility-administered medications on file prior to visit. Allergies Allergen Reactions  Norvasc [Amlodipine] Swelling Patient states NKDA Objective: 
Visit Vitals /64 (BP 1 Location: Left arm, BP Patient Position: Sitting) Pulse (!) 56 Temp 97.8 °F (36.6 °C) (Oral) Resp 18 Ht 5' 7\" (1.702 m) Wt 216 lb (98 kg) SpO2 95% BMI 33.83 kg/m² Constitutional: Well developed, nourished, no distress, alert, obese habitus CV: S1, S2.  RRR. No murmurs/rubs. tr edema. No aortic bruits. Pulm: No abnormalities on inspection. Clear to auscultation bilaterally. No wheezing/rhonchi. Normal effort. GI: Soft, nontender, nondistended. Normal active bowel sounds. Monofilament nml bilat Labwork and Ancillary Studies: CBC w/Diff Lab Results Component Value Date/Time WBC 6.7 11/05/2018 08:23 AM  
 HGB 13.5 11/05/2018 08:23 AM  
 PLATELET 632 82/33/5572 08:23 AM  
  
 
 Basic Metabolic Profile/LFTs Lab Results Component Value Date/Time Sodium 141 11/05/2018 08:23 AM  
 Potassium 4.8 11/05/2018 08:23 AM  
 Chloride 108 11/05/2018 08:23 AM  
 CO2 26 11/05/2018 08:23 AM  
 Anion gap 7 11/05/2018 08:23 AM  
 Glucose 171 (H) 11/05/2018 08:23 AM  
 BUN 26 (H) 11/05/2018 08:23 AM  
 Creatinine 1.38 (H) 11/05/2018 08:23 AM  
 BUN/Creatinine ratio 19 11/05/2018 08:23 AM  
 GFR est AA >60 11/05/2018 08:23 AM  
 GFR est non-AA 50 (L) 11/05/2018 08:23 AM  
 Calcium 9.2 11/05/2018 08:23 AM  
  
Lab Results Component Value Date/Time ALT (SGPT) 18 05/21/2018 08:30 AM  
 AST (SGOT) 14 (L) 05/21/2018 08:30 AM  
 Alk. phosphatase 68 05/21/2018 08:30 AM  
 Bilirubin, total 0.2 05/21/2018 08:30 AM  
 
 
Cholesterol Lab Results Component Value Date/Time  Cholesterol, total 124 05/21/2018 08:30 AM  
 HDL Cholesterol 46 05/21/2018 08:30 AM  
 LDL, calculated 64.4 05/21/2018 08:30 AM  
 Triglyceride 68 05/21/2018 08:30 AM  
 CHOL/HDL Ratio 2.7 05/21/2018 08:30 AM

## 2019-02-05 NOTE — PROGRESS NOTES
Pepe Fajardo is a 69 y.o. male (: 1940) presenting to address: 
 
No chief complaint on file. Vitals:  
 19 0802 BP: 112/64 Pulse: (!) 56 Resp: 18 Temp: 97.8 °F (36.6 °C) TempSrc: Oral  
SpO2: 95% Weight: 216 lb (98 kg) Height: 5' 7\" (1.702 m) PainSc:   0 - No pain Hearing/Vision: No exam data present Learning Assessment:  
 
Learning Assessment 2017 PRIMARY LEARNER Patient HIGHEST LEVEL OF EDUCATION - PRIMARY LEARNER  4 YEARS OF COLLEGE  
BARRIERS PRIMARY LEARNER NONE  
CO-LEARNER CAREGIVER No  
PRIMARY LANGUAGE ENGLISH  NEED No  
LEARNER PREFERENCE PRIMARY VIDEOS  
ANSWERED BY self RELATIONSHIP SELF Depression Screening: PHQ over the last two weeks 2019 Little interest or pleasure in doing things Not at all Feeling down, depressed, irritable, or hopeless Not at all Total Score PHQ 2 0 Fall Risk Assessment:  
 
Fall Risk Assessment, last 12 mths 2019 Able to walk? Yes Fall in past 12 months? No  
 
Abuse Screening:  
 
Abuse Screening Questionnaire 2018 Do you ever feel afraid of your partner? Tod Facundo Are you in a relationship with someone who physically or mentally threatens you? Todar Loredo Is it safe for you to go home? [facility] Coordination of Care Questionaire: 1. Have you been to the ER, urgent care clinic since your last visit? Hospitalized since your last visit? No  
2. Have you seen or consulted any other health care providers outside of the [address] [place] [address] since your last visit? Include any pap smears or colon screening. No  
 
Advanced Directive: 1. Do you have an Advanced Directive? Yes  
 
2. Would you like information on Advanced Directives? No  
 
 
Health Maintenance Due Topic Date Due  Shingrix Vaccine Age 50> (2 of 2) 2018

## 2019-02-08 DIAGNOSIS — I10 ESSENTIAL HYPERTENSION: ICD-10-CM

## 2019-02-08 DIAGNOSIS — E11.9 CONTROLLED TYPE 2 DIABETES MELLITUS WITHOUT COMPLICATION, WITHOUT LONG-TERM CURRENT USE OF INSULIN (HCC): ICD-10-CM

## 2019-02-08 DIAGNOSIS — E11.21 DIABETIC NEPHROPATHY ASSOCIATED WITH TYPE 2 DIABETES MELLITUS (HCC): ICD-10-CM

## 2019-02-08 RX ORDER — NEBIVOLOL 5 MG/1
5 TABLET ORAL DAILY
Qty: 90 TAB | Refills: 1 | Status: SHIPPED | OUTPATIENT
Start: 2019-02-08 | End: 2019-09-04 | Stop reason: SDUPTHER

## 2019-02-08 RX ORDER — LISINOPRIL 2.5 MG/1
2.5 TABLET ORAL DAILY
Qty: 90 TAB | Refills: 1 | Status: SHIPPED | OUTPATIENT
Start: 2019-02-08 | End: 2019-03-27 | Stop reason: ALTCHOICE

## 2019-02-08 RX ORDER — PIOGLITAZONEHYDROCHLORIDE 30 MG/1
30 TABLET ORAL DAILY
Qty: 90 TAB | Refills: 0 | Status: SHIPPED | OUTPATIENT
Start: 2019-02-08 | End: 2019-03-29 | Stop reason: SDUPTHER

## 2019-02-08 RX ORDER — GLIMEPIRIDE 2 MG/1
4 TABLET ORAL DAILY
Qty: 180 TAB | Refills: 0 | Status: SHIPPED | OUTPATIENT
Start: 2019-02-08 | End: 2019-03-29 | Stop reason: SDUPTHER

## 2019-02-08 RX ORDER — TRIAMCINOLONE ACETONIDE 1 MG/G
CREAM TOPICAL 2 TIMES DAILY
Qty: 454 G | Refills: 3 | Status: SHIPPED | OUTPATIENT
Start: 2019-02-08 | End: 2019-09-04 | Stop reason: SDUPTHER

## 2019-02-08 RX ORDER — FUROSEMIDE 40 MG/1
TABLET ORAL
Qty: 39 TAB | Refills: 1 | Status: SHIPPED | OUTPATIENT
Start: 2019-02-08 | End: 2019-05-03 | Stop reason: SDUPTHER

## 2019-02-08 NOTE — TELEPHONE ENCOUNTER
Pt's wife called stating that they told Dr Kalpana Cardoza to send his meds to [facility] by mistake. She is wanting his meds that were sent on 2.5.19 to go to [facility]. She is going to go call [facility] to cxl the rx.

## 2019-03-27 RX ORDER — HYDRALAZINE HYDROCHLORIDE 50 MG/1
50 TABLET, FILM COATED ORAL 3 TIMES DAILY
Qty: 270 TAB | Refills: 1 | Status: SHIPPED | OUTPATIENT
Start: 2019-03-27 | End: 2019-05-03 | Stop reason: SDUPTHER

## 2019-05-01 ENCOUNTER — HOSPITAL ENCOUNTER (OUTPATIENT)
Dept: LAB | Age: 79
Discharge: HOME | End: 2019-05-01
Payer: COMMERCIAL

## 2019-05-01 DIAGNOSIS — I10 ESSENTIAL HYPERTENSION: ICD-10-CM

## 2019-05-01 DIAGNOSIS — R97.20 ELEVATED PSA: ICD-10-CM

## 2019-05-01 DIAGNOSIS — E11.9 CONTROLLED TYPE 2 DIABETES MELLITUS WITHOUT COMPLICATION, WITHOUT LONG-TERM CURRENT USE OF INSULIN (HCC): ICD-10-CM

## 2019-05-01 LAB
ANION GAP SERPL CALC-SCNC: 11 MMOL/L (ref 3–18)
BUN SERPL-MCNC: 48 MG/DL (ref 7–18)
BUN/CREAT SERPL: 23 (ref 12–20)
CALCIUM SERPL-MCNC: 9.2 MG/DL (ref 8.5–10.1)
CHLORIDE SERPL-SCNC: 106 MMOL/L (ref 100–108)
CHOLEST SERPL-MCNC: 132 MG/DL
CO2 SERPL-SCNC: 24 MMOL/L (ref 21–32)
CREAT SERPL-MCNC: 2.12 MG/DL (ref 0.6–1.3)
GLUCOSE SERPL-MCNC: 148 MG/DL (ref 74–99)
HBA1C MFR BLD: 7.9 % (ref 4.2–5.6)
HDLC SERPL-MCNC: 47 MG/DL (ref 40–60)
HDLC SERPL: 2.8 {RATIO} (ref 0–5)
LDLC SERPL CALC-MCNC: 68 MG/DL (ref 0–100)
LIPID PROFILE,FLP: NORMAL
POTASSIUM SERPL-SCNC: 4.3 MMOL/L (ref 3.5–5.5)
SODIUM SERPL-SCNC: 141 MMOL/L (ref 136–145)
TRIGL SERPL-MCNC: 85 MG/DL (ref ?–150)
VLDLC SERPL CALC-MCNC: 17 MG/DL

## 2019-05-01 PROCEDURE — 84154 ASSAY OF PSA FREE: CPT

## 2019-05-01 PROCEDURE — 84153 ASSAY OF PSA TOTAL: CPT

## 2019-05-01 PROCEDURE — 83036 HEMOGLOBIN GLYCOSYLATED A1C: CPT

## 2019-05-01 PROCEDURE — 80048 BASIC METABOLIC PNL TOTAL CA: CPT

## 2019-05-01 PROCEDURE — 36415 COLL VENOUS BLD VENIPUNCTURE: CPT

## 2019-05-01 PROCEDURE — 80061 LIPID PANEL: CPT

## 2019-05-02 LAB
% FREE PSA, 480797: 45.8 %
PSA SERPL-MCNC: 5 NG/ML (ref 0–4)
PSA, FREE, 480853: 2.29 NG/ML
REFLEX CRITERIA: ABNORMAL

## 2019-05-03 DIAGNOSIS — E11.9 CONTROLLED TYPE 2 DIABETES MELLITUS WITHOUT COMPLICATION, WITHOUT LONG-TERM CURRENT USE OF INSULIN (HCC): ICD-10-CM

## 2019-05-03 RX ORDER — FUROSEMIDE 40 MG/1
TABLET ORAL
Qty: 39 TAB | Refills: 1 | Status: SHIPPED | OUTPATIENT
Start: 2019-05-03 | End: 2019-07-12 | Stop reason: SDUPTHER

## 2019-05-03 RX ORDER — PIOGLITAZONEHYDROCHLORIDE 30 MG/1
TABLET ORAL
Qty: 90 TAB | Refills: 0 | Status: SHIPPED | OUTPATIENT
Start: 2019-05-03 | End: 2019-05-07 | Stop reason: ALTCHOICE

## 2019-05-03 RX ORDER — HYDRALAZINE HYDROCHLORIDE 50 MG/1
50 TABLET, FILM COATED ORAL 3 TIMES DAILY
Qty: 270 TAB | Refills: 1 | Status: SHIPPED | OUTPATIENT
Start: 2019-05-03 | End: 2019-05-07

## 2019-05-03 RX ORDER — TAMSULOSIN HYDROCHLORIDE 0.4 MG/1
CAPSULE ORAL
Qty: 90 CAP | Refills: 3 | Status: SHIPPED | OUTPATIENT
Start: 2019-05-03 | End: 2019-07-12 | Stop reason: SDUPTHER

## 2019-05-03 NOTE — TELEPHONE ENCOUNTER
Requested Prescriptions     Pending Prescriptions Disp Refills    tamsulosin (FLOMAX) 0.4 mg capsule 90 Cap 2    pioglitazone (ACTOS) 30 mg tablet 90 Tab 0    furosemide (LASIX) 40 mg tablet 39 Tab 1     Sig: TAKE 1 TABLET BY MOUTH  MONDAY, WEDNESDAY, AND  FRIDAY    hydrALAZINE (APRESOLINE) 50 mg tablet 270 Tab 1     Sig: Take 1 Tab by mouth three (3) times daily. Patient's wife calling to request refill on:      Remaining pills: 1 week left   Last appt: 2.5.19  Next appt: 5.7.19    Pharmacy: [facility]       Patient aware of 72 hour time frame. Pt's wife did state that there are changed to 2 of the meds the furosemide she states that he is supposed to be taking it once ever day. And the hydralazine has been increased to 6 times a day. She is also requesting a 90 days supply for her medication.  Please advise

## 2019-05-07 ENCOUNTER — OFFICE VISIT (OUTPATIENT)
Dept: FAMILY MEDICINE CLINIC | Age: 79
End: 2019-05-07

## 2019-05-07 VITALS
TEMPERATURE: 97.9 F | WEIGHT: 226 LBS | HEART RATE: 55 BPM | SYSTOLIC BLOOD PRESSURE: 148 MMHG | BODY MASS INDEX: 35.47 KG/M2 | HEIGHT: 67 IN | OXYGEN SATURATION: 94 % | RESPIRATION RATE: 18 BRPM | DIASTOLIC BLOOD PRESSURE: 70 MMHG

## 2019-05-07 DIAGNOSIS — I10 ESSENTIAL HYPERTENSION: ICD-10-CM

## 2019-05-07 DIAGNOSIS — E11.22 TYPE 2 DIABETES MELLITUS WITH STAGE 3 CHRONIC KIDNEY DISEASE, WITHOUT LONG-TERM CURRENT USE OF INSULIN (HCC): Primary | ICD-10-CM

## 2019-05-07 DIAGNOSIS — Z00.00 MEDICARE ANNUAL WELLNESS VISIT, SUBSEQUENT: ICD-10-CM

## 2019-05-07 DIAGNOSIS — E66.01 SEVERE OBESITY (HCC): ICD-10-CM

## 2019-05-07 DIAGNOSIS — M65.332 TRIGGER FINGER, LEFT MIDDLE FINGER: ICD-10-CM

## 2019-05-07 DIAGNOSIS — N18.30 TYPE 2 DIABETES MELLITUS WITH STAGE 3 CHRONIC KIDNEY DISEASE, WITHOUT LONG-TERM CURRENT USE OF INSULIN (HCC): Primary | ICD-10-CM

## 2019-05-07 DIAGNOSIS — R97.20 ELEVATED PSA: ICD-10-CM

## 2019-05-07 DIAGNOSIS — N18.30 CKD (CHRONIC KIDNEY DISEASE) STAGE 3, GFR 30-59 ML/MIN (HCC): ICD-10-CM

## 2019-05-07 LAB
ALBUMIN UR QL STRIP: 150 MG/L
CREATININE, URINE POC: 50 MG/DL
MICROALBUMIN/CREAT RATIO POC: >300 MG/G

## 2019-05-07 RX ORDER — HYDRALAZINE HYDROCHLORIDE 100 MG/1
100 TABLET, FILM COATED ORAL 3 TIMES DAILY
Qty: 270 TAB | Refills: 0 | Status: SHIPPED | OUTPATIENT
Start: 2019-05-07 | End: 2019-06-25 | Stop reason: SDUPTHER

## 2019-05-07 RX ORDER — PEN NEEDLE, DIABETIC 30 GX3/16"
NEEDLE, DISPOSABLE MISCELLANEOUS
Qty: 100 PACKAGE | Refills: 3 | Status: SHIPPED | OUTPATIENT
Start: 2019-05-07 | End: 2020-03-11

## 2019-05-07 RX ORDER — INSULIN GLARGINE 100 [IU]/ML
20 INJECTION, SOLUTION SUBCUTANEOUS DAILY
Qty: 10 PEN | Refills: 0 | Status: SHIPPED | OUTPATIENT
Start: 2019-05-07 | End: 2019-06-21 | Stop reason: SDUPTHER

## 2019-05-07 NOTE — PATIENT INSTRUCTIONS
Medicare Wellness Visit, Male The best way to live healthy is to have a lifestyle where you eat a well-balanced diet, exercise regularly, limit alcohol use, and quit all forms of tobacco/nicotine, if applicable. Regular preventive services are another way to keep healthy. Preventive services (vaccines, screening tests, monitoring & exams) can help personalize your care plan, which helps you manage your own care. Screening tests can find health problems at the earliest stages, when they are easiest to treat. [facility] follows the current, evidence-based guidelines published by the [facility] Dor[facility] ([facility]) when recommending preventive services for our patients. Because we follow these guidelines, sometimes recommendations change over time as research supports it. (For example, a prostate screening blood test is no longer routinely recommended for men with no symptoms.) Of course, you and your doctor may decide to screen more often for some diseases, based on your risk and co-morbidities (chronic disease you are already diagnosed with). Preventive services for you include: - Medicare offers their members a free annual wellness visit, which is time for you and your primary care provider to discuss and plan for your preventive service needs. Take advantage of this benefit every year! 
-All adults over age 72 should receive the recommended pneumonia vaccines. Current USPSTF guidelines recommend a series of two vaccines for the best pneumonia protection.  
-All adults should have a flu vaccine yearly and an ECG.  All adults age 61 and older should receive a shingles vaccine once in their lifetime.   
-All adults age 38-68 who are overweight should have a diabetes screening test once every three years.  
-Other screening tests & preventive services for persons with diabetes include: an eye exam to screen for diabetic retinopathy, a kidney function test, a foot exam, and stricter control over your cholesterol.  
-Cardiovascular screening for adults with routine risk involves an electrocardiogram (ECG) at intervals determined by the provider.  
-Colorectal cancer screening should be done for adults age 54-65 with no increased risk factors for colorectal cancer. There are a number of acceptable methods of screening for this type of cancer. Each test has its own benefits and drawbacks. Discuss with your provider what is most appropriate for you during your annual wellness visit. The different tests include: colonoscopy (considered the best screening method), a fecal occult blood test, a fecal DNA test, and sigmoidoscopy. 
-All adults born between [facility] should be screened once for Hepatitis C. 
-An Abdominal Aortic Aneurysm (AAA) Screening is recommended for men age 73-68 who has ever smoked in their lifetime. Here is a list of your current Health Maintenance items (your personalized list of preventive services) with a due date: 
Health Maintenance Due Topic Date Due  
 Albumin Urine Test  05/21/2019 Jovan Annual Well Visit  05/22/2019

## 2019-05-07 NOTE — PROGRESS NOTES
Assessment/Plan: 1. Type 2 diabetes mellitus with stage 3 chronic kidney disease, without long-term current use of insulin ([facility]) 
-worsening a1c. Having edema. Will d/c amaryl and actos and add lantus. Need to monitor gfr (currently 30). If drops<30, will need to drop dose Georgetta Look. F/u in1 mo with bs log - AMB POC URINE, MICROALBUMIN, SEMIQUANT (3 RESULTS) 
- insulin glargine (LANTUS,BASAGLAR) 100 unit/mL (3 mL) inpn; 20 Units by SubCUTAneous route daily. Dispense: 10 Pen; Refill: 0 
- SITagliptin (JANUVIA) 50 mg tablet; TAKE 1 TABLET BY MOUTH  DAILY  Dispense: 90 Tab; Refill: 0 
- Insulin Needles, Disposable, 31 gauge x 5/16\" ndle; Use daily with lantus. e11.65  Dispense: 100 Package; Refill: 3 
 
2. Trigger finger, left middle finger 
- REFERRAL TO HAND SURGERY 3. Essential hypertension 
-encouraged to take with meals to help with remembering. If still high, will add clonidine. 
- hydrALAZINE (APRESOLINE) 100 mg tablet; Take 1 Tab by mouth three (3) times daily. Dispense: 270 Tab; Refill: 0 
 
4. Elevated PSA 
-stable. %free psa reassuring. 5. CKD (chronic kidney disease) stage 3, GFR 30-59 ml/min ([facility]) -followed by renal. 
 
6. Medicare annual wellness visit, subsequent 7. Severe obesity ([address] 
-discussed increasing cardio exercise to 40min/day and eating 25% less at each meal 
 
 
The plan was discussed with the patient. The patient verbalized understanding and is in agreement with the plan. All medication potential side effects were discussed with the patient. Health Maintenance:  
Health Maintenance Topic Date Due  
 MICROALBUMIN Q1  05/21/2019  Influenza Age 5 to Adult  08/01/2019  
 HEMOGLOBIN A1C Q6M  11/01/2019  GLAUCOMA SCREENING Q2Y  01/18/2020  
 EYE EXAM RETINAL OR DILATED  01/18/2020  
 FOOT EXAM Q1  02/05/2020  LIPID PANEL Q1  05/01/2020  MEDICARE YEARLY EXAM  05/07/2020  
 DTaP/Tdap/Td series (2 - Td) 05/19/2027  Shingrix Vaccine Age 50>  Completed  Pneumococcal 65+ years  Completed Susana Fontanez is a 69 y.o. male and presents with Physical 
  
Subjective: HTN - lisinopril was d/c'd by renal.  bp was running high. He increased hydralazine to 100mg tid. Sometimes he forgets to take it. dm2 - a1c is 7.9. On actos and having swelling and wt gain. No dyspnea. He c/o L middle and 4th finger locking up and having difficulty extending. Obesity - exercises 20min x 7d/week. Diet is \"healthy\". ROS: 
Constitutional: + recent weight change. No weakness/fatigue. No f/c. Skin: No rashes, change in nails/hair, itching HENT: No HA, dizziness. No hearing loss/tinnitus. No nasal congestion/discharge. Eyes: No change in vision, double/blurred vision or eye pain/redness. Cardiovascular: No CP/palpitations. No TORO/orthopnea/PND. Respiratory: No cough/sputum, dyspnea, wheezing. Gastointestinal: No dysphagia, reflux. No n/v. No constipation/diarrhea. No melena/rectal bleeding. Genitourinary: No dysuria, urinary hesitancy, nocturia, hematuria. No incontinence. Musculoskeletal: No joint pain/stiffness. No muscle pain/tenderness. Endo: No heat/cold intolerance, no polyuria/polydypsia. Heme: No h/o anemia. No easy bleeding/bruising. Allergy/Immunology: No seasonal rhinitis. Denies frequent colds, sinus/ear infections. Neurological: No seizures/numbness/weakness. No paresthesias. Psychiatric:  No depression, anxiety. The problem list was updated as a part of today's visit. Patient Active Problem List  
Diagnosis Code  Controlled type 2 diabetes mellitus without complication, without long-term current use of insulin ([facility]) E11.9  CKD (chronic kidney disease) stage 3, GFR 30-59 ml/min ([facility]) N18.3  Essential hypertension I10  Elevated PSA R97.20  Benign prostatic hyperplasia with lower urinary tract symptoms N40.1  B12 deficiency E53.8  Diabetic nephropathy associated with type 2 diabetes mellitus (HCC) E11.21  
 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 cholecystitis K80.21  
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 SH: Social History Tobacco Use  Smoking status: Never Smoker  Smokeless tobacco: Never Used Substance Use Topics  Alcohol use: No  
 Drug use: No  
 
 
Medications/Allergies: 
Current Outpatient Medications on File Prior to Visit Medication Sig Dispense Refill  tamsulosin (FLOMAX) 0.4 mg capsule TAKE 1 CAPSULE BY MOUTH  DAILY AFTER DINNER 90 Cap 3  pioglitazone (ACTOS) 30 mg tablet TAKE 1 TABLET BY MOUTH  DAILY 90 Tab 0  
 furosemide (LASIX) 40 mg tablet TAKE 1 TABLET BY MOUTH  MONDAY, WEDNESDAY, AND  FRIDAY 39 Tab 1  
 hydrALAZINE (APRESOLINE) 50 mg tablet Take 1 Tab by mouth three (3) times daily. 270 Tab 1  
 glimepiride (AMARYL) 2 mg tablet Take 1 Tab by mouth every morning. 90 Tab 1  JANUVIA 50 mg tablet TAKE 1 TABLET BY MOUTH  DAILY 90 Tab 0  
 nebivolol (BYSTOLIC) 5 mg tablet Take 1 Tab by mouth daily. 90 Tab 1  
 triamcinolone acetonide (KENALOG) 0.1 % topical cream Apply  to affected area two (2) times a day. use thin layer 454 g 3  
 albuterol (PROAIR HFA) 90 mcg/actuation inhaler ProAir HFA 90 mcg/actuation aerosol inhaler  albuterol (PROVENTIL VENTOLIN) 2.5 mg /3 mL (0.083 %) nebulizer solution 3 mL by Nebulization route every four (4) hours as needed for Wheezing. 24 Each 1  
 atorvastatin (LIPITOR) 10 mg tablet TAKE 1 TABLET BY MOUTH  DAILY 90 Tab 3  
 albuterol (PROVENTIL HFA, VENTOLIN HFA, PROAIR HFA) 90 mcg/actuation inhaler Take 2 Puffs by inhalation every four (4) hours as needed for Wheezing. 1 Inhaler 11  
 glucose blood VI test strips (ONETOUCH ULTRA TEST) strip Test blood glucose daily. E11.9 100 Strip 3  cyanocobalamin 1,000 mcg tablet Take 1,000 mcg by mouth daily.  cholecalciferol (VITAMIN D3) 1,000 unit cap Take  by mouth daily.  vitamin E (AQUA GEMS) 400 unit capsule Take  by mouth daily.  fluticasone (FLONASE) 50 mcg/actuation nasal spray INHALE 2 SPRAYS IN EACH  NOSTRIL DAILY 16 g 3  
 sodium bicarbonate 325 mg tablet Take 1 Tab by mouth daily. 90 Tab 0  
 aspirin 81 mg chewable tablet Take 81 mg by mouth daily.  cranberry extract 450 mg tab Take  by mouth.  ascorbic acid, vitamin C, (VITAMIN C) 500 mg tablet Take 1,000 mg by mouth.  OTHER Indications: Osteo Bi-Flex 1 per day No current facility-administered medications on file prior to visit. Allergies Allergen Reactions  Norvasc [Amlodipine] Swelling Patient states NKDA Objective: 
Visit Vitals /70 (BP 1 Location: Left arm, BP Patient Position: Sitting) Pulse (!) 55 Temp 97.9 °F (36.6 °C) (Oral) Resp 18 Ht 5' 7\" (1.702 m) Wt 226 lb (102.5 kg) SpO2 94% BMI 35.40 kg/m² Constitutional: Well developed, nourished, no distress, alert, obese habitus CV: S1, S2.  RRR. No murmurs/rubs. 2+ pitting edema. No aortic bruits. Pulm: No abnormalities on inspection. Clear to auscultation bilaterally. No wheezing/rhonchi. Normal effort. GI: Soft, nontender, nondistended. Normal active bowel sounds. MS: Gait normal.  Joints without deformity/tenderness. Neuro: A/O x 3. No focal motor or sensory deficits. Speech normal.  
Skin: No lesions/rashes on inspection. Psych: Appropriate affect, judgement and insight. Short-term memory intact. Labwork and Ancillary Studies: CBC w/Diff Lab Results Component Value Date/Time WBC 6.7 11/05/2018 08:23 AM  
 HGB 13.5 11/05/2018 08:23 AM  
 PLATELET 265 20/80/5124 08:23 AM  
  
 
 Basic Metabolic Profile/LFTs Lab Results Component Value Date/Time  Sodium 141 05/01/2019 07:51 AM  
 Potassium 4.3 05/01/2019 07:51 AM  
 Chloride 106 05/01/2019 07:51 AM  
 CO2 24 05/01/2019 07:51 AM  
 Anion gap 11 05/01/2019 07:51 AM  
 Glucose 148 (H) 05/01/2019 07:51 AM  
 BUN 48 (H) 05/01/2019 07:51 AM  
 Creatinine 2.12 (H) 05/01/2019 07:51 AM  
 BUN/Creatinine ratio 23 (H) 05/01/2019 07:51 AM  
 GFR est AA 37 (L) 05/01/2019 07:51 AM  
 GFR est non-AA 30 (L) 05/01/2019 07:51 AM  
 Calcium 9.2 05/01/2019 07:51 AM  
  
Lab Results Component Value Date/Time ALT (SGPT) 18 05/21/2018 08:30 AM  
 AST (SGOT) 14 (L) 05/21/2018 08:30 AM  
 Alk. phosphatase 68 05/21/2018 08:30 AM  
 Bilirubin, total 0.2 05/21/2018 08:30 AM  
 
 
Cholesterol Lab Results Component Value Date/Time Cholesterol, total 132 05/01/2019 07:51 AM  
 HDL Cholesterol 47 05/01/2019 07:51 AM  
 LDL, calculated 68 05/01/2019 07:51 AM  
 Triglyceride 85 05/01/2019 07:51 AM  
 CHOL/HDL Ratio 2.8 05/01/2019 07:51 AM  
 
 
 
This is the Subsequent Medicare Annual Wellness Exam, performed 12 months or more after the Initial AWV or the last Subsequent AWV I have reviewed the patient's medical history in detail and updated the computerized patient record. History Past Medical History:  
Diagnosis Date  BPH with obstruction/lower urinary tract symptoms  Calculus of kidney  Chronic kidney disease  Diabetes ([address]  Elevated PSA  Hypercholesterolemia  Hypertension Past Surgical History:  
Procedure Laterality Date  HX APPENDECTOMY 16years old  HX COLONOSCOPY  09/10/2014  HX ORTHOPAEDIC  1970s  
 r shoulder  HX UROLOGICAL  02/06/2017 Prostate Bx: HGPIN left lateral mid. Dr. Nikhil Rivas @ [place] in [facility]. Current Outpatient Medications Medication Sig Dispense Refill  tamsulosin (FLOMAX) 0.4 mg capsule TAKE 1 CAPSULE BY MOUTH  DAILY AFTER DINNER 90 Cap 3  pioglitazone (ACTOS) 30 mg tablet TAKE 1 TABLET BY MOUTH  DAILY 90 Tab 0  
  furosemide (LASIX) 40 mg tablet TAKE 1 TABLET BY MOUTH  MONDAY, WEDNESDAY, AND  FRIDAY 39 Tab 1  
 hydrALAZINE (APRESOLINE) 50 mg tablet Take 1 Tab by mouth three (3) times daily. 270 Tab 1  
 glimepiride (AMARYL) 2 mg tablet Take 1 Tab by mouth every morning. 90 Tab 1  JANUVIA 50 mg tablet TAKE 1 TABLET BY MOUTH  DAILY 90 Tab 0  
 nebivolol (BYSTOLIC) 5 mg tablet Take 1 Tab by mouth daily. 90 Tab 1  
 triamcinolone acetonide (KENALOG) 0.1 % topical cream Apply  to affected area two (2) times a day. use thin layer 454 g 3  
 albuterol (PROAIR HFA) 90 mcg/actuation inhaler ProAir HFA 90 mcg/actuation aerosol inhaler  albuterol (PROVENTIL VENTOLIN) 2.5 mg /3 mL (0.083 %) nebulizer solution 3 mL by Nebulization route every four (4) hours as needed for Wheezing. 24 Each 1  
 atorvastatin (LIPITOR) 10 mg tablet TAKE 1 TABLET BY MOUTH  DAILY 90 Tab 3  
 albuterol (PROVENTIL HFA, VENTOLIN HFA, PROAIR HFA) 90 mcg/actuation inhaler Take 2 Puffs by inhalation every four (4) hours as needed for Wheezing. 1 Inhaler 11  
 glucose blood VI test strips (ONETOUCH ULTRA TEST) strip Test blood glucose daily. E11.9 100 Strip 3  cyanocobalamin 1,000 mcg tablet Take 1,000 mcg by mouth daily.  cholecalciferol (VITAMIN D3) 1,000 unit cap Take  by mouth daily.  vitamin E (AQUA GEMS) 400 unit capsule Take  by mouth daily.  fluticasone (FLONASE) 50 mcg/actuation nasal spray INHALE 2 SPRAYS IN EACH  NOSTRIL DAILY 16 g 3  
 sodium bicarbonate 325 mg tablet Take 1 Tab by mouth daily. 90 Tab 0  
 aspirin 81 mg chewable tablet Take 81 mg by mouth daily.  cranberry extract 450 mg tab Take  by mouth.  ascorbic acid, vitamin C, (VITAMIN C) 500 mg tablet Take 1,000 mg by mouth.  OTHER Indications: Osteo Bi-Flex 1 per day Allergies Allergen Reactions  Norvasc [Amlodipine] Swelling Patient states NKDA Family History Problem Relation Age of Onset  Kidney Disease Mother  Stroke Father Social History Tobacco Use  Smoking status: Never Smoker  Smokeless tobacco: Never Used Substance Use Topics  Alcohol use: No  
 
Patient Active Problem List  
Diagnosis Code  Controlled type 2 diabetes mellitus without complication, without long-term current use of insulin ([facility]) E11.9  CKD (chronic kidney disease) stage 3, GFR 30-59 ml/min ([facility]) N18.3  Essential hypertension I10  Elevated PSA R97.20  Benign prostatic hyperplasia with lower urinary tract symptoms N40.1  B12 deficiency E53.8  Diabetic nephropathy associated with type 2 diabetes mellitus ([facility]) E11.21  
 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 cholecystitis K80.21  
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  
 
3 most recent PHQ Screens 5/7/2019 Little interest or pleasure in doing things Not at all Feeling down, depressed, irritable, or hopeless Not at all Total Score PHQ 2 0 Alcohol Risk Factor Screening: You do not drink alcohol or very rarely. Functional Ability and Level of Safety:  
Hearing Loss Hearing is good. Activities of Daily Living The home contains: no safety equipment. Patient does total self care Fall Risk Fall Risk Assessment, last 12 mths 5/7/2019 Able to walk? Yes Fall in past 12 months? No  
 
 
Abuse Screen Patient is not abused Cognitive Screening Evaluation of Cognitive Function: 
Has your family/caregiver stated any concerns about your memory: no 
Normal 
 
Patient Care Team  
Patient Care Team: 
Lonnie Rico MD as PCP - General (Internal Medicine) Sofía Marshall MD (Nephrology) Nasreen Gray MD (Urology) Luz Maria Kelley NP (Nurse Practitioner) Ashkan Orr MD (Ophthalmology) Assessment/Plan Education and counseling provided: 
Are appropriate based on today's review and evaluation End-of-Life planning (with patient's consent) Diagnoses and all orders for this visit: 
 
1. Type 2 diabetes mellitus with stage 3 chronic kidney disease, without long-term current use of insulin ([address] -     AMB POC URINE, MICROALBUMIN, SEMIQUANT (3 RESULTS) 
-     insulin glargine (LANTUS,BASAGLAR) 100 unit/mL (3 mL) inpn; 20 Units by SubCUTAneous route daily. 
-     SITagliptin (JANUVIA) 50 mg tablet; TAKE 1 TABLET BY MOUTH  DAILY 
-     Insulin Needles, Disposable, 31 gauge x 5/16\" ndle; Use daily with lantus. e11.65 2. Trigger finger, left middle finger 
-     REFERRAL TO HAND SURGERY 3. Essential hypertension 
-     hydrALAZINE (APRESOLINE) 100 mg tablet; Take 1 Tab by mouth three (3) times daily. 4. Elevated PSA 5. CKD (chronic kidney disease) stage 3, GFR 30-59 ml/min ([facility]) 6. Medicare annual wellness visit, subsequent 7. Severe obesity ([address] Health Maintenance Due Topic Date Due  
 MICROALBUMIN Q1  05/21/2019

## 2019-05-07 NOTE — PROGRESS NOTES
Demarcus Hancock is a 69 y.o. male (: 1940) presenting to address: Chief Complaint Patient presents with  Physical  
 
 
Vitals:  
 19 0920 BP: 148/70 Pulse: (!) 55 Resp: 18 Temp: 97.9 °F (36.6 °C) TempSrc: Oral  
SpO2: 94% Weight: 226 lb (102.5 kg) Height: 5' 7\" (1.702 m) PainSc:   6 PainLoc: Finger Hearing/Vision:  
 
 Visual Acuity Screening Right eye Left eye Both eyes Without correction:     
With correction: 20/25 20/25 20/20-1 Learning Assessment:  
 
Learning Assessment 2017 PRIMARY LEARNER Patient HIGHEST LEVEL OF EDUCATION - PRIMARY LEARNER  4 YEARS OF COLLEGE  
BARRIERS PRIMARY LEARNER NONE  
CO-LEARNER CAREGIVER No  
PRIMARY LANGUAGE ENGLISH  NEED No  
LEARNER PREFERENCE PRIMARY VIDEOS  
ANSWERED BY self RELATIONSHIP SELF Depression Screening:  
 
3 most recent PHQ Screens 2019 Little interest or pleasure in doing things Not at all Feeling down, depressed, irritable, or hopeless Not at all Total Score PHQ 2 0 Fall Risk Assessment:  
 
Fall Risk Assessment, last 12 mths 2019 Able to walk? Yes Fall in past 12 months? No  
 
Abuse Screening:  
 
Abuse Screening Questionnaire 2019 Do you ever feel afraid of your partner? Sherman Poles Are you in a relationship with someone who physically or mentally threatens you? [place] Poles Is it safe for you to go home? Bonnie Contreras Coordination of Care Questionaire: 1. Have you been to the ER, urgent care clinic since your last visit? Hospitalized since your last visit? NO 
 
2. Have you seen or consulted any other health care providers outside of the [address] [place] [address] since your last visit? Include any pap smears or colon screening. YES gastroenterology, nephrology, dermatolgoy Advanced Directive: 1. Do you have an Advanced Directive? YES 
 
2. Would you like information on Advanced Directives?  NO

## 2019-05-08 NOTE — PATIENT DISCUSSION
CATARACTS, OU: VISUALLY SIGNIFICANT. OPTION OF SURGERY VERSUS FOLLOWING VERSUS UPDATING GLASSES DISCUSSED. RBA'S DISCUSSED, PATIENT UNDERSTANDS AND DESIRES SURGERY TO INCREASE VISION FOR READING. SCHEDULE CATARACT SURGERY/PRE-OP OU. DISCUSSED SOFT MONOVISION IOL OPTION TO MIMIC HIS VISION NOW.

## 2019-05-28 ENCOUNTER — TELEPHONE (OUTPATIENT)
Dept: FAMILY MEDICINE CLINIC | Age: 79
End: 2019-05-28

## 2019-05-28 ENCOUNTER — PATIENT OUTREACH (OUTPATIENT)
Dept: FAMILY MEDICINE CLINIC | Age: 79
End: 2019-05-28

## 2019-05-28 NOTE — TELEPHONE ENCOUNTER
Please call pt. i started him on lantus and he doesn't know how to inject. Wants teaching.   Can you bring him into office for teaching

## 2019-05-28 NOTE — PROGRESS NOTES
Patient scheduled to meet with Nurse Navigator for diabetic teaching for use of Lantuss Pen  As requested by Dr Jaz Dee. On 5/29/2019 @ 11:30 am. Instructed to bring Supplies: Lantuss Pen and   Needle. Patient has had supplies since last office visit 5/7/2019 see Progress note from Dr Jaz Dee Office visit :   Progress Notes   Assessment/Plan:    1. Type 2 diabetes mellitus with stage 3 chronic kidney disease, without long-term current use of insulin (HCC)  -worsening a1c. Having edema. Will d/c amaryl and actos and add lantus. Need to monitor gfr (currently 30). If drops<30, will need to drop dose [place] [facility] [place]. F/u in1 mo with bs log  - AMB POC URINE, MICROALBUMIN, SEMIQUANT (3 RESULTS)  - insulin glargine (LANTUS,BASAGLAR) 100 unit/mL (3 mL) inpn; 20 Units by SubCUTAneous route daily. Dispense: 10 Pen; Refill: 0  - SITagliptin (JANUVIA) 50 mg tablet; TAKE 1 TABLET BY MOUTH  DAILY  Dispense: 90 Tab; Refill: 0  - Insulin Needles, Disposable, 31 gauge x 5/16\" ndle; Use daily with lantus. e11.65  Dispense: 100 Package;  Refill: 3

## 2019-05-29 ENCOUNTER — PATIENT OUTREACH (OUTPATIENT)
Dept: FAMILY MEDICINE CLINIC | Age: 79
End: 2019-05-29

## 2019-05-29 NOTE — PROGRESS NOTES
Starting insulin ( Lantuss 20 units ) use. Patient brought in his :  Lantus Insulin  pre-filled syringe  Needles for pre-filled syringe      Checking sugars   It is very important to check your blood sugars at least twice daily when starting insulin since you have a risk of hypoglycemia (low blood sugar) which is life-threatening. A blood sugar less than 70 is too low. The Lantus insulin is a long acting 24 hour insulin. You may not experience as many low blood sugar spikes. NN will call you next Wednesday 6/5/2019 for follow up. Patient has been ordered insulin since 5/7/2019 but just received from his Home medication delivery service. NN change his office f/u visit to reflect the Lantus dose time frame for 6/21/2019. Patient had questions concerning insulin and his colonoscopy procedure prep. NN read prep, notice it said to call his PCP concerning his insulin dose. NN said that since he is taking it at bedtime it should not interfere with his procedure. Please note:    Starting insulin at a low dose and increasing it slowly will make hypoglycemia much less likely   You must eat regular small meals while taking insulin since skipping meals will cause your sugar to drop      1. Start 20 units of Lantus insulin at night. It will last 24 hours. 2. Check glucoses first thing in AM and before dinner and write down the numbers in a notebook/paper    3. Important to eat a bedtime snack. To help with suggestions as to what to eat sample 14 days   menus given with diabetic recipes was given . 4. Other diabetic material given for diet low sodium,low cholesterol and sugar. Along with reading food labels  5. Information for foot care also reviewed and given. 6. S&S of hypo and hyperglycemia given and reviewed. 7. NN will do a current medication list for patient    Please call my office directly  if there are any concerns.    We do have a doctor on call on nights and weekends if you need to speak with a provider [phone] or call [phone]

## 2019-05-29 NOTE — Clinical Note
Patient came for Alysha education. Had his Prep for his up coming colonoscopy and wanted to know what meds to take. He was not clear on his oral meds which to stop as well. It's cleared up now. He is back to exercising 45 minutes 5 times a week as well. I told him to chk his blood sugar fasting and dinner but at least once a day. We changed his return f/u because he just got his insulin and it would not have been a month. Thanks

## 2019-06-03 ENCOUNTER — PATIENT OUTREACH (OUTPATIENT)
Dept: FAMILY MEDICINE CLINIC | Age: 79
End: 2019-06-03

## 2019-06-03 NOTE — PROGRESS NOTES
Patient called NN prior to set  Weds call, concern about his snacks. Blood sugar was 180 Thursday night reading did not take a snack but am fasting blood sugar for Friday morning was 112. Should he dominic his snack by his blood glucose prior to taking his insulin? . NN said his insulin is a long acting one , but it can peak and make the blood sugar drop during the night, but if he is concern to eat a big dinner if blood sugars at around 160 and above no snack , but if am blood sugars drops below 70 he needs to take a snack, because he did not eat enough dinner to carry him over. Patient verbalizing understanding. Fasting blood sugars are 155 5/30,112 5/31,106 6/1,130 6/2 and 123 this morning. He weigh 216 on 6/2/2019. He feels just being off the 2 pills has made a big difference, in his water weight. Will not call on Weds unless he has an issue. Next out reach will be  Weds. 6/12/2019 or sooner if concerns arise .

## 2019-06-06 NOTE — PATIENT DISCUSSION
Currently he has a mini monovision going on, may want to consider keeping following surgery.   Right eye dominant and is his distance eye, Left is near

## 2019-06-12 ENCOUNTER — PATIENT OUTREACH (OUTPATIENT)
Dept: FAMILY MEDICINE CLINIC | Age: 79
End: 2019-06-12

## 2019-06-12 NOTE — PROGRESS NOTES
Patient called NN as requested for blood glucose readings for week since being on 20 units Lantus:   Date        AM            PM               Lantuss dose           HS Snack                    Hypoglycemia episodes  . 6/6           92            179                20 units                      Yes                                   6/7           103           189               20 units        Yes  6/8      115  223        20 Units                     No ate out for dinner  6/9           126            170              20 units                       Yes  6/10         110            212              20 units                       No ate out for dinner  6/11         129           187                20 units                       No  6/12          109    Patient instructed:  1. Continue with his exercising at least 4 times a week for 45 minutes as he stated. 2. Monitor what foods eating when going out carbs turn into sugars, more veggies and salads. 3. We would like for your fasting glucose be below 100 and PM  Below 150.  Dr Jen Cortes will be notified of readings, as you have an appointment 6/21/2019

## 2019-06-12 NOTE — Clinical Note
Dr Franks Late Mr Dimitri Barry Blood sugars Fasting ranging 103-129 and pm is 170-223. Remains on 20 units Lantus f/u 6/21/2019.  I think he needs to go up to 25 units but you can decide at his office visit

## 2019-06-20 ENCOUNTER — PATIENT OUTREACH (OUTPATIENT)
Dept: FAMILY MEDICINE CLINIC | Age: 79
End: 2019-06-20

## 2019-06-20 NOTE — Clinical Note
Hi Dr Gibson Gage, Patient remains on 20 units fasting's for the week remains above 120 and pm above 175 with 3 over 200. He state's [place] but what he tells me what he is eating he needs a refresher course, his Lantuss needs a little increase based on his weight as well. He will see you on tomorrow for follow.

## 2019-06-20 NOTE — PROGRESS NOTES
NN made weekly out reach call as requested for blood glucose readings for week since being on 20 units Lantus:   Date        AM            PM               Lantuss dose           HS Snack                    Hypoglycemia episodes  6/13          135           182              16 units                      No                                 NO     6/14         136           187               20 units        NO  6/15      115           163        20 units                       6/16          121           148               20 units                       Yes  6/17         122            205               20 units                       No ate out for dinner  6/18         134            203                20 units                       No  6/19         113            205                20 units  6/20          115    Patient instructed:  1. Continue with his exercising at least 5-6 times a week for 42-43 minutes as he stated. 2. Monitor what foods eating when going out carbs turn into sugars, more veggies and salads. 3. We would like for your fasting glucose be below 100 and PM  Below 150. Dr Marisa Sarkar will be notified of readings, as you have an appointment 6/21/2019  4. Maybe a dietician referral is needed, patent state's he has [place]. Nothing like a refresher course. Patient encourage not to get discourage. Maybe the lantuss dose need to be increased. Will discuss with Dr Marisa Sarkar   5. When there is not enough to give self the full dose then open another pen to complete the full dose. .  Goals Addressed                 This Visit's Progress       Diabetes     Knowledge and adherence of medication (ie. action, side effects, missed dose, etc.)   On track     New to lantus 20 units daily  Instructed in use including S/E,missed dose, how it work       Patient verbalizes understanding of self -management goals of living with Diabetes.    On track     Skills necessary to properly manage their diabetes, including use of devices and symptom monitoring tools. On track     Patient checks blood glucose daily some times the 2 or 3 times or none. Instructed to take daily fasting first thin in the morning       To decrease or maintain patient's biometrics:  HgA1c 7 mg/dL, /80 or less. LDL of less than 100 and/or less than 70 in a patient with CAD. Not on track     HgA1C 7.9 ( 5/7/2019)  6/20/2019  Blood sugars remains above 110 fasting and above 150 at bed time  Remains on 20 units of Lantuss       Understand Diabetic action plan. (Ie. when to seek medical care,  what to do with high and low blood glucose, how and when to adjust diabetes treatment.    On track

## 2019-06-21 ENCOUNTER — OFFICE VISIT (OUTPATIENT)
Dept: FAMILY MEDICINE CLINIC | Age: 79
End: 2019-06-21

## 2019-06-21 VITALS
HEIGHT: 67 IN | HEART RATE: 59 BPM | SYSTOLIC BLOOD PRESSURE: 120 MMHG | BODY MASS INDEX: 32.62 KG/M2 | RESPIRATION RATE: 18 BRPM | WEIGHT: 207.8 LBS | DIASTOLIC BLOOD PRESSURE: 68 MMHG | OXYGEN SATURATION: 93 % | TEMPERATURE: 97.5 F

## 2019-06-21 DIAGNOSIS — E11.22 TYPE 2 DIABETES MELLITUS WITH STAGE 3 CHRONIC KIDNEY DISEASE, WITHOUT LONG-TERM CURRENT USE OF INSULIN (HCC): ICD-10-CM

## 2019-06-21 DIAGNOSIS — I10 ESSENTIAL HYPERTENSION: ICD-10-CM

## 2019-06-21 DIAGNOSIS — N18.30 CKD (CHRONIC KIDNEY DISEASE) STAGE 3, GFR 30-59 ML/MIN (HCC): ICD-10-CM

## 2019-06-21 DIAGNOSIS — N18.30 TYPE 2 DIABETES MELLITUS WITH STAGE 3 CHRONIC KIDNEY DISEASE, WITHOUT LONG-TERM CURRENT USE OF INSULIN (HCC): ICD-10-CM

## 2019-06-21 DIAGNOSIS — E11.9 CONTROLLED TYPE 2 DIABETES MELLITUS WITHOUT COMPLICATION, WITHOUT LONG-TERM CURRENT USE OF INSULIN (HCC): Primary | ICD-10-CM

## 2019-06-21 DIAGNOSIS — E66.09 CLASS 1 OBESITY DUE TO EXCESS CALORIES WITH SERIOUS COMORBIDITY AND BODY MASS INDEX (BMI) OF 32.0 TO 32.9 IN ADULT: ICD-10-CM

## 2019-06-21 RX ORDER — INSULIN GLARGINE 100 [IU]/ML
20 INJECTION, SOLUTION SUBCUTANEOUS DAILY
Qty: 10 PEN | Refills: 0 | Status: SHIPPED | OUTPATIENT
Start: 2019-06-21 | End: 2019-08-27 | Stop reason: SDUPTHER

## 2019-06-21 NOTE — PATIENT INSTRUCTIONS
Learning About the [address] [facility] Diet  What is the Mediterranean diet? The Mediterranean diet is a style of eating rather than a diet plan. It features foods eaten in [place], [place], [place] and [place], and other countries along the [facility]. It emphasizes eating foods like fish, fruits, vegetables, beans, high-fiber breads and whole grains, nuts, and olive oil. This style of eating includes limited red meat, cheese, and sweets. Why choose the Mediterranean diet? A Mediterranean-style diet may improve heart health. It contains more fat than other heart-healthy diets. But the fats are mainly from nuts, unsaturated oils (such as fish oils and olive oil), and certain nut or seed oils (such as canola, soybean, or flaxseed oil). These fats may help protect the heart and blood vessels. How can you get started on the Mediterranean diet? Here are some things you can do to switch to a more Mediterranean way of eating. What to eat  · Eat a variety of fruits and vegetables each day, such as grapes, blueberries, tomatoes, broccoli, peppers, figs, olives, spinach, eggplant, beans, lentils, and chickpeas. · Eat a variety of whole-grain foods each day, such as oats, brown rice, and whole wheat bread, pasta, and couscous. · Eat fish at least 2 times a week. Try tuna, salmon, mackerel, lake trout, herring, or sardines. · Eat moderate amounts of low-fat dairy products, such as milk, cheese, or yogurt. · Eat moderate amounts of poultry and eggs. · Choose healthy (unsaturated) fats, such as nuts, olive oil, and certain nut or seed oils like canola, soybean, and flaxseed. · Limit unhealthy (saturated) fats, such as butter, palm oil, and coconut oil. And limit fats found in animal products, such as meat and dairy products made with whole milk. Try to eat red meat only a few times a month in very small amounts. · Limit sweets and desserts to only a few times a week.  This includes sugar-sweetened drinks like soda. The Mediterranean diet may also include red wine with your meal--1 glass each day for women and up to 2 glasses a day for men. Tips for eating at home  · Use herbs, spices, garlic, lemon zest, and citrus juice instead of salt to add flavor to foods. · Add avocado slices to your sandwich instead of menendez. · Have fish for lunch or dinner instead of red meat. Brush the fish with olive oil, and broil or grill it. · Sprinkle your salad with seeds or nuts instead of cheese. · Cook with olive or canola oil instead of butter or oils that are high in saturated fat. · Switch from 2% milk or whole milk to 1% or fat-free milk. · Dip raw vegetables in a vinaigrette dressing or hummus instead of dips made from mayonnaise or sour cream.  · Have a piece of fruit for dessert instead of a piece of cake. Try baked apples, or have some dried fruit. Tips for eating out  · Try broiled, grilled, baked, or poached fish instead of having it fried or breaded. · Ask your  to have your meals prepared with olive oil instead of butter. · Order dishes made with marinara sauce or sauces made from olive oil. Avoid sauces made from cream or mayonnaise. · Choose whole-grain breads, whole wheat pasta and pizza crust, brown rice, beans, and lentils. · Cut back on butter or margarine on bread. Instead, you can dip your bread in a small amount of olive oil. · Ask for a side salad or grilled vegetables instead of french fries or chips. Where can you learn more? Go to http://maverick-frederick.info/. Enter 876-745-6127 in the search box to learn more about \"Learning About the Mediterranean Diet. \"  Current as of: March 28, 2018  Content Version: 11.9  © 6894-2531 [employer]. Care instructions adapted under license by [employer] (which disclaims liability or warranty for this information).  If you have questions about a medical condition or this instruction, always ask your healthcare professional. [address] any warranty or liability for your use of this information.

## 2019-06-21 NOTE — PROGRESS NOTES
Jorge Euceda is a 67 y.o. male (: 1940) presenting to address:    Chief Complaint   Patient presents with    Diabetes       Vitals:    19 0745   BP: 120/68   Pulse: (!) 59   Resp: 18   Temp: 97.5 °F (36.4 °C)   TempSrc: Oral   SpO2: 93%   Weight: 207 lb 12.8 oz (94.3 kg)   Height: 5' 7\" (1.702 m)   PainSc:   0 - No pain       Hearing/Vision:   No exam data present    Learning Assessment:     Learning Assessment 2017   PRIMARY LEARNER Patient   HIGHEST LEVEL OF EDUCATION - PRIMARY LEARNER  4 YEARS OF COLLEGE   BARRIERS PRIMARY LEARNER NONE   CO-LEARNER CAREGIVER No   PRIMARY LANGUAGE ENGLISH    NEED No   LEARNER PREFERENCE PRIMARY VIDEOS   ANSWERED BY self   RELATIONSHIP SELF     Depression Screening:     3 most recent PHQ Screens 2019   Little interest or pleasure in doing things Not at all   Feeling down, depressed, irritable, or hopeless Not at all   Total Score PHQ 2 0     Fall Risk Assessment:     Fall Risk Assessment, last 12 mths 2019   Able to walk? Yes   Fall in past 12 months? No     Abuse Screening:     Abuse Screening Questionnaire 2019   Do you ever feel afraid of your partner? N   Are you in a relationship with someone who physically or mentally threatens you? N   Is it safe for you to go home? Y     Coordination of Care Questionaire:   1. Have you been to the ER, urgent care clinic since your last visit? Hospitalized since your last visit? NO    2. Have you seen or consulted any other health care providers outside of the [facility] since your last visit? Include any pap smears or colon screening. YES Dermatologist; colonoscopy    Advanced Directive:   1. Do you have an Advanced Directive? YES    2. Would you like information on Advanced Directives?  NO

## 2019-06-21 NOTE — PROGRESS NOTES
Assessment/Plan:    1. Controlled type 2 diabetes mellitus without complication, without long-term current use of insulin (HCC)  -cont current regimen. Discussed diabetic diet, insulin resistance as it relates to obesity.     - HEMOGLOBIN A1C W/O EAG; Future    2. Essential hypertension  -cont current    3. CKD (chronic kidney disease) stage 3, GFR 30-59 ml/min ([facility])  -increase water intake. Ck labs. - METABOLIC PANEL, BASIC; Future    4. Obesity - cont weight loss efforts with goal of losing another 20lb. A total of 30 minutes was spent with the patient of which 30 minutes was spent in counseling regarding diabetes, weight loss, diet. The plan was discussed with the patient. The patient verbalized understanding and is in agreement with the plan. All medication potential side effects were discussed with the patient. Health Maintenance:   Health Maintenance   Topic Date Due    Influenza Age 5 to Adult  08/01/2019    HEMOGLOBIN A1C Q6M  11/01/2019    GLAUCOMA SCREENING Q2Y  01/18/2020    EYE EXAM RETINAL OR DILATED  01/18/2020    FOOT EXAM Q1  02/05/2020    LIPID PANEL Q1  05/01/2020    MEDICARE YEARLY EXAM  05/07/2020    MICROALBUMIN Q1  05/07/2020    DTaP/Tdap/Td series (2 - Td) 05/19/2027    Shingrix Vaccine Age 50>  Completed    Pneumococcal 65+ years  Completed       Melanie Chen is a 67 y.o. male and presents with Diabetes     Subjective:  HTN - bp is great. No medication side effects. CKD - decrease in GFR recently. Is using some diuretics due to edema (which is now resolved). DM2 - A1c 7.9. Fasting bs low 100s. Pm readings in 200s. He's lost 20lb in past month. He is exercising regularly. ROS:  Constitutional: No recent weight change. No weakness/fatigue. No f/c. Cardiovascular: No CP/palpitations. No TORO/orthopnea/PND. Respiratory: No cough/sputum, dyspnea, wheezing. Gastointestinal: No dysphagia, reflux. No n/v. No constipation/diarrhea.   No melena/rectal bleeding. The problem list was updated as a part of today's visit. Patient Active Problem List   Diagnosis Code    Controlled type 2 diabetes mellitus without complication, without long-term current use of insulin (HCC) E11.9    CKD (chronic kidney disease) stage 3, GFR 30-59 ml/min ([facility]) N18.3    Essential hypertension I10    Elevated PSA R97.20    Benign prostatic hyperplasia with lower urinary tract symptoms N40.1    B12 deficiency E53.8    Diabetic nephropathy associated with type 2 diabetes mellitus (HCC) E11.21    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 cholecystitis K80.21    Adenomatous polyp of descending colon D12.4    Localized edema R60.0    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 SH:  Social History     Tobacco Use    Smoking status: Never Smoker    Smokeless tobacco: Never Used   Substance Use Topics    Alcohol use: No    Drug use: No       Medications/Allergies:  Current Outpatient Medications on File Prior to Visit   Medication Sig Dispense Refill    hydrALAZINE (APRESOLINE) 100 mg tablet Take 1 Tab by mouth three (3) times daily. 270 Tab 0    insulin glargine (LANTUS,BASAGLAR) 100 unit/mL (3 mL) inpn 20 Units by SubCUTAneous route daily. 10 Pen 0    SITagliptin (JANUVIA) 50 mg tablet TAKE 1 TABLET BY MOUTH  DAILY 90 Tab 0    Insulin Needles, Disposable, 31 gauge x 5/16\" ndle Use daily with lantus. e11.65 100 Package 3    tamsulosin (FLOMAX) 0.4 mg capsule TAKE 1 CAPSULE BY MOUTH  DAILY AFTER DINNER 90 Cap 3    furosemide (LASIX) 40 mg tablet TAKE 1 TABLET BY MOUTH  MONDAY, WEDNESDAY, AND  FRIDAY 39 Tab 1    nebivolol (BYSTOLIC) 5 mg tablet Take 1 Tab by mouth daily. 90 Tab 1    triamcinolone acetonide (KENALOG) 0.1 % topical cream Apply  to affected area two (2) times a day.  use thin layer 454 g 3    albuterol (PROAIR HFA) 90 mcg/actuation inhaler ProAir HFA 90 mcg/actuation aerosol inhaler      albuterol (PROVENTIL VENTOLIN) 2.5 mg /3 mL (0.083 %) nebulizer solution 3 mL by Nebulization route every four (4) hours as needed for Wheezing. 24 Each 1    atorvastatin (LIPITOR) 10 mg tablet TAKE 1 TABLET BY MOUTH  DAILY 90 Tab 3    albuterol (PROVENTIL HFA, VENTOLIN HFA, PROAIR HFA) 90 mcg/actuation inhaler Take 2 Puffs by inhalation every four (4) hours as needed for Wheezing. 1 Inhaler 11    glucose blood VI test strips (ONETOUCH ULTRA TEST) strip Test blood glucose daily. E11.9 100 Strip 3    cyanocobalamin 1,000 mcg tablet Take 1,000 mcg by mouth daily.  cholecalciferol (VITAMIN D3) 1,000 unit cap Take  by mouth daily.  vitamin E (AQUA GEMS) 400 unit capsule Take  by mouth daily.  fluticasone (FLONASE) 50 mcg/actuation nasal spray INHALE 2 SPRAYS IN EACH  NOSTRIL DAILY 16 g 3    sodium bicarbonate 325 mg tablet Take 1 Tab by mouth daily. 90 Tab 0    aspirin 81 mg chewable tablet Take 81 mg by mouth daily.  cranberry extract 450 mg tab Take  by mouth.  ascorbic acid, vitamin C, (VITAMIN C) 500 mg tablet Take 1,000 mg by mouth.  OTHER Indications: Osteo Bi-Flex 1 per day       No current facility-administered medications on file prior to visit. Allergies   Allergen Reactions    Norvasc [Amlodipine] Swelling     Patient states NKDA       Objective:  Visit Vitals  /68 (BP 1 Location: Left arm, BP Patient Position: Sitting)   Pulse (!) 59   Temp 97.5 °F (36.4 °C) (Oral)   Resp 18   Ht 5' 7\" (1.702 m)   Wt 207 lb 12.8 oz (94.3 kg)   SpO2 93%   BMI 32.55 kg/m²      Constitutional: Well developed, nourished, no distress, alert, obese habitus   CV: S1, S2.  RRR. No murmurs/rubs. No edema. Pulm: No abnormalities on inspection. Clear to auscultation bilaterally. No wheezing/rhonchi. Normal effort.

## 2019-06-25 DIAGNOSIS — E11.22 TYPE 2 DIABETES MELLITUS WITH STAGE 3 CHRONIC KIDNEY DISEASE, WITHOUT LONG-TERM CURRENT USE OF INSULIN (HCC): ICD-10-CM

## 2019-06-25 DIAGNOSIS — N18.30 TYPE 2 DIABETES MELLITUS WITH STAGE 3 CHRONIC KIDNEY DISEASE, WITHOUT LONG-TERM CURRENT USE OF INSULIN (HCC): ICD-10-CM

## 2019-06-25 DIAGNOSIS — I10 ESSENTIAL HYPERTENSION: ICD-10-CM

## 2019-06-25 RX ORDER — HYDRALAZINE HYDROCHLORIDE 100 MG/1
TABLET, FILM COATED ORAL
Qty: 270 TAB | Refills: 0 | Status: SHIPPED | OUTPATIENT
Start: 2019-06-25 | End: 2019-09-04 | Stop reason: SDUPTHER

## 2019-06-28 ENCOUNTER — PATIENT OUTREACH (OUTPATIENT)
Dept: FAMILY MEDICINE CLINIC | Age: 79
End: 2019-06-28

## 2019-06-28 NOTE — PROGRESS NOTES
.  Patient has graduated from the [employer] and Risk Prevention  program on 6/28/2019. Patient's symptoms are stable at this time. Patient/family has the ability to self-manage. Care management goals have been completed at this time. No further nurse navigator follow up scheduled. Goals Addressed                 This Visit's Progress       Diabetes     COMPLETED: Knowledge and adherence of medication (ie. action, side effects, missed dose, etc.)        New to lantus 20 units daily  Instructed in use including S/E,missed dose, how it work       COMPLETED: Patient verbalizes understanding of self -management goals of living with Diabetes.  COMPLETED: Skills necessary to properly manage their diabetes, including use of devices and symptom monitoring tools. Patient checks blood glucose daily some times the 2 or 3 times or none. Instructed to take daily fasting first thin in the morning       COMPLETED: To decrease or maintain patient's biometrics:  HgA1c 7 mg/dL, /80 or less. LDL of less than 100 and/or less than 70 in a patient with CAD. HgA1C 7.9 ( 5/7/2019)  6/20/2019  Blood sugars remains above 110 fasting and above 150 at bed time  Remains on 20 units of Lantuss       COMPLETED: Understand Diabetic action plan. (Ie. when to seek medical care,  what to do with high and low blood glucose, how and when to adjust diabetes treatment. Pt has nurse navigator's contact information for any further questions, concerns, or needs.   Patients upcoming visits:    Future Appointments   Date Time Provider Chrissy Walton   7/22/2019  8:00 AM Malika Parikh MD [address] [facility]   9/4/2019  7:30 AM Jacquei Oswald MD [facility]

## 2019-07-15 RX ORDER — TAMSULOSIN HYDROCHLORIDE 0.4 MG/1
CAPSULE ORAL
Qty: 90 CAP | Refills: 3 | Status: SHIPPED | OUTPATIENT
Start: 2019-07-15 | End: 2019-09-04 | Stop reason: SDUPTHER

## 2019-07-15 RX ORDER — ATORVASTATIN CALCIUM 10 MG/1
10 TABLET, FILM COATED ORAL DAILY
Qty: 90 TAB | Refills: 3 | Status: SHIPPED | OUTPATIENT
Start: 2019-07-15 | End: 2019-09-04 | Stop reason: SDUPTHER

## 2019-07-15 RX ORDER — FUROSEMIDE 40 MG/1
TABLET ORAL
Qty: 39 TAB | Refills: 1 | Status: SHIPPED | OUTPATIENT
Start: 2019-07-15 | End: 2019-07-22 | Stop reason: SDUPTHER

## 2019-07-19 ENCOUNTER — IMPORTED ENCOUNTER (OUTPATIENT)
Dept: URBAN - METROPOLITAN AREA CLINIC 1 | Facility: CLINIC | Age: 79
End: 2019-07-19

## 2019-07-19 PROBLEM — H40.013: Noted: 2019-07-19

## 2019-07-19 PROBLEM — Z79.4: Noted: 2019-07-19

## 2019-07-19 PROBLEM — H25.813: Noted: 2019-07-19

## 2019-07-19 PROBLEM — E11.9: Noted: 2019-07-19

## 2019-07-19 PROCEDURE — 92250 FUNDUS PHOTOGRAPHY W/I&R: CPT

## 2019-07-19 PROCEDURE — 92004 COMPRE OPH EXAM NEW PT 1/>: CPT

## 2019-07-19 PROCEDURE — 92015 DETERMINE REFRACTIVE STATE: CPT

## 2019-07-19 NOTE — PATIENT DISCUSSION
Glaucoma Suspect OU (CD 0.80/0.65): Negative family hx. Patient is considered Low Risk. Condition was discussed with patient and patient understands. Will continue to monitor patient for any progression in condition. Patient was advised to call us with any problems questions or concerns.

## 2019-07-19 NOTE — PATIENT DISCUSSION
1.  DM Type II (Insulin) without sign of diabetic retinopathy and no blot heme on dilated retinal examination today OU No Macular Edema:  Discussed the pathophysiology of diabetes and its effect on the eye and risk of blindness. Stressed the importance of strong glucose control. Advised of importance of at least yearly dilated examinations but to contact us immediately for any problems or concerns. 2. Cataract OU: Observe for now without intervention. The patient was advised to contact us if any change or worsening of vision3. Glaucoma Suspect OU (CD 0.80/0.65): Negative family hx. Baseline photos show disc cupping OU. Patient is considered Low Risk. Condition was discussed with patient and patient understands. Will continue to monitor patient for any progression in condition. Patient was advised to call us with any problems questions or concerns. MRX for glasses given. Return for an appointment in 6 months DFE/OCT/glare with Dr. Steve Pruitt.

## 2019-07-22 RX ORDER — FUROSEMIDE 40 MG/1
40 TABLET ORAL DAILY
Qty: 90 TAB | Refills: 1 | Status: SHIPPED | OUTPATIENT
Start: 2019-07-22 | End: 2019-09-04 | Stop reason: SDUPTHER

## 2019-08-15 NOTE — PATIENT DISCUSSION
Currently he has a mini monovision going on, may want to consider keeping following surgery. Right eye dominant and is his distance eye, Left is near.

## 2019-08-15 NOTE — PATIENT DISCUSSION
New Prescription: prednisolone acetate (prednisolone acetate): drops,suspension: 1% 1 drop four times a day into affected eye 08-

## 2019-08-15 NOTE — PATIENT DISCUSSION
New Prescription: ketorolac (ketorolac): drops: 0.5% 1 drop four times a day as directed into affected eye 08-

## 2019-08-15 NOTE — PATIENT DISCUSSION
CATS OU - pt ready for surgery, he states vision is worsening. Would still like to keep a little mini monovision like he has has for quite awhile. Keep OS near and OD distance.

## 2019-08-27 DIAGNOSIS — E11.22 TYPE 2 DIABETES MELLITUS WITH STAGE 3 CHRONIC KIDNEY DISEASE, WITHOUT LONG-TERM CURRENT USE OF INSULIN (HCC): ICD-10-CM

## 2019-08-27 DIAGNOSIS — N18.30 TYPE 2 DIABETES MELLITUS WITH STAGE 3 CHRONIC KIDNEY DISEASE, WITHOUT LONG-TERM CURRENT USE OF INSULIN (HCC): ICD-10-CM

## 2019-08-27 RX ORDER — INSULIN GLARGINE 100 [IU]/ML
INJECTION, SOLUTION SUBCUTANEOUS
Qty: 30 ML | Refills: 0 | Status: SHIPPED | OUTPATIENT
Start: 2019-08-27 | End: 2019-09-04 | Stop reason: SDUPTHER

## 2019-09-04 ENCOUNTER — HOSPITAL ENCOUNTER (OUTPATIENT)
Dept: LAB | Age: 79
Discharge: HOME | End: 2019-09-04
Payer: MEDICARE

## 2019-09-04 ENCOUNTER — OFFICE VISIT (OUTPATIENT)
Dept: FAMILY MEDICINE CLINIC | Age: 79
End: 2019-09-04

## 2019-09-04 VITALS
DIASTOLIC BLOOD PRESSURE: 61 MMHG | TEMPERATURE: 97.8 F | SYSTOLIC BLOOD PRESSURE: 122 MMHG | BODY MASS INDEX: 33.78 KG/M2 | HEIGHT: 67 IN | RESPIRATION RATE: 18 BRPM | OXYGEN SATURATION: 95 % | HEART RATE: 58 BPM | WEIGHT: 215.2 LBS

## 2019-09-04 DIAGNOSIS — N18.30 TYPE 2 DIABETES MELLITUS WITH STAGE 3 CHRONIC KIDNEY DISEASE, WITHOUT LONG-TERM CURRENT USE OF INSULIN (HCC): Primary | ICD-10-CM

## 2019-09-04 DIAGNOSIS — H10.13 ALLERGIC CONJUNCTIVITIS OF BOTH EYES: ICD-10-CM

## 2019-09-04 DIAGNOSIS — E11.9 CONTROLLED TYPE 2 DIABETES MELLITUS WITHOUT COMPLICATION, WITHOUT LONG-TERM CURRENT USE OF INSULIN (HCC): ICD-10-CM

## 2019-09-04 DIAGNOSIS — N18.30 CKD (CHRONIC KIDNEY DISEASE) STAGE 3, GFR 30-59 ML/MIN (HCC): ICD-10-CM

## 2019-09-04 DIAGNOSIS — Z23 ENCOUNTER FOR IMMUNIZATION: ICD-10-CM

## 2019-09-04 DIAGNOSIS — E11.22 TYPE 2 DIABETES MELLITUS WITH STAGE 3 CHRONIC KIDNEY DISEASE, WITHOUT LONG-TERM CURRENT USE OF INSULIN (HCC): Primary | ICD-10-CM

## 2019-09-04 DIAGNOSIS — I10 ESSENTIAL HYPERTENSION: ICD-10-CM

## 2019-09-04 LAB
ANION GAP SERPL CALC-SCNC: 7 MMOL/L (ref 3–18)
BUN SERPL-MCNC: 34 MG/DL (ref 7–18)
BUN/CREAT SERPL: 20 (ref 12–20)
CALCIUM SERPL-MCNC: 8.9 MG/DL (ref 8.5–10.1)
CHLORIDE SERPL-SCNC: 105 MMOL/L (ref 100–111)
CO2 SERPL-SCNC: 29 MMOL/L (ref 21–32)
CREAT SERPL-MCNC: 1.7 MG/DL (ref 0.6–1.3)
GLUCOSE SERPL-MCNC: 212 MG/DL (ref 74–99)
HBA1C MFR BLD HPLC: 8 %
HBA1C MFR BLD: 7.8 % (ref 4.2–5.6)
POTASSIUM SERPL-SCNC: 4.7 MMOL/L (ref 3.5–5.5)
SODIUM SERPL-SCNC: 141 MMOL/L (ref 136–145)

## 2019-09-04 PROCEDURE — 83036 HEMOGLOBIN GLYCOSYLATED A1C: CPT

## 2019-09-04 PROCEDURE — 36415 COLL VENOUS BLD VENIPUNCTURE: CPT

## 2019-09-04 PROCEDURE — 80048 BASIC METABOLIC PNL TOTAL CA: CPT

## 2019-09-04 RX ORDER — HYDRALAZINE HYDROCHLORIDE 100 MG/1
TABLET, FILM COATED ORAL
Qty: 270 TAB | Refills: 1 | Status: SHIPPED | OUTPATIENT
Start: 2019-09-04 | End: 2020-04-21

## 2019-09-04 RX ORDER — NEBIVOLOL 5 MG/1
5 TABLET ORAL DAILY
Qty: 90 TAB | Refills: 1 | Status: SHIPPED | OUTPATIENT
Start: 2019-09-04 | End: 2020-05-27

## 2019-09-04 RX ORDER — SODIUM BICARBONATE 325 MG/1
325 TABLET ORAL DAILY
Qty: 90 TAB | Refills: 0 | Status: SHIPPED | OUTPATIENT
Start: 2019-09-04 | End: 2020-10-15

## 2019-09-04 RX ORDER — TRIAMCINOLONE ACETONIDE 1 MG/G
CREAM TOPICAL 2 TIMES DAILY
Qty: 454 G | Refills: 3 | Status: SHIPPED | OUTPATIENT
Start: 2019-09-04 | End: 2021-02-05 | Stop reason: SDUPTHER

## 2019-09-04 RX ORDER — OLOPATADINE HYDROCHLORIDE 2 MG/ML
1 SOLUTION/ DROPS OPHTHALMIC DAILY
Qty: 7.5 ML | Refills: 0 | Status: SHIPPED | OUTPATIENT
Start: 2019-09-04 | End: 2019-09-04 | Stop reason: SDUPTHER

## 2019-09-04 RX ORDER — INSULIN GLARGINE 100 [IU]/ML
INJECTION, SOLUTION SUBCUTANEOUS
Qty: 30 ML | Refills: 0 | Status: SHIPPED | OUTPATIENT
Start: 2019-09-04 | End: 2020-05-11

## 2019-09-04 RX ORDER — ATORVASTATIN CALCIUM 10 MG/1
10 TABLET, FILM COATED ORAL DAILY
Qty: 90 TAB | Refills: 3 | Status: SHIPPED | OUTPATIENT
Start: 2019-09-04 | End: 2020-08-21

## 2019-09-04 RX ORDER — FLUTICASONE PROPIONATE 50 MCG
SPRAY, SUSPENSION (ML) NASAL
Qty: 16 G | Refills: 3 | Status: SHIPPED | OUTPATIENT
Start: 2019-09-04 | End: 2020-08-20

## 2019-09-04 RX ORDER — OLOPATADINE HYDROCHLORIDE 2 MG/ML
1 SOLUTION/ DROPS OPHTHALMIC DAILY
Qty: 7.5 ML | Refills: 0 | Status: SHIPPED | OUTPATIENT
Start: 2019-09-04

## 2019-09-04 RX ORDER — TAMSULOSIN HYDROCHLORIDE 0.4 MG/1
CAPSULE ORAL
Qty: 90 CAP | Refills: 3 | Status: SHIPPED | OUTPATIENT
Start: 2019-09-04 | End: 2020-08-21

## 2019-09-04 RX ORDER — FUROSEMIDE 40 MG/1
40 TABLET ORAL DAILY
Qty: 90 TAB | Refills: 1 | Status: SHIPPED | OUTPATIENT
Start: 2019-09-04 | End: 2020-01-09 | Stop reason: SDUPTHER

## 2019-09-04 RX ORDER — GLIMEPIRIDE 1 MG/1
1 TABLET ORAL
Qty: 90 TAB | Refills: 0
Start: 2019-09-04 | End: 2020-09-11 | Stop reason: SDUPTHER

## 2019-09-04 NOTE — TELEPHONE ENCOUNTER
Requested Prescriptions     Pending Prescriptions Disp Refills    olopatadine (PATADAY) 0.2 % drop ophthalmic solution 7.5 mL 0     Sig: Administer 1 Drop to both eyes daily. Patient calling to request refill on:      Remaining pills:  Last appt:  Next appt: Future Appointments   Date Time Provider Chrissy Walton   12/4/2019  7:30 AM Wong Lopez MD [address] [place] [address] @ [place]    Patient aware of 72 hour time frame.       [facility]

## 2019-09-04 NOTE — PROGRESS NOTES
Assessment/Plan:    1. Type 2 diabetes mellitus with stage 3 chronic kidney disease, without long-term current use of insulin (HCC)  -add amaryl 1mg. F/u in 3 mos. - AMB POC HEMOGLOBIN A1C  - insulin glargine (LANTUS SOLOSTAR U-100 INSULIN) 100 unit/mL (3 mL) inpn; INJECT SUBCUTANEOUSLY 20  UNITS DAILY  Dispense: 30 mL; Refill: 0  - SITagliptin (JANUVIA) 50 mg tablet; TAKE 1 TABLET BY MOUTH  DAILY  Dispense: 90 Tab; Refill: 0    2. Essential hypertension  -cont current  - hydrALAZINE (APRESOLINE) 100 mg tablet; TAKE 1 TABLET BY MOUTH 3  TIMES DAILY  Dispense: 270 Tab; Refill: 1  - nebivolol (BYSTOLIC) 5 mg tablet; Take 1 Tab by mouth daily. Dispense: 90 Tab; Refill: 1    3. Encounter for immunization  - INFLUENZA VACCINE INACTIVATED (IIV), SUBUNIT, ADJUVANTED, IM  - ADMIN INFLUENZA VIRUS VAC    4. Allergic conjunctivitis of both eyes  -add patady. Cont flonase  - olopatadine (PATADAY) 0.2 % drop ophthalmic solution; Administer 1 Drop to both eyes daily. Dispense: 7.5 mL; Refill: 0      The plan was discussed with the patient. The patient verbalized understanding and is in agreement with the plan. All medication potential side effects were discussed with the patient. Health Maintenance:   Health Maintenance   Topic Date Due    Influenza Age 5 to Adult  08/01/2019    HEMOGLOBIN A1C Q6M  11/01/2019    GLAUCOMA SCREENING Q2Y  01/18/2020    EYE EXAM RETINAL OR DILATED  01/18/2020    FOOT EXAM Q1  02/05/2020    LIPID PANEL Q1  05/01/2020    MEDICARE YEARLY EXAM  05/07/2020    MICROALBUMIN Q1  05/07/2020    DTaP/Tdap/Td series (2 - Td) 05/19/2027    Shingrix Vaccine Age 50>  Completed    Pneumococcal 65+ years  Completed       Kalpana Michel is a 67 y.o. male and presents with Diabetes     Subjective:  DM2- A1c is 8.0. Has gained some weight since last visit. States his sugars are 'sometimes good and sometimes not so good.'  Am sugars are running 125s. No lows. HTN - bp is good.   Has stable CKD 3.  Followed by renal.    He c/o crusting, itchy red eyes for a few weeks. Usually bad in fall and spring. Is using flonase regularly. No eye pain or decreased vision. ROS:  Constitutional: No recent weight change. No weakness/fatigue. No f/c. Cardiovascular: No CP/palpitations. No TORO/orthopnea/PND. Respiratory: No cough/sputum, dyspnea, wheezing. Gastointestinal: No dysphagia, reflux. No n/v. No constipation/diarrhea. No melena/rectal bleeding. Neurological: No seizures/numbness/weakness. No paresthesias. Psychiatric:  No depression, anxiety. The problem list was updated as a part of today's visit. Patient Active Problem List   Diagnosis Code    Controlled type 2 diabetes mellitus without complication, without long-term current use of insulin ([facility]) E11.9    CKD (chronic kidney disease) stage 3, GFR 30-59 ml/min ([facility]) N18.3    Essential hypertension I10    Elevated PSA R97.20    Benign prostatic hyperplasia with lower urinary tract symptoms N40.1    B12 deficiency E53.8    Diabetic nephropathy associated with type 2 diabetes mellitus ([facility]) E11.21    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 cholecystitis K80.21    Adenomatous polyp of descending colon D12.4    Localized edema R60.0    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     SH:  Social History     Tobacco Use    Smoking status: Never Smoker    Smokeless tobacco: Never Used   Substance Use Topics    Alcohol use: No    Drug use: No       Medications/Allergies:  Current Outpatient Medications on File Prior to Visit   Medication Sig Dispense Refill    LANTUS SOLOSTAR U-100 INSULIN 100 unit/mL (3 mL) inpn INJECT SUBCUTANEOUSLY 20  UNITS DAILY 30 mL 0    sildenafil, antihypertensive, (REVATIO) 20 mg tablet Take 3-5 tablets one hour prior to sexual activity as needed. 90 Tab prn    furosemide (LASIX) 40 mg tablet Take 1 Tab by mouth daily. 90 Tab 1    atorvastatin (LIPITOR) 10 mg tablet Take 1 Tab by mouth daily. 90 Tab 3    tamsulosin (FLOMAX) 0.4 mg capsule TAKE 1 CAPSULE BY MOUTH  DAILY AFTER DINNER 90 Cap 3    SITagliptin (JANUVIA) 50 mg tablet TAKE 1 TABLET BY MOUTH  DAILY 90 Tab 0    hydrALAZINE (APRESOLINE) 100 mg tablet TAKE 1 TABLET BY MOUTH 3  TIMES DAILY 270 Tab 0    glucose blood VI test strips (ONETOUCH ULTRA TEST) strip Test blood glucose bid. E11.9 300 Strip 3    Insulin Needles, Disposable, 31 gauge x 5/16\" ndle Use daily with lantus. e11.65 100 Package 3    nebivolol (BYSTOLIC) 5 mg tablet Take 1 Tab by mouth daily. 90 Tab 1    triamcinolone acetonide (KENALOG) 0.1 % topical cream Apply  to affected area two (2) times a day. use thin layer 454 g 3    albuterol (PROVENTIL VENTOLIN) 2.5 mg /3 mL (0.083 %) nebulizer solution 3 mL by Nebulization route every four (4) hours as needed for Wheezing. 24 Each 1    albuterol (PROVENTIL HFA, VENTOLIN HFA, PROAIR HFA) 90 mcg/actuation inhaler Take 2 Puffs by inhalation every four (4) hours as needed for Wheezing. 1 Inhaler 11    cyanocobalamin 1,000 mcg tablet Take 1,000 mcg by mouth daily.  cholecalciferol (VITAMIN D3) 1,000 unit cap Take  by mouth daily.  vitamin E (AQUA GEMS) 400 unit capsule Take  by mouth daily.  fluticasone (FLONASE) 50 mcg/actuation nasal spray INHALE 2 SPRAYS IN EACH  NOSTRIL DAILY 16 g 3    sodium bicarbonate 325 mg tablet Take 1 Tab by mouth daily. 90 Tab 0    aspirin 81 mg chewable tablet Take 81 mg by mouth daily.  cranberry extract 450 mg tab Take  by mouth.  ascorbic acid, vitamin C, (VITAMIN C) 500 mg tablet Take 1,000 mg by mouth.  OTHER Indications: Osteo Bi-Flex 1 per day       No current facility-administered medications on file prior to visit.          Allergies   Allergen Reactions    Norvasc [Amlodipine] Swelling Patient states NKDA       Objective:  Visit Vitals  /61   Pulse (!) 58   Temp 97.8 °F (36.6 °C)   Resp 18   Ht 5' 7\" (1.702 m)   Wt 215 lb 3.2 oz (97.6 kg)   SpO2 95%   BMI 33.71 kg/m²      Constitutional: Well developed, nourished, no distress, alert   CV: S1, S2.  RRR. No murmurs/rubs. No edema. Pulm: No abnormalities on inspection. Clear to auscultation bilaterally. No wheezing/rhonchi. Normal effort. GI: Soft, nontender, nondistended. Normal active bowel sounds.     Ent: conjunctival redness

## 2019-09-04 NOTE — PROGRESS NOTES
Alfonso Matias is a 67 y.o. male (: 1940) presenting to address:    Chief Complaint   Patient presents with    Diabetes       Vitals:    19 0729   BP: 122/61   Pulse: (!) 58   Resp: 18   Temp: 97.8 °F (36.6 °C)   TempSrc: Oral   SpO2: 95%   Weight: 215 lb 3.2 oz (97.6 kg)   Height: 5' 7\" (1.702 m)   PainSc:   0 - No pain       Hearing/Vision:   No exam data present    Learning Assessment:     Learning Assessment 2017   PRIMARY LEARNER Patient   HIGHEST LEVEL OF EDUCATION - PRIMARY LEARNER  4 YEARS OF COLLEGE   BARRIERS PRIMARY LEARNER NONE   CO-LEARNER CAREGIVER No   PRIMARY LANGUAGE ENGLISH    NEED No   LEARNER PREFERENCE PRIMARY VIDEOS   ANSWERED BY self   RELATIONSHIP SELF     Depression Screening:     3 most recent PHQ Screens 2019   Little interest or pleasure in doing things Not at all   Feeling down, depressed, irritable, or hopeless Not at all   Total Score PHQ 2 0     Fall Risk Assessment:     Fall Risk Assessment, last 12 mths 2019   Able to walk? Yes   Fall in past 12 months? No     Abuse Screening:     Abuse Screening Questionnaire 2019   Do you ever feel afraid of your partner? N   Are you in a relationship with someone who physically or mentally threatens you? N   Is it safe for you to go home? Y     Coordination of Care Questionaire:   1. Have you been to the ER, urgent care clinic since your last visit? Hospitalized since your last visit? NO    2. Have you seen or consulted any other health care providers outside of the [address] [place] [address] since your last visit? Include any pap smears or colon screening. YES Hand doctor    Advanced Directive:   1. Do you have an Advanced Directive? YES    2. Would you like information on Advanced Directives?  NO

## 2019-09-11 DIAGNOSIS — N18.30 CHRONIC KIDNEY DISEASE, STAGE III (MODERATE) (HCC): Primary | ICD-10-CM

## 2019-09-11 DIAGNOSIS — E55.9 AVITAMINOSIS D: ICD-10-CM

## 2019-09-11 DIAGNOSIS — E55.9 VITAMIN D DEFICIENCY DISEASE: ICD-10-CM

## 2019-10-07 ENCOUNTER — IMPORTED ENCOUNTER (OUTPATIENT)
Dept: URBAN - METROPOLITAN AREA CLINIC 1 | Facility: CLINIC | Age: 79
End: 2019-10-07

## 2019-10-07 PROCEDURE — 92012 INTRM OPH EXAM EST PATIENT: CPT

## 2019-10-07 NOTE — PATIENT DISCUSSION
1.  Blepharoconjunctivitis w/ likely Allergic component -- Begin Tobradex QID OU (Erx'd). Begin daily Hot compresses x 5 minutes and lid scrubs/Baby Shampoo BID. 2. Cataract OU -- Observe for now without intervention. The patient was advised to contact us if any change or worsening of vision4. Glaucoma Suspect OU (CD 0.80/0.65) -- Negative family hx. Patient is considered Low Risk. 5. H/o DM Type II (Insulin)Return for an appointment in 2 weeks for a 10 with Dr. Andrea Ahuja.

## 2019-10-08 PROBLEM — H01.004: Noted: 2019-10-07

## 2019-10-08 PROBLEM — H10.45: Noted: 2019-10-07

## 2019-10-08 PROBLEM — H10.503: Noted: 2019-10-08

## 2019-10-08 PROBLEM — H01.001: Noted: 2019-10-07

## 2019-10-23 DIAGNOSIS — N18.30 TYPE 2 DIABETES MELLITUS WITH STAGE 3 CHRONIC KIDNEY DISEASE, WITHOUT LONG-TERM CURRENT USE OF INSULIN (HCC): ICD-10-CM

## 2019-10-23 DIAGNOSIS — E11.22 TYPE 2 DIABETES MELLITUS WITH STAGE 3 CHRONIC KIDNEY DISEASE, WITHOUT LONG-TERM CURRENT USE OF INSULIN (HCC): ICD-10-CM

## 2019-10-29 ENCOUNTER — IMPORTED ENCOUNTER (OUTPATIENT)
Dept: URBAN - METROPOLITAN AREA CLINIC 1 | Facility: CLINIC | Age: 79
End: 2019-10-29

## 2019-10-29 PROBLEM — H01.001: Noted: 2019-10-29

## 2019-10-29 PROBLEM — H01.004: Noted: 2019-10-29

## 2019-10-29 PROBLEM — H10.45: Noted: 2019-10-29

## 2019-10-29 PROBLEM — H10.523: Noted: 2019-10-29

## 2019-10-29 PROCEDURE — 92012 INTRM OPH EXAM EST PATIENT: CPT

## 2019-10-29 NOTE — PATIENT DISCUSSION
1.  Blepharoconjunctivitis w/ likely Allergic component - Much improved. Patient finished Tobradex QID OU. Restart daily Hot compresses x 5 minutes and lid scrubs/Baby Shampoo BID routinely. 2. Anterior Blepharitis OU - Daily Hot compresses and lid scrubs were recommended. 3. Allergic Conjunctivitis OU -- The condition was  discussed with the patient. Avoidance of allergens and cool compresses were recommended. May use OTC Zaditor BID OU PRN for itching 4. Cataract OU - Visually significant will re evaluate at next visit. The patient was advised to contact us if any change or worsening of vision5. Glaucoma Suspect OU (CD 0.80/0.65) -- Negative family hx. Patient is considered Low Risk. 6. H/o DM Type II (Insulin)Return for an appointment for Return as scheduled DFE/OCT/glare with Dr. Bárbara Burr.

## 2019-11-27 ENCOUNTER — OFFICE VISIT (OUTPATIENT)
Dept: FAMILY MEDICINE CLINIC | Age: 79
End: 2019-11-27

## 2019-11-27 ENCOUNTER — HOSPITAL ENCOUNTER (OUTPATIENT)
Dept: LAB | Age: 79
Discharge: HOME | End: 2019-11-27
Payer: COMMERCIAL

## 2019-11-27 VITALS
SYSTOLIC BLOOD PRESSURE: 134 MMHG | HEART RATE: 64 BPM | HEIGHT: 67 IN | DIASTOLIC BLOOD PRESSURE: 70 MMHG | TEMPERATURE: 98.7 F | RESPIRATION RATE: 18 BRPM | BODY MASS INDEX: 33.9 KG/M2 | WEIGHT: 216 LBS | OXYGEN SATURATION: 96 %

## 2019-11-27 DIAGNOSIS — R10.9 FLANK PAIN: Primary | ICD-10-CM

## 2019-11-27 DIAGNOSIS — R19.7 DIARRHEA OF PRESUMED INFECTIOUS ORIGIN: ICD-10-CM

## 2019-11-27 DIAGNOSIS — J18.9 COMMUNITY ACQUIRED PNEUMONIA, UNSPECIFIED LATERALITY: ICD-10-CM

## 2019-11-27 DIAGNOSIS — R10.9 FLANK PAIN: ICD-10-CM

## 2019-11-27 LAB
BILIRUB UR QL STRIP: NEGATIVE
GLUCOSE UR-MCNC: NEGATIVE MG/DL
KETONES P FAST UR STRIP-MCNC: NEGATIVE MG/DL
PH UR STRIP: 5 [PH] (ref 4.6–8)
PROT UR QL STRIP: NORMAL
SP GR UR STRIP: 1.02 (ref 1–1.03)
UA UROBILINOGEN AMB POC: NORMAL (ref 0.2–1)
URINALYSIS CLARITY POC: CLEAR
URINALYSIS COLOR POC: YELLOW
URINE BLOOD POC: NEGATIVE
URINE LEUKOCYTES POC: NEGATIVE
URINE NITRITES POC: NEGATIVE

## 2019-11-27 PROCEDURE — 89055 LEUKOCYTE ASSESSMENT FECAL: CPT

## 2019-11-27 PROCEDURE — 83631 LACTOFERRIN FECAL (QUANT): CPT

## 2019-11-27 PROCEDURE — 87086 URINE CULTURE/COLONY COUNT: CPT

## 2019-11-27 PROCEDURE — 87045 FECES CULTURE AEROBIC BACT: CPT

## 2019-11-27 RX ORDER — TRAMADOL HYDROCHLORIDE 50 MG/1
50 TABLET ORAL
Qty: 21 TAB | Refills: 0 | Status: SHIPPED | OUTPATIENT
Start: 2019-11-27 | End: 2019-12-04

## 2019-11-27 NOTE — PROGRESS NOTES
Assessment/Plan:    1. Flank pain- ddx includes nephrolithiasis vs pyelonephritis vs musculoskeletal.  Ck urine and kub. Pt is already on flomax. Tramadol for severe pain. - XR ABD (KUB); Future  - traMADol (ULTRAM) 50 mg tablet; Take 1 Tab by mouth every eight (8) hours as needed for Pain for up to 7 days. Max Daily Amount: 150 mg. Dispense: 21 Tab; Refill: 0  - AMB POC URINALYSIS DIP STICK AUTO W/O MICRO  - CULTURE, URINE; Future    2. Diarrhea of presumed infectious origin  -ck stool. ddx includes diverticulitis vs c diff/infectious  - C DIFFICILE TOXIN GENE, KRYS; Future  - WBC, STOOL; Future  - LACTOFERRIN, FECAL; Future  - CULTURE, STOOL; Future    3. Community acquired pneumonia, unspecified laterality  -improved. The plan was discussed with the patient. The patient verbalized understanding and is in agreement with the plan. All medication potential side effects were discussed with the patient. Health Maintenance:   Health Maintenance   Topic Date Due    GLAUCOMA SCREENING Q2Y  01/18/2020    EYE EXAM RETINAL OR DILATED  01/18/2020    FOOT EXAM Q1  02/05/2020    HEMOGLOBIN A1C Q6M  03/04/2020    LIPID PANEL Q1  05/01/2020    MEDICARE YEARLY EXAM  05/07/2020    MICROALBUMIN Q1  05/07/2020    DTaP/Tdap/Td series (2 - Td) 05/19/2027    Shingrix Vaccine Age 50>  Completed    Influenza Age 5 to Adult  Completed    Pneumococcal 65+ years  Completed       Isabel Gonzalez is a 67 y.o. male and presents with Back Pain and Diarrhea (x 5 weeks )     Subjective:  Pt recently treated for CAP with augmentin 11/16/19. No dyspnea. Minimal cough. He c/o 5 week h/o diarrhea. States diarrhea started prior to antibiotics. Has about 3-5 bm/day, watery. No nocturnal bm. Pt c/o LLQ abd pain, constant and not changing with respect to bm. Pain is worse at night. No f/c. Pt also fell onto his tailbone 3 weeks ago. Since then, having L>R sacral pain. Pain doesn't radiate.   Has been doing heating pad and seeing a chiropractor w/o relief. Pt has h/o kidney stones and wonders if that's related. Does not pink-tinged urine. ROS:  Constitutional: No recent weight change. No weakness/fatigue. No f/c. Cardiovascular: No CP/palpitations. No TORO/orthopnea/PND. Respiratory: + cough/no sputum, dyspnea, or wheezing. Gastointestinal: No dysphagia, reflux. No n/v. No constipation/+diarrhea. No melena/rectal bleeding. Genitourinary: No dysuria, urinary hesitancy, nocturia, +hematuria. No incontinence. Musculoskeletal: No joint pain/stiffness. + muscle pain/tenderness. The problem list was updated as a part of today's visit. Patient Active Problem List   Diagnosis Code    Controlled type 2 diabetes mellitus without complication, without long-term current use of insulin ([facility]) E11.9    CKD (chronic kidney disease) stage 3, GFR 30-59 ml/min ([facility]) N18.3    Essential hypertension I10    Elevated PSA R97.20    Benign prostatic hyperplasia with lower urinary tract symptoms N40.1    B12 deficiency E53.8    Diabetic nephropathy associated with type 2 diabetes mellitus ([facility]) E11.21    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 cholecystitis K80.21    Adenomatous polyp of descending colon D12.4    Localized edema R60.0    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     SH:  Social History     Tobacco Use    Smoking status: Never Smoker    Smokeless tobacco: Never Used   Substance Use Topics    Alcohol use: No    Drug use: No       Medications/Allergies:  Current Outpatient Medications on File Prior to Visit   Medication Sig Dispense Refill    SITagliptin (JANUVIA) 50 mg tablet TAKE 1 TABLET BY MOUTH  DAILY 90 Tab 0    insulin glargine (LANTUS SOLOSTAR U-100 INSULIN) 100 unit/mL (3 mL) inpn INJECT SUBCUTANEOUSLY 20 UNITS DAILY 30 mL 0    furosemide (LASIX) 40 mg tablet Take 1 Tab by mouth daily. 90 Tab 1    atorvastatin (LIPITOR) 10 mg tablet Take 1 Tab by mouth daily. 90 Tab 3    tamsulosin (FLOMAX) 0.4 mg capsule TAKE 1 CAPSULE BY MOUTH  DAILY AFTER DINNER 90 Cap 3    hydrALAZINE (APRESOLINE) 100 mg tablet TAKE 1 TABLET BY MOUTH 3  TIMES DAILY 270 Tab 1    nebivolol (BYSTOLIC) 5 mg tablet Take 1 Tab by mouth daily. 90 Tab 1    triamcinolone acetonide (KENALOG) 0.1 % topical cream Apply  to affected area two (2) times a day. use thin layer 454 g 3    fluticasone propionate (FLONASE) 50 mcg/actuation nasal spray INHALE 2 SPRAYS IN EACH  NOSTRIL DAILY 16 g 3    sodium bicarbonate 325 mg tablet Take 1 Tab by mouth daily. 90 Tab 0    glimepiride (AMARYL) 1 mg tablet Take 1 Tab by mouth Daily (before breakfast). 90 Tab 0    olopatadine (PATADAY) 0.2 % drop ophthalmic solution Administer 1 Drop to both eyes daily. 7.5 mL 0    glucose blood VI test strips (ONETOUCH ULTRA TEST) strip Test blood glucose bid. E11.9 300 Strip 3    Insulin Needles, Disposable, 31 gauge x 5/16\" ndle Use daily with lantus. e11.65 100 Package 3    albuterol (PROVENTIL VENTOLIN) 2.5 mg /3 mL (0.083 %) nebulizer solution 3 mL by Nebulization route every four (4) hours as needed for Wheezing. 24 Each 1    albuterol (PROVENTIL HFA, VENTOLIN HFA, PROAIR HFA) 90 mcg/actuation inhaler Take 2 Puffs by inhalation every four (4) hours as needed for Wheezing. 1 Inhaler 11    cyanocobalamin 1,000 mcg tablet Take 1,000 mcg by mouth daily.  cholecalciferol (VITAMIN D3) 1,000 unit cap Take  by mouth daily.  vitamin E (AQUA GEMS) 400 unit capsule Take  by mouth daily.  aspirin 81 mg chewable tablet Take 81 mg by mouth daily.  cranberry extract 450 mg tab Take  by mouth.  ascorbic acid, vitamin C, (VITAMIN C) 500 mg tablet Take 1,000 mg by mouth.       OTHER Indications: Osteo Bi-Flex 1 per day       No current facility-administered medications on file prior to visit. Allergies   Allergen Reactions    Norvasc [Amlodipine] Swelling     Patient states NKDA       Objective:  Visit Vitals  /70   Pulse 64   Temp 98.7 °F (37.1 °C) (Oral)   Resp 18   Ht 5' 7\" (1.702 m)   Wt 216 lb (98 kg)   SpO2 96%   BMI 33.83 kg/m²      Constitutional: Well developed, nourished, no distress, alert, obese habitus, nontoxic   Eyes: Conjunctiva normal. PERRL. Eyelids normal.   CV: S1, S2.  RRR. No murmurs/rubs. No edema. Pulm: No abnormalities on inspection. Clear to auscultation bilaterally. No wheezing/rhonchi. Normal effort. GI: Soft, +LLQ tenderness, no rebound or guarding, nondistended. Normal active bowel sounds. +L CVA tenderness   MS: Gait normal.  Joints without deformity/tenderness. No paraspinal muscular tenderness. No sacral tenderness to palpation.

## 2019-11-27 NOTE — PROGRESS NOTES
Kam Sanches is a 67 y.o. male (: 1940) presenting to address:    Chief Complaint   Patient presents with    Back Pain    Diarrhea     x 5 weeks        Vitals:    19 0744   BP: 134/70   Pulse: 64   Resp: 18   Temp: 98.7 °F (37.1 °C)   TempSrc: Oral   SpO2: 96%   Weight: 216 lb (98 kg)   Height: 5' 7\" (1.702 m)   PainSc:   8   PainLoc: Back       Hearing/Vision:   No exam data present    Learning Assessment:     Learning Assessment 2017   PRIMARY LEARNER Patient   HIGHEST LEVEL OF EDUCATION - PRIMARY LEARNER  4 YEARS OF COLLEGE   BARRIERS PRIMARY LEARNER NONE   CO-LEARNER CAREGIVER No   PRIMARY LANGUAGE ENGLISH    NEED No   LEARNER PREFERENCE PRIMARY VIDEOS   ANSWERED BY self   RELATIONSHIP SELF     Depression Screening:     3 most recent PHQ Screens 2019   Little interest or pleasure in doing things Not at all   Feeling down, depressed, irritable, or hopeless Not at all   Total Score PHQ 2 0     Fall Risk Assessment:     Fall Risk Assessment, last 12 mths 2019   Able to walk? Yes   Fall in past 12 months? Yes   Fall with injury? Yes   Number of falls in past 12 months 1   Fall Risk Score 2     Abuse Screening:     Abuse Screening Questionnaire 2019   Do you ever feel afraid of your partner? N   Are you in a relationship with someone who physically or mentally threatens you? N   Is it safe for you to go home? Y     Coordination of Care Questionaire:   1. Have you been to the ER, urgent care clinic since your last visit? Hospitalized since your last visit? YES urgent care in [place]     2. Have you seen or consulted any other health care providers outside of the [address] [place] [address] since your last visit? Include any pap smears or colon screening. NO    Advanced Directive:   1. Do you have an Advanced Directive?yes     2. Would you like information on Advanced Directives?  NO

## 2019-11-28 LAB — WBC #/AREA STL HPF: NORMAL /HPF

## 2019-11-29 LAB
BACTERIA SPEC CULT: NORMAL
SERVICE CMNT-IMP: NORMAL

## 2019-11-30 LAB
BACTERIA SPEC CULT: NORMAL
SERVICE CMNT-IMP: NORMAL

## 2019-12-02 DIAGNOSIS — R10.32 LEFT LOWER QUADRANT ABDOMINAL PAIN: ICD-10-CM

## 2019-12-02 DIAGNOSIS — R19.7 DIARRHEA OF PRESUMED INFECTIOUS ORIGIN: ICD-10-CM

## 2019-12-02 DIAGNOSIS — R10.9 FLANK PAIN: Primary | ICD-10-CM

## 2019-12-03 LAB — LACTOFERRIN, LCTFLT: 1.4 UG/ML(G) (ref 0–7.24)

## 2019-12-09 DIAGNOSIS — R10.9 FLANK PAIN: Primary | ICD-10-CM

## 2019-12-09 DIAGNOSIS — N28.89 KIDNEY MASS: ICD-10-CM

## 2019-12-16 ENCOUNTER — OFFICE VISIT (OUTPATIENT)
Dept: FAMILY MEDICINE CLINIC | Age: 79
End: 2019-12-16

## 2019-12-16 VITALS
HEART RATE: 65 BPM | TEMPERATURE: 97 F | BODY MASS INDEX: 34.53 KG/M2 | OXYGEN SATURATION: 96 % | RESPIRATION RATE: 18 BRPM | SYSTOLIC BLOOD PRESSURE: 149 MMHG | DIASTOLIC BLOOD PRESSURE: 62 MMHG | WEIGHT: 220 LBS | HEIGHT: 67 IN

## 2019-12-16 DIAGNOSIS — R10.9 FLANK PAIN: Primary | ICD-10-CM

## 2019-12-16 DIAGNOSIS — M54.9 MUSCULOSKELETAL BACK PAIN: ICD-10-CM

## 2019-12-16 NOTE — PATIENT INSTRUCTIONS
Low Back Pain: Exercises Introduction Here are some examples of exercises for you to try. The exercises may be suggested for a condition or for rehabilitation. Start each exercise slowly. Ease off the exercises if you start to have pain. You will be told when to start these exercises and which ones will work best for you. How to do the exercises Press-up 1. Lie on your stomach, supporting your body with your forearms. 2. Press your elbows down into the floor to raise your upper back. As you do this, relax your stomach muscles and allow your back to arch without using your back muscles. As your press up, do not let your hips or pelvis come off the floor. 3. Hold for 15 to 30 seconds, then relax. 4. Repeat 2 to 4 times. Alternate arm and leg (bird dog) exercise 1. Start on the floor, on your hands and knees. 2. Tighten your belly muscles. 3. Raise one leg off the floor, and hold it straight out behind you. Be careful not to let your hip drop down, because that will twist your trunk. 4. Hold for about 6 seconds, then lower your leg and switch to the other leg. 5. Repeat 8 to 12 times on each leg. 6. Over time, work up to holding for 10 to 30 seconds each time. 7. If you feel stable and secure with your leg raised, try raising the opposite arm straight out in front of you at the same time. Knee-to-chest exercise 1. Lie on your back with your knees bent and your feet flat on the floor. 2. Bring one knee to your chest, keeping the other foot flat on the floor (or keeping the other leg straight, whichever feels better on your lower back). 3. Keep your lower back pressed to the floor. Hold for at least 15 to 30 seconds. 4. Relax, and lower the knee to the starting position. 5. Repeat with the other leg. Repeat 2 to 4 times with each leg. 6. To get more stretch, put your other leg flat on the floor while pulling your knee to your chest. 
 
Curl-ups 1. Lie on the floor on your back with your knees bent at a 90-degree angle. Your feet should be flat on the floor, about 12 inches from your buttocks. 2. Cross your arms over your chest. If this bothers your neck, try putting your hands behind your neck (not your head), with your elbows spread apart. 3. Slowly tighten your belly muscles and raise your shoulder blades off the floor. 4. Keep your head in line with your body, and do not press your chin to your chest. 
5. Hold this position for 1 or 2 seconds, then slowly lower yourself back down to the floor. 6. Repeat 8 to 12 times. Pelvic tilt exercise 1. Lie on your back with your knees bent. 2. \"Brace\" your stomach. This means to tighten your muscles by pulling in and imagining your belly button moving toward your spine. You should feel like your back is pressing to the floor and your hips and pelvis are rocking back. 3. Hold for about 6 seconds while you breathe smoothly. 4. Repeat 8 to 12 times. Heel dig bridging 1. Lie on your back with both knees bent and your ankles bent so that only your heels are digging into the floor. Your knees should be bent about 90 degrees. 2. Then push your heels into the floor, squeeze your buttocks, and lift your hips off the floor until your shoulders, hips, and knees are all in a straight line. 3. Hold for about 6 seconds as you continue to breathe normally, and then slowly lower your hips back down to the floor and rest for up to 10 seconds. 4. Do 8 to 12 repetitions. Hamstring stretch in doorway 1. Lie on your back in a doorway, with one leg through the open door. 2. Slide your leg up the wall to straighten your knee. You should feel a gentle stretch down the back of your leg. 3. Hold the stretch for at least 15 to 30 seconds. Do not arch your back, point your toes, or bend either knee. Keep one heel touching the floor and the other heel touching the wall. 4. Repeat with your other leg. 5. Do 2 to 4 times for each leg. Hip flexor stretch 1. Kneel on the floor with one knee bent and one leg behind you. Place your forward knee over your foot. Keep your other knee touching the floor. 2. Slowly push your hips forward until you feel a stretch in the upper thigh of your rear leg. 3. Hold the stretch for at least 15 to 30 seconds. Repeat with your other leg. 4. Do 2 to 4 times on each side. Wall sit 1. Stand with your back 10 to 12 inches away from a wall. 2. Lean into the wall until your back is flat against it. 3. Slowly slide down until your knees are slightly bent, pressing your lower back into the wall. 4. Hold for about 6 seconds, then slide back up the wall. 5. Repeat 8 to 12 times. Follow-up care is a key part of your treatment and safety. Be sure to make and go to all appointments, and call your doctor if you are having problems. It's also a good idea to know your test results and keep a list of the medicines you take. Where can you learn more? Go to http://maverick-frederick.info/. Enter F103 in the search box to learn more about \"Low Back Pain: Exercises. \" Current as of: June 26, 2019 Content Version: 12.2 © 7597-6034 [employer]. Care instructions adapted under license by [email or web address] (which disclaims liability or warranty for this information). If you have questions about a medical condition or this instruction, always ask your healthcare professional. [place] [address] any warranty or liability for your use of this information.

## 2019-12-16 NOTE — PROGRESS NOTES
Assessment/Plan:    1. Flank pain- suspect muskuloskeletal etiology given benign findings on CT. Home stretches. Monitor for 2 more weeks. The plan was discussed with the patient. The patient verbalized understanding and is in agreement with the plan. All medication potential side effects were discussed with the patient. Health Maintenance:   Health Maintenance   Topic Date Due    GLAUCOMA SCREENING Q2Y  01/18/2020    EYE EXAM RETINAL OR DILATED  01/18/2020    FOOT EXAM Q1  02/05/2020    HEMOGLOBIN A1C Q6M  03/04/2020    LIPID PANEL Q1  05/01/2020    MEDICARE YEARLY EXAM  05/07/2020    MICROALBUMIN Q1  05/07/2020    DTaP/Tdap/Td series (2 - Td) 05/19/2027    Shingrix Vaccine Age 50>  Completed    Influenza Age 5 to Adult  Completed    Pneumococcal 65+ years  Completed       Selena Lovell is a 67 y.o. male and presents with Diabetes (follow up)     Subjective:  Pt c/o ongoing L flank pain with radiation toward abdomen. Pain is intermittent. We did CT which showed \"kidney mass\". U/s only showed simple cysts. This all stems from a fall onto cement where he fell onto coccyx 4 weeks ago. He does endorse constipation. CT also showed mesenteric lymph nodes, which needs f/u CT in 3 mos. (had diarrhea for 5 weeks prior to this). ROS:  Constitutional: No recent weight change. No weakness/fatigue. No f/c. Cardiovascular: No CP/palpitations. No TORO/orthopnea/PND. Respiratory: No cough/sputum, dyspnea, wheezing. Gastointestinal: No dysphagia, reflux. No n/v. No constipation/diarrhea. No melena/rectal bleeding. Musculoskeletal: No joint pain/stiffness. No muscle pain/tenderness. Neurological: No seizures/numbness/weakness. No paresthesias. The problem list was updated as a part of today's visit.   Patient Active Problem List   Diagnosis Code    Controlled type 2 diabetes mellitus without complication, without long-term current use of insulin ([facility]) E11.9    CKD (chronic kidney disease) stage 3, GFR 30-59 ml/min ([facility]) N18.3    Essential hypertension I10    Elevated PSA R97.20    Benign prostatic hyperplasia with lower urinary tract symptoms N40.1    B12 deficiency E53.8    Diabetic nephropathy associated with type 2 diabetes mellitus (HCC) E11.21    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 cholecystitis K80.21    Adenomatous polyp of descending colon D12.4    Localized edema R60.0    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 SH:  Social History     Tobacco Use    Smoking status: Never Smoker    Smokeless tobacco: Never Used   Substance Use Topics    Alcohol use: No    Drug use: No       Medications/Allergies:  Current Outpatient Medications on File Prior to Visit   Medication Sig Dispense Refill    SITagliptin (JANUVIA) 50 mg tablet TAKE 1 TABLET BY MOUTH  DAILY 90 Tab 0    insulin glargine (LANTUS SOLOSTAR U-100 INSULIN) 100 unit/mL (3 mL) inpn INJECT SUBCUTANEOUSLY 20  UNITS DAILY 30 mL 0    furosemide (LASIX) 40 mg tablet Take 1 Tab by mouth daily. 90 Tab 1    atorvastatin (LIPITOR) 10 mg tablet Take 1 Tab by mouth daily. 90 Tab 3    tamsulosin (FLOMAX) 0.4 mg capsule TAKE 1 CAPSULE BY MOUTH  DAILY AFTER DINNER 90 Cap 3    hydrALAZINE (APRESOLINE) 100 mg tablet TAKE 1 TABLET BY MOUTH 3  TIMES DAILY 270 Tab 1    nebivolol (BYSTOLIC) 5 mg tablet Take 1 Tab by mouth daily. 90 Tab 1    triamcinolone acetonide (KENALOG) 0.1 % topical cream Apply  to affected area two (2) times a day. use thin layer 454 g 3    fluticasone propionate (FLONASE) 50 mcg/actuation nasal spray INHALE 2 SPRAYS IN EACH  NOSTRIL DAILY 16 g 3    sodium bicarbonate 325 mg tablet Take 1 Tab by mouth daily. 90 Tab 0    glimepiride (AMARYL) 1 mg tablet Take 1 Tab by mouth Daily (before breakfast).  90 Tab 0    olopatadine (PATADAY) 0.2 % drop ophthalmic solution Administer 1 Drop to both eyes daily. 7.5 mL 0    glucose blood VI test strips (ONETOUCH ULTRA TEST) strip Test blood glucose bid. E11.9 300 Strip 3    Insulin Needles, Disposable, 31 gauge x 5/16\" ndle Use daily with lantus. e11.65 100 Package 3    albuterol (PROVENTIL VENTOLIN) 2.5 mg /3 mL (0.083 %) nebulizer solution 3 mL by Nebulization route every four (4) hours as needed for Wheezing. 24 Each 1    albuterol (PROVENTIL HFA, VENTOLIN HFA, PROAIR HFA) 90 mcg/actuation inhaler Take 2 Puffs by inhalation every four (4) hours as needed for Wheezing. 1 Inhaler 11    cyanocobalamin 1,000 mcg tablet Take 1,000 mcg by mouth daily.  cholecalciferol (VITAMIN D3) 1,000 unit cap Take  by mouth daily.  vitamin E (AQUA GEMS) 400 unit capsule Take  by mouth daily.  aspirin 81 mg chewable tablet Take 81 mg by mouth daily.  cranberry extract 450 mg tab Take  by mouth.  ascorbic acid, vitamin C, (VITAMIN C) 500 mg tablet Take 1,000 mg by mouth.  OTHER Indications: Osteo Bi-Flex 1 per day       No current facility-administered medications on file prior to visit. Allergies   Allergen Reactions    Norvasc [Amlodipine] Swelling     Patient states NKDA       Objective:  Visit Vitals  /62 (BP 1 Location: Left arm, BP Patient Position: Sitting)   Pulse 65   Temp 97 °F (36.1 °C) (Oral)   Resp 18   Ht 5' 7\" (1.702 m)   Wt 220 lb (99.8 kg)   SpO2 96%   BMI 34.46 kg/m²      Constitutional: Well developed, nourished, no distress, alert, obese habitus   CV: S1, S2.  RRR. No murmurs/rubs. No edema. Pulm: No abnormalities on inspection. Clear to auscultation bilaterally. No wheezing/rhonchi. Normal effort. GI: Soft, nontender, nondistended. Normal active bowel sounds. Mild LLQ tenderness. MS: Gait normal.  Joints without deformity/tenderness. Strength intact bilateral upper and lower ext. Normal ROM all extremities. Mild pain over lateral lumbar muscles. No pain with rotation or flexion.

## 2019-12-16 NOTE — PROGRESS NOTES
Mary Calle is a 67 y.o. male (: 1940) presenting to address:    Chief Complaint   Patient presents with    Diabetes     follow up       Vitals:    19 0841   BP: 149/62   Pulse: 65   Resp: 18   Temp: 97 °F (36.1 °C)   TempSrc: Oral   SpO2: 96%   Weight: 220 lb (99.8 kg)   Height: 5' 7\" (1.702 m)   PainSc:   4   PainLoc: Back       Hearing/Vision:   No exam data present    Learning Assessment:     Learning Assessment 2017   PRIMARY LEARNER Patient   HIGHEST LEVEL OF EDUCATION - PRIMARY LEARNER  4 YEARS OF COLLEGE   BARRIERS PRIMARY LEARNER NONE   CO-LEARNER CAREGIVER No   PRIMARY LANGUAGE ENGLISH    NEED No   LEARNER PREFERENCE PRIMARY VIDEOS   ANSWERED BY self   RELATIONSHIP SELF     Depression Screening:     3 most recent PHQ Screens 2019   Little interest or pleasure in doing things Not at all   Feeling down, depressed, irritable, or hopeless Not at all   Total Score PHQ 2 0     Fall Risk Assessment:     Fall Risk Assessment, last 12 mths 2019   Able to walk? Yes   Fall in past 12 months? Yes   Fall with injury? Yes   Number of falls in past 12 months 1   Fall Risk Score 2     Abuse Screening:     Abuse Screening Questionnaire 2019   Do you ever feel afraid of your partner? N   Are you in a relationship with someone who physically or mentally threatens you? N   Is it safe for you to go home? Y     Coordination of Care Questionaire:   1. Have you been to the ER, urgent care clinic since your last visit? Hospitalized since your last visit? YES    2. Have you seen or consulted any other health care providers outside of the [address] [place] [address] since your last visit? Include any pap smears or colon screening. NO    Advanced Directive:   1. Do you have an Advanced Directive? YES    2. Would you like information on Advanced Directives?  NO

## 2019-12-26 DIAGNOSIS — R10.9 FLANK PAIN: ICD-10-CM

## 2019-12-26 DIAGNOSIS — N28.89 KIDNEY MASS: ICD-10-CM

## 2020-01-09 RX ORDER — FUROSEMIDE 40 MG/1
40 TABLET ORAL DAILY
Qty: 30 TAB | Refills: 0 | Status: SHIPPED | OUTPATIENT
Start: 2020-01-09 | End: 2020-05-28

## 2020-01-21 RX ORDER — BENZONATATE 200 MG/1
200 CAPSULE ORAL
Qty: 30 CAP | Refills: 0 | Status: SHIPPED | OUTPATIENT
Start: 2020-01-21 | End: 2020-09-11 | Stop reason: ALTCHOICE

## 2020-01-24 ENCOUNTER — IMPORTED ENCOUNTER (OUTPATIENT)
Dept: URBAN - METROPOLITAN AREA CLINIC 1 | Facility: CLINIC | Age: 80
End: 2020-01-24

## 2020-01-24 PROBLEM — H40.013: Noted: 2020-01-24

## 2020-01-24 PROBLEM — H00.12: Noted: 2020-01-24

## 2020-01-24 PROBLEM — H01.004: Noted: 2020-01-24

## 2020-01-24 PROBLEM — H01.001: Noted: 2020-01-24

## 2020-01-24 PROBLEM — H25.813: Noted: 2020-01-24

## 2020-01-24 PROCEDURE — 92133 CPTRZD OPH DX IMG PST SGM ON: CPT

## 2020-01-24 PROCEDURE — 92012 INTRM OPH EXAM EST PATIENT: CPT

## 2020-01-24 NOTE — PATIENT DISCUSSION
1.  Glaucoma Suspect OU (CD 0.80/0.65)  Neg Fm Hx. Risk of cupping. IOP WNL OU today on no gtts; OCT WNL OU Today. Cont to observe off gtts; 2. Hordeolum RLL (New) - Begin Hot compresses TID x 5 minutes for 3 weeks. If without improvement discussed with patient possible Incision and Drainage procedure. Risks and benefits discussed with patient and patient states full understanding. 3. Anterior Blepharitis OU - Begin Daily Hot compresses and lid scrubs were recommended. 4. Allergic Conjunctivitis OU -- Cont Zaditor BID OU PRN. 5.  Cataract OU: After discussion patient wishes to hold on scheduling Phaco until the Summer. 6.  H/o DM Type II (Insulin) Return for an appointment in 1 mo 10 (check hordeolum RLL) with Dr. Claudeen Cobble.

## 2020-02-24 ENCOUNTER — IMPORTED ENCOUNTER (OUTPATIENT)
Dept: URBAN - METROPOLITAN AREA CLINIC 1 | Facility: CLINIC | Age: 80
End: 2020-02-24

## 2020-02-24 PROBLEM — H00.012: Noted: 2020-02-24

## 2020-02-24 PROCEDURE — 99213 OFFICE O/P EST LOW 20 MIN: CPT

## 2020-02-24 NOTE — PATIENT DISCUSSION
1.  Hordeolum RLL - Improved. Cont Daily Hot compresses for routine maintenance. If without improvement discussed with patient possible Incision and Drainage procedure. Risks and benefits discussed with patient and patient states full understanding. 2. Anterior Blepharitis OU - Begin Daily Hot compresses and lid scrubs were recommended. 3. Glaucoma Suspect OU (CD 0.80/0.65)  Neg Fm Hx. Risk of cupping. 4.  Allergic Conjunctivitis OU -- Cont Zaditor BID OU PRN. 5.  Cataract OU: After discussion patient wishes to hold on scheduling Phaco until the Summer. 6.  H/o DM Type II (Insulin)Return for an appointment in June 30/wilder with Dr. Gifty Vance.

## 2020-02-24 NOTE — PATIENT DISCUSSION
Anterior Blepharitis OU - Begin Daily Hot compresses and lid scrubs were recommended. 3. Glaucoma Suspect OU (CD 0.80/0.65)  Neg Fm Hx. Risk of cupping. IOP WNL OU today on no gtts; OCT WNL OU Today. Cont to observe off gtts; 4. Allergic Conjunctivitis OU -- Cont Zaditor BID OU PRN. 5.  Cataract OU: After discussion patient wishes to hold on scheduling Phaco until the Summer.  6.  H/o DM Type II (Insulin)

## 2020-03-11 DIAGNOSIS — E11.22 TYPE 2 DIABETES MELLITUS WITH STAGE 3 CHRONIC KIDNEY DISEASE, WITHOUT LONG-TERM CURRENT USE OF INSULIN (HCC): ICD-10-CM

## 2020-03-11 DIAGNOSIS — N18.30 TYPE 2 DIABETES MELLITUS WITH STAGE 3 CHRONIC KIDNEY DISEASE, WITHOUT LONG-TERM CURRENT USE OF INSULIN (HCC): ICD-10-CM

## 2020-03-11 RX ORDER — PEN NEEDLE, DIABETIC 30 GX3/16"
NEEDLE, DISPOSABLE MISCELLANEOUS
Qty: 90 PEN NEEDLE | Refills: 3 | Status: SHIPPED | OUTPATIENT
Start: 2020-03-11 | End: 2020-12-08 | Stop reason: SDUPTHER

## 2020-04-21 DIAGNOSIS — I10 ESSENTIAL HYPERTENSION: ICD-10-CM

## 2020-04-21 RX ORDER — HYDRALAZINE HYDROCHLORIDE 100 MG/1
TABLET, FILM COATED ORAL
Qty: 270 TAB | Refills: 1 | Status: SHIPPED | OUTPATIENT
Start: 2020-04-21 | End: 2020-09-11 | Stop reason: SDUPTHER

## 2020-05-11 DIAGNOSIS — E11.22 TYPE 2 DIABETES MELLITUS WITH STAGE 3 CHRONIC KIDNEY DISEASE, WITHOUT LONG-TERM CURRENT USE OF INSULIN (HCC): ICD-10-CM

## 2020-05-11 DIAGNOSIS — N18.30 TYPE 2 DIABETES MELLITUS WITH STAGE 3 CHRONIC KIDNEY DISEASE, WITHOUT LONG-TERM CURRENT USE OF INSULIN (HCC): ICD-10-CM

## 2020-05-11 RX ORDER — INSULIN GLARGINE 100 [IU]/ML
INJECTION, SOLUTION SUBCUTANEOUS
Qty: 30 ML | Refills: 0 | Status: SHIPPED | OUTPATIENT
Start: 2020-05-11 | End: 2020-10-07

## 2020-05-27 DIAGNOSIS — I10 ESSENTIAL HYPERTENSION: ICD-10-CM

## 2020-05-27 RX ORDER — NEBIVOLOL HYDROCHLORIDE 5 MG/1
TABLET ORAL
Qty: 90 TAB | Refills: 1 | Status: SHIPPED | OUTPATIENT
Start: 2020-05-27 | End: 2020-09-11 | Stop reason: SDUPTHER

## 2020-06-08 ENCOUNTER — IMPORTED ENCOUNTER (OUTPATIENT)
Dept: URBAN - METROPOLITAN AREA CLINIC 1 | Facility: CLINIC | Age: 80
End: 2020-06-08

## 2020-06-08 PROBLEM — Z79.4: Noted: 2020-06-08

## 2020-06-08 PROBLEM — H01.001: Noted: 2020-06-08

## 2020-06-08 PROBLEM — H40.013: Noted: 2020-06-08

## 2020-06-08 PROBLEM — E11.9: Noted: 2020-06-08

## 2020-06-08 PROBLEM — H01.004: Noted: 2020-06-08

## 2020-06-08 PROBLEM — H25.813: Noted: 2020-06-08

## 2020-06-08 PROCEDURE — 92014 COMPRE OPH EXAM EST PT 1/>: CPT

## 2020-06-08 PROCEDURE — 92015 DETERMINE REFRACTIVE STATE: CPT

## 2020-06-08 NOTE — PATIENT DISCUSSION
1.  DM Type II without sign of diabetic retinopathy and no blot heme on dilated retinal examination today OU No Macular Edema:  Discussed the pathophysiology of diabetes and its effect on the eye and risk of blindness. Stressed the importance of strong glucose control. Advised of importance of at least yearly dilated examinations but to contact us immediately for any problems or concerns. 2. Type II Insulin dependent diabetes mellitus3. Cataract OU:  Visually Significant discussed the risks benefits alternatives and limitations of cataract surgery. The patient stated a full understanding and a desire to proceed with the procedure. The patient will need to return for preop appointment with cataract measurements and to have any additional questions answered and start pre-operative eye drops as directed. Phaco PCL OS then OD. Pt wants to schedule in September. Will need dilated exam at [facility]4. Glaucoma Suspect OU :  Patient is considered Low Risk. Condition was discussed with patient and patient understands. Will continue to monitor patient for any progression in condition. Patient was advised to call us with any problems questions or concerns. 5.  Anterior Blepharitis OU - Daily Hot compresses and lid scrubs were recommended. 6. Return for an appointment for 3 mos A scan HP with Dr. Domingo Rivera.

## 2020-06-08 NOTE — PATIENT DISCUSSION
Cataract OU:  Visually Significant discussed the risks benefits alternatives and limitations of cataract surgery. The patient stated a full understanding and a desire to proceed with the procedure. The patient will need to return for preop appointment with cataract measurements and to have any additional questions answered and start pre-operative eye drops as directed. Phaco PCL OS then OD. Pt wants to schedule in September.   Will need dilated exam at [facility]

## 2020-08-20 RX ORDER — FLUTICASONE PROPIONATE 50 MCG
SPRAY, SUSPENSION (ML) NASAL
Qty: 16 G | Refills: 3 | Status: SHIPPED | OUTPATIENT
Start: 2020-08-20 | End: 2021-04-27 | Stop reason: SDUPTHER

## 2020-08-21 RX ORDER — TAMSULOSIN HYDROCHLORIDE 0.4 MG/1
CAPSULE ORAL
Qty: 90 CAP | Refills: 3 | Status: SHIPPED | OUTPATIENT
Start: 2020-08-21 | End: 2021-07-14 | Stop reason: SDUPTHER

## 2020-08-21 RX ORDER — ATORVASTATIN CALCIUM 10 MG/1
TABLET, FILM COATED ORAL
Qty: 90 TAB | Refills: 3 | Status: SHIPPED | OUTPATIENT
Start: 2020-08-21 | End: 2021-07-14 | Stop reason: SDUPTHER

## 2020-08-27 NOTE — PATIENT DISCUSSION
POSTERIOR CAPSULAR FIBROSIS, OU : NOT VISUALLY SIGNIFICANT. FOLLOW. PATIENT NOT COMPLAINING OF GLARE.

## 2020-09-02 DIAGNOSIS — E11.9 CONTROLLED TYPE 2 DIABETES MELLITUS WITHOUT COMPLICATION, WITHOUT LONG-TERM CURRENT USE OF INSULIN (HCC): Primary | ICD-10-CM

## 2020-09-02 DIAGNOSIS — N18.30 CKD (CHRONIC KIDNEY DISEASE) STAGE 3, GFR 30-59 ML/MIN (HCC): ICD-10-CM

## 2020-09-02 DIAGNOSIS — I10 ESSENTIAL HYPERTENSION: ICD-10-CM

## 2020-09-02 DIAGNOSIS — E53.8 B12 DEFICIENCY: ICD-10-CM

## 2020-09-08 ENCOUNTER — TELEPHONE (OUTPATIENT)
Dept: FAMILY MEDICINE CLINIC | Age: 80
End: 2020-09-08

## 2020-09-08 DIAGNOSIS — K80.20 CALCULUS OF GALLBLADDER WITHOUT CHOLECYSTITIS WITHOUT OBSTRUCTION: Primary | ICD-10-CM

## 2020-09-08 NOTE — TELEPHONE ENCOUNTER
Pt's wife called stating that the patient was given pain medication from the hospital and wants to know if its okay to take due to him being a diabetic.  Please advise    Best Contact [phone]

## 2020-09-08 NOTE — TELEPHONE ENCOUNTER
Spoke to spouse. She is going to stop giving pt her tylenol with codeine and give him the tylenol 500 mg prescribed for him. He also will try the Bentyl.

## 2020-09-09 ENCOUNTER — TELEPHONE (OUTPATIENT)
Dept: FAMILY MEDICINE CLINIC | Age: 80
End: 2020-09-09

## 2020-09-09 ENCOUNTER — DOCUMENTATION ONLY (OUTPATIENT)
Dept: SURGERY | Age: 80
End: 2020-09-09

## 2020-09-09 NOTE — TELEPHONE ENCOUNTER
Pt wife is calling regarding pain. Pt was seen in the hospital for his gallbladder.    Future Appointments   Date Time Provider Chrissy Isabelle   9/10/2020 10:30 AM Abiel Plata MD [facility], [place]     In the meantime what can the pt take for pt until he is seen tomorrow

## 2020-09-09 NOTE — TELEPHONE ENCOUNTER
I returned spouse call. Wanted to make sure it was ok to take the tylenol 500 mg and bentyl the hospital prescribed. Yes, she can give that to him. She was also giving him her tylenol with codeine. Asked her to stop that since it was not prescribed for him. She verbalized understanding.

## 2020-09-10 ENCOUNTER — OFFICE VISIT (OUTPATIENT)
Dept: SURGERY | Age: 80
End: 2020-09-10

## 2020-09-10 VITALS
HEIGHT: 67 IN | RESPIRATION RATE: 18 BRPM | DIASTOLIC BLOOD PRESSURE: 62 MMHG | OXYGEN SATURATION: 96 % | HEART RATE: 56 BPM | WEIGHT: 221 LBS | BODY MASS INDEX: 34.69 KG/M2 | TEMPERATURE: 97.7 F | SYSTOLIC BLOOD PRESSURE: 140 MMHG

## 2020-09-10 DIAGNOSIS — R10.11 RIGHT UPPER QUADRANT ABDOMINAL PAIN: ICD-10-CM

## 2020-09-10 DIAGNOSIS — R10.9 RIGHT FLANK PAIN: ICD-10-CM

## 2020-09-10 DIAGNOSIS — R10.31 RIGHT LOWER QUADRANT ABDOMINAL PAIN: Primary | ICD-10-CM

## 2020-09-10 NOTE — PROGRESS NOTES
Juan Prince is a 79 y.o. male  Chief Complaint   Patient presents with    New Patient     gallstones     HPI: Juan Prince is a 79 y.o. male presenting with chief complain of RUQ pain    Past Medical History:   Diagnosis Date    BPH with obstruction/lower urinary tract symptoms     Calculus of kidney     Chronic kidney disease     Diabetes ([address]     Elevated PSA     Hypercholesterolemia     Hypertension        Past Surgical History:   Procedure Laterality Date    HX APPENDECTOMY      16years old     HX COLONOSCOPY  09/10/2014    HX ORTHOPAEDIC  1970s    r shoulder    HX UROLOGICAL  02/06/2017    Prostate Bx: HGPIN left lateral mid. Dr. Padmini Sawant @ [facility].         Family History   Problem Relation Age of Onset    Kidney Disease Mother     Stroke Father        Social History     Socioeconomic History    Marital status:      Spouse name: Not on file    Number of children: Not on file    Years of education: Not on file    Highest education level: Not on file   Tobacco Use    Smoking status: Never Smoker    Smokeless tobacco: Never Used   Substance and Sexual Activity    Alcohol use: No    Drug use: No    Sexual activity: Never         Outpatient Medications Marked as Taking for the 9/10/20 encounter (Office Visit) with Scottie Solano MD   Medication Sig Dispense Refill    SITagliptin (Januvia) 50 mg tablet TAKE 1 TABLET BY MOUTH  DAILY 90 Tab 0    tamsulosin (FLOMAX) 0.4 mg capsule TAKE 1 CAPSULE BY MOUTH  DAILY AFTER DINNER 90 Cap 3    atorvastatin (LIPITOR) 10 mg tablet TAKE 1 TABLET BY MOUTH  DAILY 90 Tab 3    fluticasone propionate (FLONASE) 50 mcg/actuation nasal spray USE 2 SPRAYS IN EACH  NOSTRIL DAILY 16 g 3    furosemide (LASIX) 40 mg tablet TAKE 1 TABLET BY MOUTH  DAILY 90 Tab 3    Bystolic 5 mg tablet TAKE 1 TABLET BY MOUTH  DAILY 90 Tab 1    insulin glargine (Lantus Solostar U-100 Insulin) 100 unit/mL (3 mL) inpn INJECT SUBCUTANEOUSLY 20 UNITS DAILY 30 mL 0    Insulin Needles, Disposable, 31 gauge x 5/16\" ndle USE DAILY WITH LANTUS 90 Pen Needle 3    benzonatate (TESSALON) 200 mg capsule Take 1 Cap by mouth three (3) times daily as needed for Cough. 30 Cap 0    triamcinolone acetonide (KENALOG) 0.1 % topical cream Apply  to affected area two (2) times a day. use thin layer 454 g 3    sodium bicarbonate 325 mg tablet Take 1 Tab by mouth daily. 90 Tab 0    glimepiride (AMARYL) 1 mg tablet Take 1 Tab by mouth Daily (before breakfast). 90 Tab 0    olopatadine (PATADAY) 0.2 % drop ophthalmic solution Administer 1 Drop to both eyes daily. 7.5 mL 0    glucose blood VI test strips (ONETOUCH ULTRA TEST) strip Test blood glucose bid. E11.9 300 Strip 3    albuterol (PROVENTIL VENTOLIN) 2.5 mg /3 mL (0.083 %) nebulizer solution 3 mL by Nebulization route every four (4) hours as needed for Wheezing. 24 Each 1    albuterol (PROVENTIL HFA, VENTOLIN HFA, PROAIR HFA) 90 mcg/actuation inhaler Take 2 Puffs by inhalation every four (4) hours as needed for Wheezing. 1 Inhaler 11    cyanocobalamin 1,000 mcg tablet Take 1,000 mcg by mouth daily.  cholecalciferol (VITAMIN D3) 1,000 unit cap Take  by mouth daily.  vitamin E (AQUA GEMS) 400 unit capsule Take  by mouth daily.  aspirin 81 mg chewable tablet Take 81 mg by mouth daily.  cranberry extract 450 mg tab Take  by mouth.  ascorbic acid, vitamin C, (VITAMIN C) 500 mg tablet Take 1,000 mg by mouth.  OTHER Indications: Osteo Bi-Flex 1 per day         Allergies   Allergen Reactions    Norvasc [Amlodipine] Swelling     Patient states NKDA       Vitals:    09/10/20 1102   BP: 140/62   Pulse: (!) 56   Resp: 18   Temp: 97.7 °F (36.5 °C)   SpO2: 96%   Weight: 221 lb (100.2 kg)   Height: 5' 7\" (1.702 m)   PainSc:   7           The diagnoses and plan were discussed with the patient. All questions answered. Plan of care agreed to by all concerned. Is someone accompanying this pt? Yes, wife    Is the patient using any DME equipment during OV? no        Vitals:    09/10/20 1102   BP: 140/62   Pulse: (!) 56   Resp: 18   Temp: 97.7 °F (36.5 °C)   SpO2: 96%   Weight: 221 lb (100.2 kg)   Height: 5' 7\" (1.702 m)        Depression Screening:  3 most recent PHQ Screens 12/16/2019   Little interest or pleasure in doing things Not at all   Feeling down, depressed, irritable, or hopeless Not at all   Total Score PHQ 2 0       Learning Assessment:  Learning Assessment 5/19/2017   PRIMARY LEARNER Patient   HIGHEST LEVEL OF EDUCATION - PRIMARY LEARNER  4 YEARS OF COLLEGE   BARRIERS PRIMARY LEARNER NONE   CO-LEARNER CAREGIVER No   PRIMARY LANGUAGE ENGLISH    NEED No   LEARNER PREFERENCE PRIMARY VIDEOS   ANSWERED BY self   RELATIONSHIP SELF         Fall Risk  Fall Risk Assessment, last 12 mths 12/16/2019   Able to walk? Yes   Fall in past 12 months? Yes   Fall with injury? Yes   Number of falls in past 12 months 1   Fall Risk Score 2       Health Maintenance reviewed and discussed and ordered per Provider.                   ,

## 2020-09-10 NOTE — PROGRESS NOTES
General Surgery Consult    Radha Wasserman  Admit date: (Not on file)    MRN: [id number]     : 1940     Age: 79 y.o. Attending Physician: Roshni Jackson MD, [facility]      History of Present Illness:      Radha Wasserman is a 79 y.o. male who was referred to me for evaluation of a possible gallbladder disease. The patient has stated that he has been having right flank pain for about 2 months now. He said that it November of last year he fell from his chair and he hit his sacral bone and he developed bilateral lower back pain but this has went away completely. He was doing relatively well this year however his wife stated that he has not been feeling well and he has been very weak. He said that his been developing right flank pain with swelling that again has been there for about 2 months. He said that the pain is constant and it is present all the time and there is no relationship between eating and the pain. He denies any nausea or vomiting. He stated that sometimes he has constipation and sometimes loose stool but this has been there for years and he had a colonoscopy last year according to him that was normal.  He said that the swelling in the right flank does not come and goes and it is always out and swollen compared to the left side. His wife did agree with this. He has multiple medical problems and his last CT scan was done without IV contrast because of his kidney disease. The CT scan showed no evidence of any pathology but it showed cholelithiasis which later was ruled out by ultrasound and it turned out that these are small polyps. But the CT scan also showed a right hemorrhagic cyst in the right kidney for which she was referred for urology which he did not see yet.      Patient Active Problem List    Diagnosis Date Noted    Class 1 obesity due to excess calories with serious comorbidity and body mass index (BMI) of 32.0 to 32.9 in adult 2019    RTA (renal tubular acidosis) 08/28/2018    Adenomatous polyp of descending colon 05/21/2018    Localized edema 05/21/2018    Diabetic nephropathy associated with type 2 diabetes mellitus ([facility] 12/21/2017    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 cholecystitis 12/21/2017    B12 deficiency 06/01/2017    Controlled type 2 diabetes mellitus without complication, without long-term current use of insulin ([address] 05/22/2017    CKD (chronic kidney disease) stage 3, GFR 30-59 ml/min ([address] 05/22/2017    Essential hypertension 05/22/2017    Elevated PSA 05/22/2017    Benign prostatic hyperplasia with lower urinary tract symptoms 05/22/2017     Past Medical History:   Diagnosis Date    BPH with obstruction/lower urinary tract symptoms     Calculus of kidney     Chronic kidney disease     Diabetes ([address]     Elevated PSA     Hypercholesterolemia     Hypertension       Past Surgical History:   Procedure Laterality Date    HX APPENDECTOMY      16years old     HX COLONOSCOPY  09/10/2014    HX ORTHOPAEDIC  1970s    r shoulder    HX UROLOGICAL  02/06/2017    Prostate Bx: HGPIN left lateral mid. Dr. Hai Melendrez @ [place] in [facility].        Social History     Tobacco Use    Smoking status: Never Smoker    Smokeless tobacco: Never Used   Substance Use Topics    Alcohol use: No      Social History     Tobacco Use   Smoking Status Never Smoker   Smokeless Tobacco Never Used     Family History   Problem Relation Age of Onset    Kidney Disease Mother     Stroke Father       Current Outpatient Medications   Medication Sig    SITagliptin (Januvia) 50 mg tablet TAKE 1 TABLET BY MOUTH  DAILY    tamsulosin (FLOMAX) 0.4 mg capsule TAKE 1 CAPSULE BY MOUTH  DAILY AFTER DINNER    atorvastatin (LIPITOR) 10 mg tablet TAKE 1 TABLET BY MOUTH  DAILY    fluticasone propionate (FLONASE) 50 mcg/actuation nasal spray USE 2 SPRAYS IN EACH  NOSTRIL DAILY    furosemide (LASIX) 40 mg tablet TAKE 1 TABLET BY MOUTH  DAILY    Bystolic 5 mg tablet TAKE 1 TABLET BY MOUTH  DAILY    insulin glargine (Lantus Solostar U-100 Insulin) 100 unit/mL (3 mL) inpn INJECT SUBCUTANEOUSLY 20  UNITS DAILY    Insulin Needles, Disposable, 31 gauge x 5/16\" ndle USE DAILY WITH LANTUS    benzonatate (TESSALON) 200 mg capsule Take 1 Cap by mouth three (3) times daily as needed for Cough.  triamcinolone acetonide (KENALOG) 0.1 % topical cream Apply  to affected area two (2) times a day. use thin layer    sodium bicarbonate 325 mg tablet Take 1 Tab by mouth daily.  glimepiride (AMARYL) 1 mg tablet Take 1 Tab by mouth Daily (before breakfast).  olopatadine (PATADAY) 0.2 % drop ophthalmic solution Administer 1 Drop to both eyes daily.  glucose blood VI test strips (ONETOUCH ULTRA TEST) strip Test blood glucose bid. E11.9    albuterol (PROVENTIL VENTOLIN) 2.5 mg /3 mL (0.083 %) nebulizer solution 3 mL by Nebulization route every four (4) hours as needed for Wheezing.  albuterol (PROVENTIL HFA, VENTOLIN HFA, PROAIR HFA) 90 mcg/actuation inhaler Take 2 Puffs by inhalation every four (4) hours as needed for Wheezing.  cyanocobalamin 1,000 mcg tablet Take 1,000 mcg by mouth daily.  cholecalciferol (VITAMIN D3) 1,000 unit cap Take  by mouth daily.  vitamin E (AQUA GEMS) 400 unit capsule Take  by mouth daily.  aspirin 81 mg chewable tablet Take 81 mg by mouth daily.  cranberry extract 450 mg tab Take  by mouth.  ascorbic acid, vitamin C, (VITAMIN C) 500 mg tablet Take 1,000 mg by mouth.  OTHER Indications: Osteo Bi-Flex 1 per day    hydrALAZINE (APRESOLINE) 100 mg tablet TAKE 1 TABLET BY MOUTH 3  TIMES DAILY     No current facility-administered medications for this visit.        Allergies   Allergen Reactions    Norvasc [Amlodipine] Swelling     Patient states NKDA        Review of Systems:  Constitutional: positive for fatigue and malaise  Eyes: negative  Ears, Nose, Mouth, Throat, and Face: negative  Respiratory: negative  Cardiovascular: negative  Gastrointestinal: positive for abdominal pain  Genitourinary:negative  Integument/Breast: negative  Hematologic/Lymphatic: negative  Musculoskeletal:negative  Neurological: negative  Behavioral/Psychiatric: negative  Endocrine: negative  Allergic/Immunologic: negative    Objective:     Visit Vitals  /62   Pulse (!) 56   Temp 97.7 °F (36.5 °C)   Resp 18   Ht 5' 7\" (1.702 m)   Wt 100.2 kg (221 lb)   SpO2 96%   BMI 34.61 kg/m²       Physical Exam:      General:   Appears sick but non-toxic, in no respiratory distress and acyanotic, alert, oriented times 3, cooperative and moderately ill   Eyes:  conjunctivae and sclerae normal, pupils equal, round, reactive to light   Throat & Neck: no erythema or exudates noted and neck supple and symmetrical; no palpable masses   Lungs:   clear to auscultation bilaterally   Heart:  Regular rate and rhythm   Abdomen:   rounded, obese and protuberant, soft, nontender, nondistended, no masses or organomegaly. There is some fullness in the right flank area extending little bit toward the abdomen but no right upper quadrant tenderness or guarding. Also on inspection there is clearly a swollen right side than the left side but no evidence of hernia on examination in the right flank . There is also no evidence of any soft tissue masses.     Extremities: extremities normal, atraumatic, no cyanosis or edema   Skin: Normal.       Imaging and Lab Review:     CBC:   Lab Results   Component Value Date/Time    WBC 6.7 11/05/2018 08:23 AM    RBC 4.49 (L) 11/05/2018 08:23 AM    HGB 13.5 11/05/2018 08:23 AM    HCT 42.1 11/05/2018 08:23 AM    PLATELET 237 00/25/9825 08:23 AM     BMP:   Lab Results   Component Value Date/Time    Glucose 212 (H) 09/04/2019 08:14 AM    Sodium 141 09/04/2019 08:14 AM    Potassium 4.7 09/04/2019 08:14 AM    Chloride 105 09/04/2019 08:14 AM    CO2 29 09/04/2019 08:14 AM    BUN 34 (H) 09/04/2019 08:14 AM    Creatinine 1.70 (H) 09/04/2019 08:14 AM    Calcium 8.9 09/04/2019 08:14 AM     CMP:  Lab Results   Component Value Date/Time    Glucose 212 (H) 09/04/2019 08:14 AM    Sodium 141 09/04/2019 08:14 AM    Potassium 4.7 09/04/2019 08:14 AM    Chloride 105 09/04/2019 08:14 AM    CO2 29 09/04/2019 08:14 AM    BUN 34 (H) 09/04/2019 08:14 AM    Creatinine 1.70 (H) 09/04/2019 08:14 AM    Calcium 8.9 09/04/2019 08:14 AM    Anion gap 7 09/04/2019 08:14 AM    BUN/Creatinine ratio 20 09/04/2019 08:14 AM    Alk. phosphatase 68 05/21/2018 08:30 AM    Protein, total 7.1 05/21/2018 08:30 AM    Albumin 4.1 05/21/2018 08:30 AM    Globulin 3.0 05/21/2018 08:30 AM    A-G Ratio 1.4 05/21/2018 08:30 AM       No results found for this or any previous visit (from the past 24 hour(s)). images and reports reviewed    Assessment:   Virginia Elias is a 79 y.o. male who is presenting with an interesting and challenging clinical presentation. I explained to the patient and his wife that his gallbladder is not the reason for his right flank pain and abdominal pain. I explained to them that though the CT scan showed small gallstones, the ultrasound did not show any evidence of cholelithiasis and these are small polyps. And even if he had stones I explained to him that his symptoms are not typical of gallbladder disease. However clearly the patient has swelling on the right flank compared to the left side and I am not sure the reason for that because the CT scan did not show any hernia or mass. I do not know if he has a supple flank hernia or some type of soft tissue mass that was not well seen on the CT scan but his CT scan showed a hemorrhagic cyst in the right kidney which could explain the pain. He was already given the urology contact information so he can make an appointment for his kidney cyst.  They did not make it yet. I explained to them is that for sure he does not need any surgery for his gallbladder.   I told him that he needs to follow-up with the urology first and I offered to order an MRI to better define the reason for the swelling in the right flank which they agreed.      Plan:     Make the appointment with the urology for evaluation of the hemorrhagic cyst in the right kidney to see if this is the reason for the right flank pain  I placed an order for MRI to better define the reason of the swelling in the right flank  Follow-up with Dr. Kelton Jay with me after the results of the MRI    Please call me if you have any questions (cell phone: [phone])     Signed By: Estefani Pizarro MD     September 10, 2020

## 2020-09-11 ENCOUNTER — OFFICE VISIT (OUTPATIENT)
Dept: FAMILY MEDICINE CLINIC | Age: 80
End: 2020-09-11

## 2020-09-11 ENCOUNTER — TELEPHONE (OUTPATIENT)
Dept: FAMILY MEDICINE CLINIC | Age: 80
End: 2020-09-11

## 2020-09-11 VITALS
SYSTOLIC BLOOD PRESSURE: 120 MMHG | HEIGHT: 67 IN | DIASTOLIC BLOOD PRESSURE: 63 MMHG | BODY MASS INDEX: 34.62 KG/M2 | WEIGHT: 220.6 LBS | TEMPERATURE: 97.8 F | HEART RATE: 60 BPM | OXYGEN SATURATION: 97 % | RESPIRATION RATE: 19 BRPM

## 2020-09-11 DIAGNOSIS — M79.2 NEUROPATHIC PAIN: ICD-10-CM

## 2020-09-11 DIAGNOSIS — R19.00 RIGHT FLANK MASS: ICD-10-CM

## 2020-09-11 DIAGNOSIS — I10 ESSENTIAL HYPERTENSION: ICD-10-CM

## 2020-09-11 DIAGNOSIS — E11.9 CONTROLLED TYPE 2 DIABETES MELLITUS WITHOUT COMPLICATION, WITHOUT LONG-TERM CURRENT USE OF INSULIN (HCC): Primary | ICD-10-CM

## 2020-09-11 LAB — HBA1C MFR BLD HPLC: 7.9 %

## 2020-09-11 RX ORDER — HYDRALAZINE HYDROCHLORIDE 100 MG/1
TABLET, FILM COATED ORAL
Qty: 270 TAB | Refills: 1 | Status: SHIPPED | OUTPATIENT
Start: 2020-09-11 | End: 2020-11-23

## 2020-09-11 RX ORDER — GABAPENTIN 300 MG/1
300 CAPSULE ORAL 2 TIMES DAILY
Qty: 60 CAP | Refills: 0 | Status: SHIPPED | OUTPATIENT
Start: 2020-09-11 | End: 2020-10-14 | Stop reason: SDUPTHER

## 2020-09-11 RX ORDER — GLIMEPIRIDE 1 MG/1
1 TABLET ORAL
Qty: 90 TAB | Refills: 0 | Status: SHIPPED | OUTPATIENT
Start: 2020-09-11 | End: 2020-09-24 | Stop reason: SDUPTHER

## 2020-09-11 RX ORDER — ACETAMINOPHEN 500 MG
TABLET ORAL
COMMUNITY

## 2020-09-11 RX ORDER — DICYCLOMINE HYDROCHLORIDE 20 MG/1
20 TABLET ORAL
COMMUNITY
Start: 2020-09-06 | End: 2020-10-15

## 2020-09-11 RX ORDER — NEBIVOLOL 5 MG/1
TABLET ORAL
Qty: 90 TAB | Refills: 1 | Status: SHIPPED | OUTPATIENT
Start: 2020-09-11 | End: 2020-11-23

## 2020-09-11 NOTE — TELEPHONE ENCOUNTER
Pt wife is calling to see if she can get a same day appt for her  regarding the ER visit about his gallbladder. Pt was scheduled yesterday but wife canceled appt.  Wife was offered a v.v. appt but want decline because she wants to pt to be seen in person

## 2020-09-11 NOTE — PROGRESS NOTES
Assessment/Plan:    1. Controlled type 2 diabetes mellitus without complication, without long-term current use of insulin ([address]  -up. Cont current. - AMB POC HEMOGLOBIN A1C    2. Neuropathic pain, R flank mass- pain out of proportion to physical findings except the R flank fullness and largeness. Will start neurontin qhs. Can up to bid if toleratine. Ck xray back and MRI to r/o spinal process. Dr. Erich Michael has ordered MRI abd to r/o hernia. Doubt hemorrhagic cyst is contributing to symptoms.   - gabapentin (NEURONTIN) 300 mg capsule; Take 1 Cap by mouth two (2) times a day. Max Daily Amount: 600 mg. Dispense: 60 Cap; Refill: 0  - XR SPINE THORAC 3 V; Future  - XR SPINE LUMB 2 OR 3 V; Future  - MRI LUMB SPINE WO CONT; Future    A total of 25 minutes was spent with the patient of which25 minutes was spent in counseling regarding sx, ddx, eval, etc.      The plan was discussed with the patient. The patient verbalized understanding and is in agreement with the plan. All medication potential side effects were discussed with the patient. Health Maintenance:   Health Maintenance   Topic Date Due    Foot Exam Q1  02/05/2020    Lipid Screen  05/01/2020    Medicare Yearly Exam  05/07/2020    MICROALBUMIN Q1  05/07/2020    Flu Vaccine (1) 09/01/2020    GLAUCOMA SCREENING Q2Y  06/08/2022    Eye Exam Retinal or Dilated  06/08/2022    DTaP/Tdap/Td series (2 - Td) 05/19/2027    Shingrix Vaccine Age 50>  Completed    Pneumococcal 65+ years  Completed       Leopoldo Dill is a 79 y.o. male and presents with Abdominal Pain     Subjective:    Pt c/o R abd pain x 2 mos. This is the side he normally injects insulin. He changed site of insulin injection w/o improvement. Notes swelling on the R lateral abdomen. It's exquisitely tender to light touch. Feels like needles and knives. No constipation/diarrhea.  +nausea. Pain is constant. I reviewed records from [address]   Labs show mild incr in lipase, but no issues on CT. U/a with proteinuria (chronic). Glucose high. Kidney function stable. dm2 - not eating much. Not checking bs. ROS:  Constitutional: No recent weight change. No weakness/fatigue. No f/c. HENT: No HA, dizziness. No hearing loss/tinnitus. No nasal congestion/discharge. Eyes: No change in vision, double/blurred vision or eye pain/redness. Cardiovascular: No CP/palpitations. No TORO/orthopnea/PND. Respiratory: No cough/sputum, dyspnea, wheezing. Gastointestinal: No dysphagia, reflux. No n/v. No constipation/diarrhea. No melena/rectal bleeding. Genitourinary: No dysuria, urinary hesitancy, nocturia, hematuria. No incontinence. Neurological: No seizures/numbness/weakness. No paresthesias. Psychiatric:  No depression, anxiety. The problem list was updated as a part of today's visit. Patient Active Problem List   Diagnosis Code    Controlled type 2 diabetes mellitus without complication, without long-term current use of insulin ([facility]) E11.9    CKD (chronic kidney disease) stage 3, GFR 30-59 ml/min ([facility]) N18.3    Essential hypertension I10    Elevated PSA R97.20    Benign prostatic hyperplasia with lower urinary tract symptoms N40.1    B12 deficiency E53.8    Diabetic nephropathy associated with type 2 diabetes mellitus ([facility]) E11.21    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 cholecystitis K80.21    Adenomatous polyp of descending colon D12.4    Localized edema R60.0    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     SH:  Social History     Tobacco Use    Smoking status: Never Smoker    Smokeless tobacco: Never Used   Substance Use Topics    Alcohol use: No    Drug use: No       Medications/Allergies:  Current Outpatient Medications on File Prior to Visit   Medication Sig Dispense Refill    SITagliptin (Januvia) 50 mg tablet TAKE 1 TABLET BY MOUTH  DAILY 90 Tab 0    tamsulosin (FLOMAX) 0.4 mg capsule TAKE 1 CAPSULE BY MOUTH  DAILY AFTER DINNER 90 Cap 3    atorvastatin (LIPITOR) 10 mg tablet TAKE 1 TABLET BY MOUTH  DAILY 90 Tab 3    fluticasone propionate (FLONASE) 50 mcg/actuation nasal spray USE 2 SPRAYS IN EACH  NOSTRIL DAILY 16 g 3    furosemide (LASIX) 40 mg tablet TAKE 1 TABLET BY MOUTH  DAILY 90 Tab 3    Bystolic 5 mg tablet TAKE 1 TABLET BY MOUTH  DAILY 90 Tab 1    insulin glargine (Lantus Solostar U-100 Insulin) 100 unit/mL (3 mL) inpn INJECT SUBCUTANEOUSLY 20  UNITS DAILY 30 mL 0    hydrALAZINE (APRESOLINE) 100 mg tablet TAKE 1 TABLET BY MOUTH 3  TIMES DAILY 270 Tab 1    Insulin Needles, Disposable, 31 gauge x 5/16\" ndle USE DAILY WITH LANTUS 90 Pen Needle 3    benzonatate (TESSALON) 200 mg capsule Take 1 Cap by mouth three (3) times daily as needed for Cough. 30 Cap 0    triamcinolone acetonide (KENALOG) 0.1 % topical cream Apply  to affected area two (2) times a day. use thin layer 454 g 3    sodium bicarbonate 325 mg tablet Take 1 Tab by mouth daily. 90 Tab 0    glimepiride (AMARYL) 1 mg tablet Take 1 Tab by mouth Daily (before breakfast). 90 Tab 0    olopatadine (PATADAY) 0.2 % drop ophthalmic solution Administer 1 Drop to both eyes daily. 7.5 mL 0    glucose blood VI test strips (ONETOUCH ULTRA TEST) strip Test blood glucose bid. E11.9 300 Strip 3    albuterol (PROVENTIL VENTOLIN) 2.5 mg /3 mL (0.083 %) nebulizer solution 3 mL by Nebulization route every four (4) hours as needed for Wheezing. 24 Each 1    albuterol (PROVENTIL HFA, VENTOLIN HFA, PROAIR HFA) 90 mcg/actuation inhaler Take 2 Puffs by inhalation every four (4) hours as needed for Wheezing. 1 Inhaler 11    cyanocobalamin 1,000 mcg tablet Take 1,000 mcg by mouth daily.  cholecalciferol (VITAMIN D3) 1,000 unit cap Take  by mouth daily.       vitamin E (AQUA GEMS) 400 unit capsule Take  by mouth daily.      aspirin 81 mg chewable tablet Take 81 mg by mouth daily.  cranberry extract 450 mg tab Take  by mouth.  ascorbic acid, vitamin C, (VITAMIN C) 500 mg tablet Take 1,000 mg by mouth.  OTHER Indications: Osteo Bi-Flex 1 per day       No current facility-administered medications on file prior to visit. Allergies   Allergen Reactions    Norvasc [Amlodipine] Swelling     Patient states NKDA       Objective:  Visit Vitals  /63 (BP 1 Location: Left arm, BP Patient Position: Sitting)   Pulse 60   Temp 97.8 °F (36.6 °C) (Oral)   Resp 19   Ht 5' 7\" (1.702 m)   Wt 220 lb 9.6 oz (100.1 kg)   SpO2 97%   BMI 34.55 kg/m²      Constitutional: Well developed, nourished, no distress, alert, obese habitus   CV: S1, S2.  RRR. No murmurs/rubs. No edema. Pulm: No abnormalities on inspection. Clear to auscultation bilaterally. No wheezing/rhonchi. Normal effort. GI: Soft, tenderness over R abd,  Distended, protuberant. Normal active bowel sounds. +large bulge on R flank   MS: Gait normal.  Joints without deformity/tenderness. Strength intact bilateral upper and lower ext. No paraspinal tenderness   Neuro: A/O x 3. No focal motor or sensory deficits. Speech normal. Normal monofilament along entire torso. R flank and R abdomen hyperesthesia. Skin: No lesions/rashes on inspection. Psych: Appropriate affect, judgement and insight. Short-term memory intact. Labwork and Ancillary Studies:    u/s 9/6:   No acute sonographic abnormality in the right upper quadrant of the abdomen. 2. Cholelithiasis and gallbladder polyps but no sonographic evidence of acute cholecystitis. CT abd/pelvis:No clear explanation for abdominal pain symptoms. 2. Hyperdense lesions at the upper pole of the right kidney likely hemorrhagic cysts, with simple appearing correlates on prior ultrasound. 3. Cholelithiasis.

## 2020-09-15 DIAGNOSIS — R19.01 RIGHT UPPER QUADRANT ABDOMINAL MASS: Primary | ICD-10-CM

## 2020-09-24 RX ORDER — GLIMEPIRIDE 1 MG/1
1 TABLET ORAL
Qty: 90 TAB | Refills: 0 | Status: SHIPPED | OUTPATIENT
Start: 2020-09-24 | End: 2020-11-12

## 2020-09-29 ENCOUNTER — HOSPITAL ENCOUNTER (OUTPATIENT)
Dept: MRI IMAGING | Age: 80
Discharge: HOME | End: 2020-09-29
Attending: INTERNAL MEDICINE
Payer: MEDICARE

## 2020-09-29 DIAGNOSIS — R19.01 RIGHT UPPER QUADRANT ABDOMINAL MASS: ICD-10-CM

## 2020-09-29 PROCEDURE — 74181 MRI ABDOMEN W/O CONTRAST: CPT

## 2020-10-06 ENCOUNTER — IMPORTED ENCOUNTER (OUTPATIENT)
Dept: URBAN - METROPOLITAN AREA CLINIC 1 | Facility: CLINIC | Age: 80
End: 2020-10-06

## 2020-10-06 PROBLEM — H25.813: Noted: 2020-10-06

## 2020-10-06 PROBLEM — H40.013: Noted: 2020-10-06

## 2020-10-06 PROBLEM — E11.9: Noted: 2020-10-06

## 2020-10-06 PROBLEM — Z79.4: Noted: 2020-10-06

## 2020-10-06 PROCEDURE — 92136 OPHTHALMIC BIOMETRY: CPT

## 2020-10-06 PROCEDURE — 92012 INTRM OPH EXAM EST PATIENT: CPT

## 2020-10-06 PROCEDURE — 92015 DETERMINE REFRACTIVE STATE: CPT

## 2020-10-06 NOTE — PATIENT DISCUSSION
1.  Cataract OU:  Visually Significant discussed the risks benefits alternatives and limitations of cataract surgery. The patient stated a full understanding and a desire to proceed with the procedure. Discussed with patient if PO Gtts are more than $120 for all three combined when filling at their Pharmacy please call our office to request generic substitutions. Pt came to pre-op appointment today alone and Lifestyle Questionnaire Completed. Pt understands they will need glasses post-op to achieve their best corrected vision. *Myopic Target. Phaco PCL OS then ODPhaco PCL OS2. DM Type II without sign of diabetic retinopathy and no blot heme on dilated retinal examination today OU No Macular Edema:  Discussed the pathophysiology of diabetes and its effect on the eye and risk of blindness. Stressed the importance of strong glucose control. Advised of importance of at least yearly dilated examinations but to contact us immediately for any problems or concerns. 3. Glaucoma Suspect OU (CD [de-identified]) IOP stable. Past w/u negative. Patient is considered Low Risk. Condition was discussed with patient and patient understands. Will continue to monitor patient for any progression in condition. Patient was advised to call us with any problems questions or concerns. 5.  Anterior Blepharitis OU -- Daily Hot compresses and lid scrubs were recommended. Return for an appointment in F/u as scheduled with Dr. Omar Nam. numerical 0-10

## 2020-10-07 DIAGNOSIS — E11.22 TYPE 2 DIABETES MELLITUS WITH STAGE 3 CHRONIC KIDNEY DISEASE, WITHOUT LONG-TERM CURRENT USE OF INSULIN (HCC): ICD-10-CM

## 2020-10-07 DIAGNOSIS — N18.30 TYPE 2 DIABETES MELLITUS WITH STAGE 3 CHRONIC KIDNEY DISEASE, WITHOUT LONG-TERM CURRENT USE OF INSULIN (HCC): ICD-10-CM

## 2020-10-07 RX ORDER — INSULIN GLARGINE 100 [IU]/ML
INJECTION, SOLUTION SUBCUTANEOUS
Qty: 30 ML | Refills: 3 | Status: SHIPPED | OUTPATIENT
Start: 2020-10-07 | End: 2021-11-03 | Stop reason: SDUPTHER

## 2020-10-14 ENCOUNTER — TELEPHONE (OUTPATIENT)
Dept: FAMILY MEDICINE CLINIC | Age: 80
End: 2020-10-14

## 2020-10-14 DIAGNOSIS — M79.2 NEUROPATHIC PAIN: ICD-10-CM

## 2020-10-14 NOTE — TELEPHONE ENCOUNTER
Requested Prescriptions     Pending Prescriptions Disp Refills    gabapentin (NEURONTIN) 300 mg capsule 60 Cap 0     Sig: Take 1 Cap by mouth two (2) times a day. Max Daily Amount: 600 mg.      Pt is currently out of the medication

## 2020-10-15 RX ORDER — GABAPENTIN 300 MG/1
300 CAPSULE ORAL 3 TIMES DAILY
Qty: 270 CAP | Refills: 0 | Status: SHIPPED | OUTPATIENT
Start: 2020-10-15 | End: 2021-02-23 | Stop reason: SDUPTHER

## 2020-10-15 NOTE — TELEPHONE ENCOUNTER
Patient called back again and was informed of the turn around time for prescriptions. Pt verbalized understanding stated that they are completely out.

## 2020-10-16 ENCOUNTER — IMPORTED ENCOUNTER (OUTPATIENT)
Dept: URBAN - METROPOLITAN AREA CLINIC 1 | Facility: CLINIC | Age: 80
End: 2020-10-16

## 2020-10-16 PROBLEM — H25.812 COMBINED FORM OF SENILE CATARACT OF LEFT EYE: Status: RESOLVED | Noted: 2020-10-16 | Resolved: 2020-10-16

## 2020-10-16 PROBLEM — H25.812 COMBINED FORM OF SENILE CATARACT OF LEFT EYE: Status: ACTIVE | Noted: 2020-10-16

## 2020-10-17 ENCOUNTER — IMPORTED ENCOUNTER (OUTPATIENT)
Dept: URBAN - METROPOLITAN AREA CLINIC 1 | Facility: CLINIC | Age: 80
End: 2020-10-17

## 2020-10-17 PROBLEM — Z96.1: Noted: 2020-10-17

## 2020-10-17 PROCEDURE — 99024 POSTOP FOLLOW-UP VISIT: CPT

## 2020-10-17 NOTE — PATIENT DISCUSSION
POD#1 CE/IOL OS (Standard) doing well. *Myopic Goal*. Use Prednisolone BID OS Prolensa Qdaily OS Ocuflox TID OS : Use all three gtts through completion of PO gtt chart regimen/ Per our instructions given to patient.   Post op Warnings Reiterated RTC as scheduled

## 2020-10-23 ENCOUNTER — IMPORTED ENCOUNTER (OUTPATIENT)
Dept: URBAN - METROPOLITAN AREA CLINIC 1 | Facility: CLINIC | Age: 80
End: 2020-10-23

## 2020-10-23 PROBLEM — H25.811: Noted: 2020-10-23

## 2020-10-23 PROCEDURE — 92136 OPHTHALMIC BIOMETRY: CPT

## 2020-10-23 NOTE — PATIENT DISCUSSION
1.  Cataract OD: Visually Significant secondary to glare discussed the risks benefits alternatives and limitations of cataract surgery. The patient stated a full understanding and a desire to proceed with the procedure. Discussed with patient if PO Gtts are more than $120 for all three combined when filling at their Pharmacy please call our office to request generic substitutions. Pt understands they will need glasses post-op to achieve their best corrected vision. Phaco PCL OD *Myopic Goal*. 2.  POW#3  CE/IOL OS (Standard) doing well. *Myopic Goal*. Use Prednisolone BID OS & Prolensa Qdaily OS: Use through completion of po gtt regimen.  F/u as scheduled 2nd eye

## 2020-11-12 RX ORDER — GLIMEPIRIDE 1 MG/1
TABLET ORAL
Qty: 90 TAB | Refills: 3 | Status: SHIPPED | OUTPATIENT
Start: 2020-11-12 | End: 2021-11-17 | Stop reason: SDUPTHER

## 2020-11-20 ENCOUNTER — IMPORTED ENCOUNTER (OUTPATIENT)
Dept: URBAN - METROPOLITAN AREA CLINIC 1 | Facility: CLINIC | Age: 80
End: 2020-11-20

## 2020-11-20 PROBLEM — H25.811 COMBINED FORM OF SENILE CATARACT OF RIGHT EYE: Status: RESOLVED | Noted: 2020-11-20 | Resolved: 2020-11-20

## 2020-11-20 PROBLEM — H25.811 COMBINED FORM OF SENILE CATARACT OF RIGHT EYE: Status: ACTIVE | Noted: 2020-11-20

## 2020-11-21 ENCOUNTER — IMPORTED ENCOUNTER (OUTPATIENT)
Dept: URBAN - METROPOLITAN AREA CLINIC 1 | Facility: CLINIC | Age: 80
End: 2020-11-21

## 2020-11-21 DIAGNOSIS — I10 ESSENTIAL HYPERTENSION: ICD-10-CM

## 2020-11-21 DIAGNOSIS — N18.30 TYPE 2 DIABETES MELLITUS WITH STAGE 3 CHRONIC KIDNEY DISEASE, WITHOUT LONG-TERM CURRENT USE OF INSULIN (HCC): ICD-10-CM

## 2020-11-21 DIAGNOSIS — E11.22 TYPE 2 DIABETES MELLITUS WITH STAGE 3 CHRONIC KIDNEY DISEASE, WITHOUT LONG-TERM CURRENT USE OF INSULIN (HCC): ICD-10-CM

## 2020-11-21 PROBLEM — Z96.1: Noted: 2020-11-21

## 2020-11-21 PROCEDURE — 99024 POSTOP FOLLOW-UP VISIT: CPT

## 2020-11-21 NOTE — PATIENT DISCUSSION
1. POD#1 Phaco/ PCL OD (Standard) doing well. *Myopic Goal*. Use Prednisolone BID OD Prolensa Qdaily OD Ocuflox TID OD : Use all three gtts through completion of PO gtt chart regimen/ Per our instructions given. Post op Warnings Reiterated 2. POW#3 Phaco/ PCL OS (Standard) doing well. *Myopic Goal*. Use Prednisolone BID OS & Prolensa Qdaily OS: Use through completion of po gtt regimen.  RTC as scheduled

## 2020-11-23 RX ORDER — NEBIVOLOL 5 MG/1
TABLET ORAL
Qty: 90 TAB | Refills: 3 | Status: SHIPPED | OUTPATIENT
Start: 2020-11-23 | End: 2021-10-20 | Stop reason: SDUPTHER

## 2020-11-23 RX ORDER — HYDRALAZINE HYDROCHLORIDE 100 MG/1
TABLET, FILM COATED ORAL
Qty: 270 TAB | Refills: 3 | Status: SHIPPED | OUTPATIENT
Start: 2020-11-23 | End: 2021-10-20 | Stop reason: SDUPTHER

## 2020-12-08 ENCOUNTER — TELEPHONE (OUTPATIENT)
Dept: FAMILY MEDICINE CLINIC | Age: 80
End: 2020-12-08

## 2020-12-08 DIAGNOSIS — N18.30 TYPE 2 DIABETES MELLITUS WITH STAGE 3 CHRONIC KIDNEY DISEASE, WITHOUT LONG-TERM CURRENT USE OF INSULIN (HCC): ICD-10-CM

## 2020-12-08 DIAGNOSIS — E11.22 TYPE 2 DIABETES MELLITUS WITH STAGE 3 CHRONIC KIDNEY DISEASE, WITHOUT LONG-TERM CURRENT USE OF INSULIN (HCC): ICD-10-CM

## 2020-12-08 RX ORDER — PEN NEEDLE, DIABETIC 30 GX3/16"
NEEDLE, DISPOSABLE MISCELLANEOUS
Qty: 90 PEN NEEDLE | Refills: 3 | Status: SHIPPED | OUTPATIENT
Start: 2020-12-08 | End: 2020-12-09 | Stop reason: SDUPTHER

## 2020-12-08 NOTE — TELEPHONE ENCOUNTER
Pharmacy is asking for a new script for the following medication insulin needles they only come in a quantity of 100 instead of 90 pens

## 2020-12-09 DIAGNOSIS — N18.30 TYPE 2 DIABETES MELLITUS WITH STAGE 3 CHRONIC KIDNEY DISEASE, WITHOUT LONG-TERM CURRENT USE OF INSULIN (HCC): ICD-10-CM

## 2020-12-09 DIAGNOSIS — E11.22 TYPE 2 DIABETES MELLITUS WITH STAGE 3 CHRONIC KIDNEY DISEASE, WITHOUT LONG-TERM CURRENT USE OF INSULIN (HCC): ICD-10-CM

## 2020-12-09 RX ORDER — PEN NEEDLE, DIABETIC 30 GX3/16"
NEEDLE, DISPOSABLE MISCELLANEOUS
Qty: 100 PEN NEEDLE | Refills: 3 | Status: SHIPPED | OUTPATIENT
Start: 2020-12-09 | End: 2021-06-08 | Stop reason: SDUPTHER

## 2020-12-11 ENCOUNTER — IMPORTED ENCOUNTER (OUTPATIENT)
Dept: URBAN - METROPOLITAN AREA CLINIC 1 | Facility: CLINIC | Age: 80
End: 2020-12-11

## 2020-12-11 PROBLEM — Z09: Noted: 2020-12-11

## 2020-12-11 NOTE — PATIENT DISCUSSION
POW#3 Phaco/ PCL Standard OU w/ Myopic Target Finish PO meds per PO gtt schedule MRX for glasses givenReturn for an appointment in 6 months for 30/OCT with Dr. Xenia Koehler.

## 2021-02-02 LAB — CREATININE, EXTERNAL: 1.8

## 2021-02-05 NOTE — TELEPHONE ENCOUNTER
Requested Prescriptions     Pending Prescriptions Disp Refills    triamcinolone acetonide (KENALOG) 0.1 % topical cream 454 g 3     Sig: Apply  to affected area two (2) times a day. use thin layer     Pt's wife called to request a one time refill on behalf of the patient. They are following Dr Andrez Dandy to her new practice but she does not have their records as of yet to do the refill. Please advise.

## 2021-02-08 RX ORDER — TRIAMCINOLONE ACETONIDE 1 MG/G
CREAM TOPICAL 2 TIMES DAILY
Qty: 45 G | Refills: 0 | Status: SHIPPED | OUTPATIENT
Start: 2021-02-08 | End: 2021-03-25 | Stop reason: SDUPTHER

## 2021-02-23 DIAGNOSIS — M79.2 NEUROPATHIC PAIN: ICD-10-CM

## 2021-02-23 RX ORDER — GABAPENTIN 300 MG/1
300 CAPSULE ORAL 3 TIMES DAILY
Qty: 270 CAP | Refills: 0 | Status: SHIPPED | OUTPATIENT
Start: 2021-02-23 | End: 2021-06-17

## 2021-02-23 NOTE — TELEPHONE ENCOUNTER
Requested Prescriptions Pending Prescriptions Disp Refills  gabapentin (NEURONTIN) 300 mg capsule 270 Cap 0 Sig: Take 1 Cap by mouth three (3) times daily. Max Daily Amount: 900 mg.

## 2021-03-25 ENCOUNTER — HOSPITAL ENCOUNTER (OUTPATIENT)
Dept: LAB | Age: 81
Discharge: HOME | End: 2021-03-25
Payer: MEDICARE

## 2021-03-25 ENCOUNTER — OFFICE VISIT (OUTPATIENT)
Dept: FAMILY MEDICINE CLINIC | Age: 81
End: 2021-03-25
Payer: MEDICARE

## 2021-03-25 ENCOUNTER — APPOINTMENT (OUTPATIENT)
Dept: FAMILY MEDICINE CLINIC | Age: 81
End: 2021-03-25

## 2021-03-25 VITALS
WEIGHT: 227.8 LBS | DIASTOLIC BLOOD PRESSURE: 68 MMHG | HEART RATE: 58 BPM | OXYGEN SATURATION: 94 % | BODY MASS INDEX: 34.53 KG/M2 | TEMPERATURE: 98.2 F | SYSTOLIC BLOOD PRESSURE: 132 MMHG | RESPIRATION RATE: 18 BRPM | HEIGHT: 68 IN

## 2021-03-25 DIAGNOSIS — R97.20 ELEVATED PSA: ICD-10-CM

## 2021-03-25 DIAGNOSIS — L30.9 ECZEMA, UNSPECIFIED TYPE: ICD-10-CM

## 2021-03-25 DIAGNOSIS — N18.32 TYPE 2 DIABETES MELLITUS WITH STAGE 3B CHRONIC KIDNEY DISEASE, WITHOUT LONG-TERM CURRENT USE OF INSULIN (HCC): ICD-10-CM

## 2021-03-25 DIAGNOSIS — N18.32 TYPE 2 DIABETES MELLITUS WITH STAGE 3B CHRONIC KIDNEY DISEASE, WITHOUT LONG-TERM CURRENT USE OF INSULIN (HCC): Primary | ICD-10-CM

## 2021-03-25 DIAGNOSIS — Z11.59 NEED FOR HEPATITIS C SCREENING TEST: ICD-10-CM

## 2021-03-25 DIAGNOSIS — E78.2 MIXED HYPERLIPIDEMIA: ICD-10-CM

## 2021-03-25 DIAGNOSIS — I10 ESSENTIAL HYPERTENSION: ICD-10-CM

## 2021-03-25 DIAGNOSIS — E11.22 TYPE 2 DIABETES MELLITUS WITH STAGE 3B CHRONIC KIDNEY DISEASE, WITHOUT LONG-TERM CURRENT USE OF INSULIN (HCC): ICD-10-CM

## 2021-03-25 DIAGNOSIS — E11.22 TYPE 2 DIABETES MELLITUS WITH STAGE 3B CHRONIC KIDNEY DISEASE, WITHOUT LONG-TERM CURRENT USE OF INSULIN (HCC): Primary | ICD-10-CM

## 2021-03-25 LAB
ALBUMIN SERPL-MCNC: 4 G/DL (ref 3.4–5)
ALBUMIN/GLOB SERPL: 1.3 {RATIO} (ref 0.8–1.7)
ALP SERPL-CCNC: 58 U/L (ref 45–117)
ALT SERPL-CCNC: 21 U/L (ref 16–61)
AST SERPL-CCNC: 14 U/L (ref 10–38)
BILIRUB DIRECT SERPL-MCNC: 0.2 MG/DL (ref 0–0.2)
BILIRUB SERPL-MCNC: 1 MG/DL (ref 0.2–1)
CHOLEST SERPL-MCNC: 137 MG/DL
CREAT UR-MCNC: 45 MG/DL (ref 30–125)
GLOBULIN SER CALC-MCNC: 3.2 G/DL (ref 2–4)
HBA1C MFR BLD HPLC: 9 %
HDLC SERPL-MCNC: 44 MG/DL (ref 40–60)
HDLC SERPL: 3.1 {RATIO} (ref 0–5)
LDLC SERPL CALC-MCNC: 78.4 MG/DL (ref 0–100)
LIPID PROFILE,FLP: NORMAL
MICROALBUMIN UR-MCNC: 27 MG/DL (ref 0–3)
MICROALBUMIN/CREAT UR-RTO: 600 MG/G (ref 0–30)
PROT SERPL-MCNC: 7.2 G/DL (ref 6.4–8.2)
TRIGL SERPL-MCNC: 73 MG/DL (ref ?–150)
VLDLC SERPL CALC-MCNC: 14.6 MG/DL

## 2021-03-25 PROCEDURE — 82043 UR ALBUMIN QUANTITATIVE: CPT

## 2021-03-25 PROCEDURE — G8510 SCR DEP NEG, NO PLAN REQD: HCPCS | Performed by: INTERNAL MEDICINE

## 2021-03-25 PROCEDURE — 84153 ASSAY OF PSA TOTAL: CPT

## 2021-03-25 PROCEDURE — 80061 LIPID PANEL: CPT

## 2021-03-25 PROCEDURE — 1101F PT FALLS ASSESS-DOCD LE1/YR: CPT | Performed by: INTERNAL MEDICINE

## 2021-03-25 PROCEDURE — G8417 CALC BMI ABV UP PARAM F/U: HCPCS | Performed by: INTERNAL MEDICINE

## 2021-03-25 PROCEDURE — 84154 ASSAY OF PSA FREE: CPT

## 2021-03-25 PROCEDURE — G8754 DIAS BP LESS 90: HCPCS | Performed by: INTERNAL MEDICINE

## 2021-03-25 PROCEDURE — 83036 HEMOGLOBIN GLYCOSYLATED A1C: CPT | Performed by: INTERNAL MEDICINE

## 2021-03-25 PROCEDURE — 86803 HEPATITIS C AB TEST: CPT

## 2021-03-25 PROCEDURE — G8536 NO DOC ELDER MAL SCRN: HCPCS | Performed by: INTERNAL MEDICINE

## 2021-03-25 PROCEDURE — G8427 DOCREV CUR MEDS BY ELIG CLIN: HCPCS | Performed by: INTERNAL MEDICINE

## 2021-03-25 PROCEDURE — 3052F HG A1C>EQUAL 8.0%<EQUAL 9.0%: CPT | Performed by: INTERNAL MEDICINE

## 2021-03-25 PROCEDURE — 80076 HEPATIC FUNCTION PANEL: CPT

## 2021-03-25 PROCEDURE — 36415 COLL VENOUS BLD VENIPUNCTURE: CPT

## 2021-03-25 PROCEDURE — G8752 SYS BP LESS 140: HCPCS | Performed by: INTERNAL MEDICINE

## 2021-03-25 PROCEDURE — 99214 OFFICE O/P EST MOD 30 MIN: CPT | Performed by: INTERNAL MEDICINE

## 2021-03-25 RX ORDER — TRIAMCINOLONE ACETONIDE 1 MG/G
CREAM TOPICAL
Qty: 454 G | Refills: 3 | Status: SHIPPED | OUTPATIENT
Start: 2021-03-25

## 2021-03-25 NOTE — PROGRESS NOTES
Ev Cleaning is a 79 y.o. male (: 1940) presenting to address:    Chief Complaint   Patient presents with    Transitions Of Care       Vitals:    21 0808 21 0814   BP: (!) 156/80 (!) 143/63   Pulse: (!) 58    Resp: 18    Temp: 98.2 °F (36.8 °C)    TempSrc: Temporal    SpO2: 91% 94%   Weight: 227 lb 12.8 oz (103.3 kg)    Height: 5' 8\" (1.727 m)    PainSc:   8    PainLoc: Abdomen        Hearing/Vision:   No exam data present    Learning Assessment:     Learning Assessment 2017   PRIMARY LEARNER Patient   HIGHEST LEVEL OF EDUCATION - PRIMARY LEARNER  4 YEARS OF COLLEGE   BARRIERS PRIMARY LEARNER NONE   CO-LEARNER CAREGIVER No   PRIMARY LANGUAGE ENGLISH    NEED No   LEARNER PREFERENCE PRIMARY VIDEOS   ANSWERED BY self   RELATIONSHIP SELF     Depression Screening:     3 most recent PHQ Screens 3/25/2021   Little interest or pleasure in doing things Not at all   Feeling down, depressed, irritable, or hopeless Not at all   Total Score PHQ 2 0     Fall Risk Assessment:     Fall Risk Assessment, last 12 mths 3/25/2021   Able to walk? Yes   Fall in past 12 months? 0   Do you feel unsteady? 0   Are you worried about falling 0   Number of falls in past 12 months -   Fall with injury? -     Abuse Screening:     Abuse Screening Questionnaire 3/25/2021   Do you ever feel afraid of your partner? N   Are you in a relationship with someone who physically or mentally threatens you? N   Is it safe for you to go home? Y     Coordination of Care Questionaire:   1. Have you been to the ER, urgent care clinic since your last visit? [place]    2. Have you seen or consulted any other health care providers outside of the [address] [place] [address] since your last visit? Include any pap smears or colon screening. YES- eye, nephrology    Advanced Directive:   1. Do you have an Advanced Directive? YES    2. Would you like information on Advanced Directives?  NO

## 2021-03-25 NOTE — PATIENT INSTRUCTIONS
Increase your Lantus insulin dose to 22 units every evening, you may increase your dose further by 2 units every 2 days until your fasting glucose is 130 or less.  
Please monitor glucose before each meal.

## 2021-03-25 NOTE — PROGRESS NOTES
HISTORY OF PRESENT ILLNESS  Emiliano Park is a 79 y.o. male. HPI  New pt to me  DM with stage 3 CKD, not well controlled, glucose around 150 in am, he does not check it daily, he is seen by Dr Rajinder Levine for his CKD  HTN, stable, bp is controlled on meds daily  HLD, controlled on lipitor daily  H/o elevated PSA, last PSA was 5.0, evaluated by urology 2 years ago, pt wanted PSA repeated today  Neuropathic pain, stable on Neurontin TID, he stated that he take one po BID some times if he has no pain, he was evaluated by GI and Surgery for his pain, no clear cause, he will follow up with Dr Shayla Gomes soon  Eczema, stable on Kenalog cream as needed, need refill    Review of Systems   Constitutional: Negative for fever. Respiratory: Negative for cough and shortness of breath. Cardiovascular: Negative for chest pain and palpitations. Gastrointestinal: Positive for abdominal pain (chronic). Negative for heartburn, melena, nausea and vomiting. Musculoskeletal: Negative for myalgias. Neurological: Negative for headaches. Physical Exam  Vitals signs reviewed. Constitutional:       Appearance: Normal appearance. Cardiovascular:      Rate and Rhythm: Normal rate and regular rhythm. Heart sounds: No murmur. Pulmonary:      Effort: Pulmonary effort is normal.      Breath sounds: Normal breath sounds. Abdominal:      General: Bowel sounds are normal.      Palpations: Abdomen is soft. Tenderness: There is abdominal tenderness. There is no guarding or rebound. Musculoskeletal:      Right lower leg: No edema. Left lower leg: No edema. Skin:     Comments: Feet: normal sensation to microfilament test, good pulse, no callus. Neurological:      Mental Status: He is alert and oriented to person, place, and time. ASSESSMENT and PLAN  Diagnoses and all orders for this visit:    1.  Type 2 diabetes mellitus with stage 3b chronic kidney disease, without long-term current use of insulin ([facility] [address]  -     AMB POC HEMOGLOBIN A1C  -     MICROALBUMIN, UR, RAND W/ MICROALB/CREAT RATIO; Future  -     REFERRAL TO DIETITIAN  - advised to increase Lantus dose by 2 units every 2 days until fasting glucose is 130 or less, advised to monitor glucose TID/AC    2. Essential hypertension, stable, continue current meds, he has refills    3. Mixed hyperlipidemia, continue Lipitor, he has refills  -     LIPID PANEL; Future  -     HEPATIC FUNCTION PANEL; Future    4. Need for hepatitis C screening test  -     HCV AB W/RFLX TO KRYS; Future    5. Eczema, unspecified type  -     triamcinolone acetonide (KENALOG) 0.1 % topical cream; Apply  to affected area two (2) times daily as needed for Skin Irritation or Itching. 6. Elevated PSA  -     PSA W/ REFLX FREE PSA; Future  - will cc copy to Urology      Follow-up and Dispositions    · Return in about 1 month (around 4/25/2021).        Results for orders placed or performed in visit on 03/25/21   AMB EXT CREATININE   Result Value Ref Range    Creatinine, External 1.8    AMB POC HEMOGLOBIN A1C   Result Value Ref Range    Hemoglobin A1c (POC) 9.0 %     Lab Results   Component Value Date/Time    Cholesterol, total 132 05/01/2019 07:51 AM    HDL Cholesterol 47 05/01/2019 07:51 AM    LDL, calculated 68 05/01/2019 07:51 AM    VLDL, calculated 17 05/01/2019 07:51 AM    Triglyceride 85 05/01/2019 07:51 AM    CHOL/HDL Ratio 2.8 05/01/2019 07:51 AM     He had labs on 2-18-21 for Nephrology, creat was 1.8, GFR 36.1, Hgb 12.8

## 2021-03-26 LAB
% FREE PSA, 480797: 52.2 %
HCV AB S/CO SERPL IA: <0.1 S/CO RATIO (ref 0–0.9)
HCV AB SERPL QL IA: NORMAL
PSA SERPL-MCNC: 5 NG/ML (ref 0–4)
PSA, FREE, 480853: 2.61 NG/ML
REFLEX CRITERIA: ABNORMAL

## 2021-03-26 NOTE — PROGRESS NOTES
PSA is 5, similar to last year, stable, no need for further PSA testing  Cholesterol is well controlled  Hep c screening negative  Continue current meds

## 2021-04-21 ENCOUNTER — HOSPITAL ENCOUNTER (OUTPATIENT)
Dept: NUTRITION | Age: 81
Discharge: HOME | End: 2021-04-21
Payer: MEDICARE

## 2021-04-21 PROCEDURE — 97802 MEDICAL NUTRITION INDIV IN: CPT

## 2021-04-21 NOTE — PROGRESS NOTES
..  [address] [place] [address], [address] [place] [address]   Nutrition Assessment  Medical Nutrition Therapy   Outpatient Initial Evaluation         Patient Name: Preeti Drummond : 1940   Treatment Diagnosis: Type 2 DM, CKD (stage 3), obesity   Referral Source: Roque Martin MD Start of Care [facility]): 2021     Gender: male Age: 79 y.o. Ht: 68 in Wt: 220  lb  kg   BMI: 34.64 BMR   Male 0119 BMR Female      Past Medical History:  HTN, high cholesterol, type 2 DM since      Pertinent Medications:   Lantus 22 units daily, Lasix, Lipitor, blood pressure meds, Januvia just added (according to patient)     Biochemical Data:   Lab Results   Component Value Date/Time    Hemoglobin A1c 7.8 (H) 2019 08:14 AM    Hemoglobin A1c (POC) 9.0 2021 08:39 AM    Hemoglobin A1c, External 8.3 2019     Lab Results   Component Value Date/Time    Sodium 141 2019 08:14 AM    Potassium 4.7 2019 08:14 AM    Chloride 105 2019 08:14 AM    CO2 29 2019 08:14 AM    Anion gap 7 2019 08:14 AM    Glucose 212 (H) 2019 08:14 AM    BUN 34 (H) 2019 08:14 AM    Creatinine 1.70 (H) 2019 08:14 AM    BUN/Creatinine ratio 20 2019 08:14 AM    GFR est AA 47 (L) 2019 08:14 AM    GFR est non-AA 39 (L) 2019 08:14 AM    Calcium 8.9 2019 08:14 AM    Bilirubin, total 1.0 2021 09:11 AM    Alk.  phosphatase 58 2021 09:11 AM    Protein, total 7.2 2021 09:11 AM    Albumin 4.0 2021 09:11 AM    Globulin 3.2 2021 09:11 AM    A-G Ratio 1.3 2021 09:11 AM    ALT (SGPT) 21 2021 09:11 AM    AST (SGOT) 14 2021 09:11 AM     Lab Results   Component Value Date/Time    Cholesterol, total 137 2021 09:11 AM    HDL Cholesterol 44 2021 09:11 AM    LDL, calculated 78.4 2021 09:11 AM    VLDL, calculated 14.6 2021 09:11 AM    Triglyceride 73 2021 09:11 AM CHOL/HDL Ratio 3.1 03/25/2021 09:11 AM     Lab Results   Component Value Date/Time    ALT (SGPT) 21 03/25/2021 09:11 AM    AST (SGOT) 14 03/25/2021 09:11 AM    Alk. phosphatase 58 03/25/2021 09:11 AM    Bilirubin, direct 0.2 03/25/2021 09:11 AM    Bilirubin, total 1.0 03/25/2021 09:11 AM     Lab Results   Component Value Date/Time    Creatinine 1.70 (H) 09/04/2019 08:14 AM     Lab Results   Component Value Date/Time    BUN 34 (H) 09/04/2019 08:14 AM     Lab Results   Component Value Date/Time    Microalbumin/Creat ratio (mg/g creat) 600 (H) 03/25/2021 09:11 AM    Microalbumin,urine random 27.00 (H) 03/25/2021 09:11 AM        Assessment:    The patient is here today with his wife. He says he has had diabetes since 1995 and he has never received any education for it in the past. He does not check his blood sugars. He says he sees an nephrologist twice a year for his kidney disease; he is not on any restrictions besides a sodium restriction. The patient and his wife moved here approximately 4 years ago to be near their children; the couple lives with their daughter on a farm in the [facility]. The patient says he'd like to lose 40#. He exercises \"sometimes\" (on a climbing treadmill at home). Food & Nutrition: The patient says he's been eating less to lose weight but he is actually skipping meals and eating a lot of fruit instead. Breakfast (8 AM): granola (1/4 cup), 1/4 blueberries, raw whole milk  Lunch: fruit (all afternoon)  Dinner (5-5:30): what is prepared for him. Beef, beans and cheese nachos, Roast pork, mashed potatoes, green beans or carrots  The patient says he stopped eating sweets but loves ice cream.   He drinks one sweet tea a day.     Problem areas: skipping meals, eating fruit all day, prefers a \"meat and potatoes\" diet, patient's age (he is set in his ways), not a lot of physical activity       Estimate Needs   Calories: 1700  Protein: 106 Carbs: 191 Fat: 57   Kcal/day  g/day  g/day  g/day percent: 25  39  30               Nutrition Diagnosis   Nutrition knowledge deficit related to type 2 DM, CKD stage 3 as evidenced by overconsumption of CHO containing foods and fatty foods, lack of exercise, inappropriate diet to prevent weight gain over time. Altered nutrition related lab values related to type 2 DM, CKD as evidenced by A1C 9%, elevated BUN and cre. Nutrition Intervention &  Education: Educated patient on diabetes, weight loss diet with kidney disease guidelines. Encouraged the patient to eat at least 3 times per day, spacing meals no more than 4-5 hours apart. Pollyann Fat not skip meals to lose weight; this creates overeating at the next meal. Discussed the plate method: 1/2 the plate should include non-starchy vegetables, 1/4 plate should be lean protein, and 1/4 of plate should be carbohydrate. Provided the patient with list of macro-nutrient sources (CHO, pro, and fat) and appropriate amounts of CHO to be consumed at each meal and snack. Encouraged the patient to try using measuring cups  to familiarize with appropriate serving sizes. Follow renal guidelines set by nephrologist: eat lower sodium foods, and don't add salt at the table. Suggested the patient include protein source with all meals and snacks. Include more non-starchy vegetables and 2 fruit servings per day (eat a variety). Don't drink calories: avoid fruit juice, regular sodas, sweet tea, Gatorade. Eliminate/decrease fast food consumption. We discussed the importance of timing of meals. Discussed importance of 150 minutes per week physical activity. Made recommendations for an endocrinologist if patient feels he needs a diabetes specialist in the future (if A1C continues to rise); patient is concerned about neuropathy in his abdomen.       Handouts Provided: [x]  Carbohydrates  [x]  Protein  [x]  Non-starchy Vegetables  []  Food Label  [x]  Meal and Snack Ideas  []  Food Journals [x]  Diabetes  [x]  Cholesterol  [x] Sodium  [x]  Gen Nutr Guidelines  []  SBGM Guidelines  [x]  Others: My Healthy Plate, Kidney Disease guidelines   Information Reviewed with: Patient and patient's wife   Readiness to Change Stage: [x]  Pre-contemplative    []  Contemplative  []  Preparation               []  Action                  []  Maintenance   Potential Barriers to Learning: []  Decline in memory    []  Language barrier   []  Other:  []  Emotional                  []  Limited mobility  []  Lack of motivation     [] Vision, hearing or cognitive impairment   Expected Compliance: Fair to good; the patient likes to eat certain foods and I think he will make an effort to try some of these strategies but not all of them      Nutritional Goal - To promote lifestyle changes to result in:    [x]  Weight loss  [x]  Improved diabetic control  []  Decreased cholesterol levels  [x]  Decreased blood pressure  []  Weight maintenance []  Preventing any interactions associated with food allergies  []  Adequate weight gain toward goal weight  []  Other:        Patient Goals:   1. Eat on a schedule; eat breakfast within an hour of waking and then meals every 4-5 hours after that (don't just eat fruit for lunch)  2. Eat 3 balanced meals every day following plate method strategy   3. Watch sodium content of foods and don't add salt at the table  4. Consume only 2 servings of fruit daily (measure portion sizes )     Dietitian Signature: Shabbir Cooper RD,[facility] Date: 4/21/2021   Follow-up:  Will call for an appointment; wife wants to be present Time: 4:23 PM

## 2021-04-27 ENCOUNTER — OFFICE VISIT (OUTPATIENT)
Dept: FAMILY MEDICINE CLINIC | Age: 81
End: 2021-04-27
Payer: MEDICARE

## 2021-04-27 VITALS
RESPIRATION RATE: 18 BRPM | HEART RATE: 61 BPM | TEMPERATURE: 97.9 F | BODY MASS INDEX: 33.13 KG/M2 | SYSTOLIC BLOOD PRESSURE: 122 MMHG | DIASTOLIC BLOOD PRESSURE: 60 MMHG | HEIGHT: 68 IN | OXYGEN SATURATION: 96 % | WEIGHT: 218.6 LBS

## 2021-04-27 DIAGNOSIS — N18.32 TYPE 2 DIABETES MELLITUS WITH STAGE 3B CHRONIC KIDNEY DISEASE, WITH LONG-TERM CURRENT USE OF INSULIN (HCC): ICD-10-CM

## 2021-04-27 DIAGNOSIS — E11.22 TYPE 2 DIABETES MELLITUS WITH STAGE 3B CHRONIC KIDNEY DISEASE, WITH LONG-TERM CURRENT USE OF INSULIN (HCC): ICD-10-CM

## 2021-04-27 DIAGNOSIS — Z79.4 TYPE 2 DIABETES MELLITUS WITH STAGE 3B CHRONIC KIDNEY DISEASE, WITH LONG-TERM CURRENT USE OF INSULIN (HCC): ICD-10-CM

## 2021-04-27 DIAGNOSIS — Z00.00 MEDICARE ANNUAL WELLNESS VISIT, SUBSEQUENT: Primary | ICD-10-CM

## 2021-04-27 PROCEDURE — G8510 SCR DEP NEG, NO PLAN REQD: HCPCS | Performed by: INTERNAL MEDICINE

## 2021-04-27 PROCEDURE — G8417 CALC BMI ABV UP PARAM F/U: HCPCS | Performed by: INTERNAL MEDICINE

## 2021-04-27 PROCEDURE — G8754 DIAS BP LESS 90: HCPCS | Performed by: INTERNAL MEDICINE

## 2021-04-27 PROCEDURE — G8752 SYS BP LESS 140: HCPCS | Performed by: INTERNAL MEDICINE

## 2021-04-27 PROCEDURE — G8536 NO DOC ELDER MAL SCRN: HCPCS | Performed by: INTERNAL MEDICINE

## 2021-04-27 PROCEDURE — 3052F HG A1C>EQUAL 8.0%<EQUAL 9.0%: CPT | Performed by: INTERNAL MEDICINE

## 2021-04-27 PROCEDURE — 1101F PT FALLS ASSESS-DOCD LE1/YR: CPT | Performed by: INTERNAL MEDICINE

## 2021-04-27 PROCEDURE — G0439 PPPS, SUBSEQ VISIT: HCPCS | Performed by: INTERNAL MEDICINE

## 2021-04-27 PROCEDURE — G8427 DOCREV CUR MEDS BY ELIG CLIN: HCPCS | Performed by: INTERNAL MEDICINE

## 2021-04-27 RX ORDER — FLUTICASONE PROPIONATE 50 MCG
SPRAY, SUSPENSION (ML) NASAL
Qty: 3 BOTTLE | Refills: 3 | Status: SHIPPED | OUTPATIENT
Start: 2021-04-27 | End: 2022-07-10

## 2021-04-27 NOTE — PATIENT INSTRUCTIONS
Medicare Wellness Visit, Male The best way to live healthy is to have a lifestyle where you eat a well-balanced diet, exercise regularly, limit alcohol use, and quit all forms of tobacco/nicotine, if applicable. Regular preventive services are another way to keep healthy. Preventive services (vaccines, screening tests, monitoring & exams) can help personalize your care plan, which helps you manage your own care. Screening tests can find health problems at the earliest stages, when they are easiest to treat. [facility] follows the current, evidence-based guidelines published by the [facility] ([facility]) when recommending preventive services for our patients. Because we follow these guidelines, sometimes recommendations change over time as research supports it. (For example, a prostate screening blood test is no longer routinely recommended for men with no symptoms). Of course, you and your doctor may decide to screen more often for some diseases, based on your risk and co-morbidities (chronic disease you are already diagnosed with). Preventive services for you include: - Medicare offers their members a free annual wellness visit, which is time for you and your primary care provider to discuss and plan for your preventive service needs. Take advantage of this benefit every year! 
-All adults over age 72 should receive the recommended pneumonia vaccines. Current USPSTF guidelines recommend a series of two vaccines for the best pneumonia protection.  
-All adults should have a flu vaccine yearly and tetanus vaccine every 10 years. 
-All adults age 48 and older should receive the shingles vaccines (series of two vaccines).       
-All adults age 38-68 who are overweight should have a diabetes screening test once every three years.  
-Other screening tests & preventive services for persons with diabetes include: an eye exam to screen for diabetic retinopathy, a kidney function test, a foot exam, and stricter control over your cholesterol.  
-Cardiovascular screening for adults with routine risk involves an electrocardiogram (ECG) at intervals determined by the provider.  
-Colorectal cancer screening should be done for adults age 54-65 with no increased risk factors for colorectal cancer. There are a number of acceptable methods of screening for this type of cancer. Each test has its own benefits and drawbacks. Discuss with your provider what is most appropriate for you during your annual wellness visit. The different tests include: colonoscopy (considered the best screening method), a fecal occult blood test, a fecal DNA test, and sigmoidoscopy. 
-All adults born between [facility] should be screened once for Hepatitis C. 
-An Abdominal Aortic Aneurysm (AAA) Screening is recommended for men age 73-68 who has ever smoked in their lifetime. Here is a list of your current Health Maintenance items (your personalized list of preventive services) with a due date: 
Health Maintenance Due Topic Date Due  
 COVID-19 Vaccine (2 - Pfizer 2-dose series) 04/26/2021

## 2021-04-27 NOTE — PROGRESS NOTES
Kendra Jones is a 79 y.o. male (: 1940) presenting to address:    Chief Complaint   Patient presents with    Annual Wellness Visit       Vitals:    21 0906 21 0910   BP: (!) 166/65 122/60   Pulse: 61    Resp: 18    Temp: 97.9 °F (36.6 °C)    TempSrc: Temporal    SpO2: 96%    Weight: 218 lb 9.6 oz (99.2 kg)    Height: 5' 8\" (1.727 m)    PainSc:   5    PainLoc: Abdomen        Hearing/Vision:      Hearing Screening    125Hz 250Hz 500Hz 1000Hz 2000Hz 3000Hz 4000Hz 6000Hz 8000Hz   Right ear:            Left ear:               Visual Acuity Screening    Right eye Left eye Both eyes   Without correction:      With correction: 20/40 20/40 20/40       Learning Assessment:     Learning Assessment 2017   PRIMARY LEARNER Patient   HIGHEST LEVEL OF EDUCATION - PRIMARY LEARNER  4 YEARS OF COLLEGE   BARRIERS PRIMARY LEARNER NONE   CO-LEARNER CAREGIVER No   PRIMARY LANGUAGE ENGLISH    NEED No   LEARNER PREFERENCE PRIMARY VIDEOS   ANSWERED BY self   RELATIONSHIP SELF     Depression Screening:     3 most recent PHQ Screens 2021   Little interest or pleasure in doing things Not at all   Feeling down, depressed, irritable, or hopeless Not at all   Total Score PHQ 2 0     Fall Risk Assessment:     Fall Risk Assessment, last 12 mths 3/25/2021   Able to walk? Yes   Fall in past 12 months? 0   Do you feel unsteady? 0   Are you worried about falling 0   Number of falls in past 12 months -   Fall with injury? -     Abuse Screening:     Abuse Screening Questionnaire 3/25/2021   Do you ever feel afraid of your partner? N   Are you in a relationship with someone who physically or mentally threatens you? N   Is it safe for you to go home? Y     Coordination of Care Questionaire:   1. Have you been to the ER, urgent care clinic since your last visit? Hospitalized since your last visit? NO    2.  Have you seen or consulted any other health care providers outside of the [address] [place] [address] since your last visit? Include any pap smears or colon screening. NO    Advanced Directive:   1. Do you have an Advanced Directive? YES    2. Would you like information on Advanced Directives?  NO

## 2021-04-27 NOTE — PROGRESS NOTES
This is the Subsequent Medicare Annual Wellness Exam, performed 12 months or more after the Initial AWV or the last Subsequent AWV    I have reviewed the patient's medical history in detail and updated the computerized patient record. Assessment/Plan   Education and counseling provided:  Are appropriate based on today's review and evaluation  End-of-Life planning (with patient's consent), AMD on file, his wife is the health care proxy  Pt will get his second covid vaccine today  BMI 33, he was seen by Dietician recently, he lost 9 lbs since last visit a month ago, he will continue diet and weight loss  His fasting glucose has been  in the last week, on 20 units Lantus, he will continue monitoring his glucose. 1. Medicare annual wellness visit, subsequent  2. 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 E11.22, Normal, Disp-300 Strip, R-3       Depression Risk Factor Screening     3 most recent PHQ Screens 4/27/2021   Little interest or pleasure in doing things Not at all   Feeling down, depressed, irritable, or hopeless Not at all   Total Score PHQ 2 0       Alcohol Risk Screen    Do you average more than 1 drink per night or more than 7 drinks a week: No    In the past three months have you have had more than 4 drinks containing alcohol on one occasion: No        Functional Ability and Level of Safety    Hearing: Hearing is good. Activities of Daily Living: The home contains: handrails  Patient does total self care      Ambulation: with no difficulty     Fall Risk:  Fall Risk Assessment, last 12 mths 4/27/2021   Able to walk? Yes   Fall in past 12 months? 0   Do you feel unsteady? 0   Are you worried about falling 0   Number of falls in past 12 months -   Fall with injury?  -      Abuse Screen:  Patient is not abused       Cognitive Screening Has your family/caregiver stated any concerns about your memory: no     Cognitive Screening: AOx3, no memory loss. Health Maintenance Due     Health Maintenance Due   Topic Date Due    COVID-19 Vaccine (2 - Pfizer 2-dose series) 04/26/2021       Patient Care Team   Patient Care Team:  Grazyna Tyson MD as PCP - General (Internal Medicine)  Grazyna Tyson MD as PCP - [facility]  Roque Rivera MD (Nephrology)  Coreen Sierra MD (Urology)  Mervat Blanca NP (Nurse Practitioner)  Radha Vitale MD (Ophthalmology)  Guera Hadley MD (Surgery)  Kaci Joseph MD (Gastroenterology)  Coreen Sierra MD (Urology)    History     Patient Active Problem List   Diagnosis Code    CKD (chronic kidney disease) stage 3, GFR 30-59 ml/min ([facility]) N18.30    Essential hypertension I10    Elevated PSA R97.20    Benign prostatic hyperplasia with lower urinary tract symptoms N40.1    B12 deficiency E53.8    Type 2 diabetes mellitus with stage 3b chronic kidney disease, with long-term current use of insulin ([facility]) E11.22, N18.32, Z79.4    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 cholecystitis K80.21    Adenomatous polyp of descending colon D12.4    Localized edema R60.0    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 [address]     Elevated PSA     Hypercholesterolemia     Hypertension       Past Surgical History:   Procedure Laterality Date    HX APPENDECTOMY      16years old    [place] HX COLONOSCOPY  09/10/2014    HX ORTHOPAEDIC  1970s    r shoulder    HX TONSILLECTOMY      HX UROLOGICAL  02/06/2017    Prostate Bx: HGPIN left lateral mid. Dr. Summer Poe @ [facility].       Current Outpatient Medications   Medication Sig Dispense Refill    fluticasone propionate (FLONASE) 50 mcg/actuation nasal spray USE 2 SPRAYS IN EACH  NOSTRIL DAILY 3 Bottle 3    glucose blood VI test strips (OneTouch Ultra Test) strip Test blood glucose bid. E11.22 300 Strip 3    triamcinolone acetonide (KENALOG) 0.1 % topical cream Apply  to affected area two (2) times daily as needed for Skin Irritation or Itching. 454 g 3    gabapentin (NEURONTIN) 300 mg capsule Take 1 Cap by mouth three (3) times daily. Max Daily Amount: 900 mg. 270 Cap 0    Insulin Needles, Disposable, 31 gauge x 5/16\" ndle USE DAILY WITH LANTUS 100 Pen Needle 3    nebivoloL (Bystolic) 5 mg tablet TAKE 1 TABLET BY MOUTH  DAILY 90 Tab 3    SITagliptin (Januvia) 50 mg tablet TAKE 1 TABLET BY MOUTH  DAILY 90 Tab 3    hydrALAZINE (APRESOLINE) 100 mg tablet TAKE 1 TABLET BY MOUTH 3  TIMES DAILY 270 Tab 3    glimepiride (AMARYL) 1 mg tablet TAKE 1 TABLET BY MOUTH  DAILY BEFORE BREAKFAST 90 Tab 3    Lantus Solostar U-100 Insulin 100 unit/mL (3 mL) inpn INJECT SUBCUTANEOUSLY 20  UNITS DAILY 30 mL 3    acetaminophen (Tylenol Extra Strength) 500 mg tablet Take  by mouth every six (6) hours as needed for Pain.  tamsulosin (FLOMAX) 0.4 mg capsule TAKE 1 CAPSULE BY MOUTH  DAILY AFTER DINNER 90 Cap 3    atorvastatin (LIPITOR) 10 mg tablet TAKE 1 TABLET BY MOUTH  DAILY 90 Tab 3    furosemide (LASIX) 40 mg tablet TAKE 1 TABLET BY MOUTH  DAILY 90 Tab 3    olopatadine (PATADAY) 0.2 % drop ophthalmic solution Administer 1 Drop to both eyes daily. 7.5 mL 0    albuterol (PROVENTIL VENTOLIN) 2.5 mg /3 mL (0.083 %) nebulizer solution 3 mL by Nebulization route every four (4) hours as needed for Wheezing. 24 Each 1    albuterol (PROVENTIL HFA, VENTOLIN HFA, PROAIR HFA) 90 mcg/actuation inhaler Take 2 Puffs by inhalation every four (4) hours as needed for Wheezing. 1 Inhaler 11    cyanocobalamin 1,000 mcg tablet Take 1,000 mcg by mouth daily.       cholecalciferol (VITAMIN D3) 1,000 unit cap Take  by mouth daily.  vitamin E (AQUA GEMS) 400 unit capsule Take  by mouth daily.  aspirin 81 mg chewable tablet Take 81 mg by mouth daily.  cranberry extract 450 mg tab Take  by mouth.  ascorbic acid, vitamin C, (VITAMIN C) 500 mg tablet Take 1,000 mg by mouth.       OTHER Indications: Osteo Bi-Flex 1 per day       Allergies   Allergen Reactions    Norvasc [Amlodipine] Swelling     Patient states NKDA       Family History   Problem Relation Age of Onset    Kidney Disease Mother     Stroke Father      Social History     Tobacco Use    Smoking status: Never Smoker    Smokeless tobacco: Never Used   Substance Use Topics    Alcohol use: No         Chris Negrete MD

## 2021-05-11 RX ORDER — FUROSEMIDE 40 MG/1
TABLET ORAL
Qty: 90 TAB | Refills: 3 | Status: SHIPPED | OUTPATIENT
Start: 2021-05-11 | End: 2022-03-09

## 2021-05-15 ENCOUNTER — TELEPHONE (OUTPATIENT)
Dept: INTERNAL MEDICINE CLINIC | Age: 81
End: 2021-05-15

## 2021-05-15 DIAGNOSIS — M79.2 NEUROPATHIC PAIN: ICD-10-CM

## 2021-05-16 ENCOUNTER — PATIENT MESSAGE (OUTPATIENT)
Dept: FAMILY MEDICINE CLINIC | Age: 81
End: 2021-05-16

## 2021-05-16 NOTE — TELEPHONE ENCOUNTER
Received call from patient's wife. Reports that a few minutes ago, as he was walking to the bedroom, he was speaking to his wife and his speech became slurred, garbled, and slow. His wife states that she at first thought he was joking, but then realized that he was trying to communicate. She states that the episode lasted for approximately 1 minute and resolved. The patient was aware that he was having trouble speaking, and remembers what occurred. No confusion or LOC. Did not check his blood sugar, but states that he had a normal dinner and had not yet taken his Lantus dose this evening. No other complaints. Advised that patient should present to ED for evaluation given symptoms concerning for a possible TIA. Patient reluctant at first but then agreed.

## 2021-05-17 NOTE — TELEPHONE ENCOUNTER
Spoke to wife, scheduled same day follow up since pt was not going to ED. Pt was going to come unaccompanied. Asked that family accompany due to risk for driving. Spouse returned call. She is working, other family not available. She is willing to take pt to ED today for eval and made appt 5/19 for ED follow up.

## 2021-05-17 NOTE — TELEPHONE ENCOUNTER
From: Taylor Lancaster  To: Kaleb Chacko MD  Sent: 5/16/2021 9:29 PM EDT  Subject: Non-Urgent Medical Question    Dr. Tracie Hutson,  Saturday evening, around 10:30, my Jil Lynch [facility]) came into the bedroom from our sitting space and his face looked different like he was smirking and when he talked to me, his speech was slurred and slow. At first I thought he was teasing me, but my son-in-law recognized that it was stroke-like. Within a minute, it cleared up. Meena Castellanos said he did not feel odd but recognized that he was having difficulty with speech. We called your answering service & talked to Dr. Iqbal (?). She felt he had a mini-stroke (TIA)? Medina refused to go to the ER. His sugar level was 284 (high). I neglected to take his blood pressure (which I should have done). He has been fine since. Does he need follow up with you this week?  Lito

## 2021-05-19 LAB
CREATININE, EXTERNAL: 1.6
HBA1C MFR BLD HPLC: 6.5 %
LDL-C, EXTERNAL: 60

## 2021-05-21 ENCOUNTER — OFFICE VISIT (OUTPATIENT)
Dept: FAMILY MEDICINE CLINIC | Age: 81
End: 2021-05-21
Payer: MEDICARE

## 2021-05-21 VITALS
RESPIRATION RATE: 18 BRPM | HEIGHT: 68 IN | BODY MASS INDEX: 32.95 KG/M2 | DIASTOLIC BLOOD PRESSURE: 61 MMHG | SYSTOLIC BLOOD PRESSURE: 123 MMHG | TEMPERATURE: 97.9 F | HEART RATE: 67 BPM | WEIGHT: 217.4 LBS | OXYGEN SATURATION: 95 %

## 2021-05-21 DIAGNOSIS — I10 ESSENTIAL HYPERTENSION: ICD-10-CM

## 2021-05-21 DIAGNOSIS — N18.32 TYPE 2 DIABETES MELLITUS WITH STAGE 3B CHRONIC KIDNEY DISEASE, WITH LONG-TERM CURRENT USE OF INSULIN (HCC): ICD-10-CM

## 2021-05-21 DIAGNOSIS — R47.81 SLURRED SPEECH: ICD-10-CM

## 2021-05-21 DIAGNOSIS — R10.11 CHRONIC BILATERAL UPPER ABDOMINAL PAIN: ICD-10-CM

## 2021-05-21 DIAGNOSIS — E11.22 TYPE 2 DIABETES MELLITUS WITH STAGE 3B CHRONIC KIDNEY DISEASE, WITH LONG-TERM CURRENT USE OF INSULIN (HCC): ICD-10-CM

## 2021-05-21 DIAGNOSIS — R10.12 CHRONIC BILATERAL UPPER ABDOMINAL PAIN: ICD-10-CM

## 2021-05-21 DIAGNOSIS — G89.29 CHRONIC BILATERAL UPPER ABDOMINAL PAIN: ICD-10-CM

## 2021-05-21 DIAGNOSIS — Z79.4 TYPE 2 DIABETES MELLITUS WITH STAGE 3B CHRONIC KIDNEY DISEASE, WITH LONG-TERM CURRENT USE OF INSULIN (HCC): ICD-10-CM

## 2021-05-21 DIAGNOSIS — G45.9 TIA (TRANSIENT ISCHEMIC ATTACK): Primary | ICD-10-CM

## 2021-05-21 DIAGNOSIS — E78.2 MIXED HYPERLIPIDEMIA: ICD-10-CM

## 2021-05-21 DIAGNOSIS — M79.2 NEUROPATHIC PAIN: ICD-10-CM

## 2021-05-21 PROCEDURE — G8510 SCR DEP NEG, NO PLAN REQD: HCPCS | Performed by: INTERNAL MEDICINE

## 2021-05-21 PROCEDURE — 99214 OFFICE O/P EST MOD 30 MIN: CPT | Performed by: INTERNAL MEDICINE

## 2021-05-21 PROCEDURE — G8427 DOCREV CUR MEDS BY ELIG CLIN: HCPCS | Performed by: INTERNAL MEDICINE

## 2021-05-21 PROCEDURE — G8754 DIAS BP LESS 90: HCPCS | Performed by: INTERNAL MEDICINE

## 2021-05-21 PROCEDURE — G8536 NO DOC ELDER MAL SCRN: HCPCS | Performed by: INTERNAL MEDICINE

## 2021-05-21 PROCEDURE — G8752 SYS BP LESS 140: HCPCS | Performed by: INTERNAL MEDICINE

## 2021-05-21 PROCEDURE — 1101F PT FALLS ASSESS-DOCD LE1/YR: CPT | Performed by: INTERNAL MEDICINE

## 2021-05-21 PROCEDURE — G8417 CALC BMI ABV UP PARAM F/U: HCPCS | Performed by: INTERNAL MEDICINE

## 2021-05-21 NOTE — PROGRESS NOTES
Rahul Ramirez is a 79 y.o. male (: 1940) presenting to address:    Chief Complaint   Patient presents with   [facility] Follow Up       Vitals:    21 0939 21 0945   BP: (!) 122/56 123/61   Pulse: 67    Resp: 18    Temp: 97.9 °F (36.6 °C)    TempSrc: Temporal    SpO2: 95%    Weight: 217 lb 6.4 oz (98.6 kg)    Height: 5' 8\" (1.727 m)    PainSc:   0 - No pain        Hearing/Vision:   No exam data present    Learning Assessment:     Learning Assessment 2017   PRIMARY LEARNER Patient   HIGHEST LEVEL OF EDUCATION - PRIMARY LEARNER  4 YEARS OF COLLEGE   BARRIERS PRIMARY LEARNER NONE   CO-LEARNER CAREGIVER No   PRIMARY LANGUAGE ENGLISH    NEED No   LEARNER PREFERENCE PRIMARY VIDEOS   ANSWERED BY self   RELATIONSHIP SELF     Depression Screening:     3 most recent PHQ Screens 2021   Little interest or pleasure in doing things Not at all   Feeling down, depressed, irritable, or hopeless Not at all   Total Score PHQ 2 0     Fall Risk Assessment:     Fall Risk Assessment, last 12 mths 2021   Able to walk? Yes   Fall in past 12 months? 0   Do you feel unsteady? 0   Are you worried about falling 0   Number of falls in past 12 months -   Fall with injury? -     Abuse Screening:     Abuse Screening Questionnaire 2021   Do you ever feel afraid of your partner? N   Are you in a relationship with someone who physically or mentally threatens you? N   Is it safe for you to go home? Y     Coordination of Care Questionaire:   1. Have you been to the ER, urgent care clinic since your last visit? Hospitalized since your last visit? YES- [facility]    2. Have you seen or consulted any other health care providers outside of the [address] [place] [address] since your last visit? Include any pap smears or colon screening. NO    Advanced Directive:   1. Do you have an Advanced Directive? YES    2. Would you like information on Advanced Directives?  NO

## 2021-05-21 NOTE — PROGRESS NOTES
HISTORY OF PRESENT ILLNESS  Martha Bhatt is a 79 y.o. male. HPI  In for hospital follow up  TIA/Slurred speech, pt had slurred speech for about 2 minutes last weekend, resolved spontaneously, no legs/arms weakness, no LOC, no tingling or numbness, no blurred or double vision, he went to the ER on 5-18 and was discharged on 5-19-21, he had negative w/u, his D/C summary noted today  MRI and MRA of the brain were negative for acute abnormality  PVL of carotid showed mild <50% stenosis  His Echo showed LVEF 55%  His ct of the head showed no acute abnormality  No further episodes since discharged  DM, stable now, he is compliant with diet, his recent A1c was 6.5 on 3-, glucose about 120-130 in am  HTN, stable, bp is well controlled  HLD, well controlled, recent LDL was 60 on 5-19-21  Chronic upper abd pain, ? Neuropathy, had negative W/U, followed by GI, on neurontin which does not help sometimes, was told by GI to see pain management  Review of Systems   Constitutional: Negative for chills and fever. Respiratory: Negative for cough and shortness of breath. Cardiovascular: Negative for chest pain and palpitations. Neurological: Negative for dizziness, tingling, focal weakness, seizures, loss of consciousness, weakness and headaches. Physical Exam  Vitals reviewed. Cardiovascular:      Rate and Rhythm: Normal rate and regular rhythm. Heart sounds: Normal heart sounds. Pulmonary:      Effort: Pulmonary effort is normal.      Breath sounds: Normal breath sounds. Abdominal:      Palpations: Abdomen is soft. Tenderness: There is abdominal tenderness (LUQ and RUQ, chronic). Neurological:      Mental Status: He is oriented to person, place, and time. Motor: No weakness. Gait: Gait normal.         ASSESSMENT and PLAN  Diagnoses and all orders for this visit:    1. TIA (transient ischemic attack), resolved, continue ASA/Lipitor    2. Slurred speech, 2/2 #1, resolved    3.  Type 2 diabetes mellitus with stage 3b chronic kidney disease, with long-term current use of insulin ([facility] [address] controlled, continue current meds    4. Essential hypertension, controlled, continue current meds    5. Mixed hyperlipidemia, well controlled, continue Lipitor    6. Chronic bilateral upper abdominal pain  -     REFERRAL TO PAIN MANAGEMENT    7. Neuropathic pain  -     REFERRAL TO PAIN MANAGEMENT     He has refills on meds for now.         Results for orders placed or performed in visit on 05/21/21   AMB EXT LDL-C   Result Value Ref Range    LDL-C, External 60    AMB EXT CREATININE   Result Value Ref Range    Creatinine, External 1.6    AMB EXT HGBA1C   Result Value Ref Range    Hemoglobin A1c, External 6.5 %

## 2021-06-08 DIAGNOSIS — E11.22 TYPE 2 DIABETES MELLITUS WITH STAGE 3 CHRONIC KIDNEY DISEASE, WITHOUT LONG-TERM CURRENT USE OF INSULIN, UNSPECIFIED WHETHER STAGE 3A OR 3B CKD (HCC): ICD-10-CM

## 2021-06-08 DIAGNOSIS — N18.30 TYPE 2 DIABETES MELLITUS WITH STAGE 3 CHRONIC KIDNEY DISEASE, WITHOUT LONG-TERM CURRENT USE OF INSULIN, UNSPECIFIED WHETHER STAGE 3A OR 3B CKD (HCC): ICD-10-CM

## 2021-06-08 RX ORDER — PEN NEEDLE, DIABETIC 30 GX3/16"
NEEDLE, DISPOSABLE MISCELLANEOUS
Qty: 100 PEN NEEDLE | Refills: 3 | Status: SHIPPED | OUTPATIENT
Start: 2021-06-08

## 2021-06-17 RX ORDER — GABAPENTIN 300 MG/1
CAPSULE ORAL
Qty: 270 CAPSULE | Refills: 1 | Status: SHIPPED | OUTPATIENT
Start: 2021-06-17 | End: 2021-11-03

## 2021-07-14 RX ORDER — ATORVASTATIN CALCIUM 10 MG/1
TABLET, FILM COATED ORAL
Qty: 90 TABLET | Refills: 1 | Status: SHIPPED | OUTPATIENT
Start: 2021-07-14 | End: 2022-01-01

## 2021-07-14 RX ORDER — TAMSULOSIN HYDROCHLORIDE 0.4 MG/1
0.4 CAPSULE ORAL DAILY
Qty: 90 CAPSULE | Refills: 1 | Status: SHIPPED | OUTPATIENT
Start: 2021-07-14 | End: 2021-08-07 | Stop reason: SDUPTHER

## 2021-07-14 NOTE — TELEPHONE ENCOUNTER
Future Appointments   Date Time Provider Chrissy Walton   8/3/2021  7:30 AM Florentin Dave MD [facility] BS AMB   8/4/2021 10:00 AM Racheal Avalos RD [facility]   11/3/2021  8:00 AM Thais Villeda MD [facility] BS AMB     Last written 8/21/2020.

## 2021-08-04 ENCOUNTER — APPOINTMENT (OUTPATIENT)
Dept: NUTRITION | Age: 81
End: 2021-08-04

## 2021-08-10 ENCOUNTER — IMPORTED ENCOUNTER (OUTPATIENT)
Dept: URBAN - METROPOLITAN AREA CLINIC 1 | Facility: CLINIC | Age: 81
End: 2021-08-10

## 2021-08-10 PROBLEM — Z79.84: Noted: 2021-08-10

## 2021-08-10 PROBLEM — Z96.1: Noted: 2021-08-10

## 2021-08-10 PROBLEM — E11.9: Noted: 2021-08-10

## 2021-08-10 PROBLEM — H04.123: Noted: 2021-08-10

## 2021-08-10 PROBLEM — H01.004: Noted: 2021-08-10

## 2021-08-10 PROBLEM — H40.013: Noted: 2021-08-10

## 2021-08-10 PROBLEM — H01.001: Noted: 2021-08-10

## 2021-08-10 PROBLEM — H26.493: Noted: 2021-08-10

## 2021-08-10 PROBLEM — H16.143: Noted: 2021-08-10

## 2021-08-10 PROCEDURE — 92133 CPTRZD OPH DX IMG PST SGM ON: CPT

## 2021-08-10 PROCEDURE — 99214 OFFICE O/P EST MOD 30 MIN: CPT

## 2021-08-10 RX ORDER — TAMSULOSIN HYDROCHLORIDE 0.4 MG/1
0.4 CAPSULE ORAL DAILY
Qty: 90 CAPSULE | Refills: 1 | Status: SHIPPED | OUTPATIENT
Start: 2021-08-10 | End: 2022-03-23

## 2021-08-10 NOTE — PATIENT DISCUSSION
1.  DM Type II (Oral Medication) without sign of diabetic retinopathy and no blot heme on dilated retinal examination today OU No Macular Edema:  Discussed the pathophysiology of diabetes and its effect on the eye and risk of blindness. Stressed the importance of strong glucose control. Advised of importance of at least yearly dilated examinations but to contact us immediately for any problems or concerns. 2. Glaucoma Suspect OU (CD 0.80/0.65): OCT WNL OU. IOP stable. Patient is considered Low Risk. Condition was discussed with patient and patient understands. Will continue to monitor patient for any progression in condition. Patient was advised to call us with any problems questions or concerns. 3.  DOMINGUEZ w/ PEK OU- Recommend ATs TID OU routinely 4. Anterior Blepharitis OU - Daily Hot compresses and lid scrubs were recommended. 5. PCO OU: (Posterior Capsule Opacification)   Observe and consider yag cap when pt feels pco visually significant and visual acuity decreases to appropriate level. 6. Pseudophakia OU - (Standard OU) *Myopic Goal*. Patient deferred Manifest Rx today. Return for an appointment in 1 year 27 with Dr. Omar Nam.

## 2021-08-12 ENCOUNTER — TELEPHONE (OUTPATIENT)
Dept: FAMILY MEDICINE CLINIC | Age: 81
End: 2021-08-12

## 2021-08-12 NOTE — TELEPHONE ENCOUNTER
Wife Ayesha Cancer called stating that  is coughing up green mucous and he's O2 is 89%. I advised wife that we are not seeing patient's in office with coughing or any respiratory issues. Advised ER/UR but wife stated that patient was exposed to Covid but was tested negative. So I did advise wife she could take him to Urgent Care since we can't see him in office, because she wants a chest xray for him.

## 2021-08-12 NOTE — TELEPHONE ENCOUNTER
Pt's wife called wanting to get him in to be seen for coughing up green mucous and low oxygen at 89. Transferred to Rachel Dyer to triage.

## 2021-08-26 NOTE — PATIENT DISCUSSION
"Prescribed artificial tears. Warned of possible ""flare ups"" associated with inflammation of pinguecula. Can send send a steroidal as needed for ""bad"" days. No surgical intervention at this time. "

## 2021-10-20 DIAGNOSIS — E11.22 TYPE 2 DIABETES MELLITUS WITH STAGE 3B CHRONIC KIDNEY DISEASE, WITH LONG-TERM CURRENT USE OF INSULIN (HCC): Primary | ICD-10-CM

## 2021-10-20 DIAGNOSIS — N18.32 TYPE 2 DIABETES MELLITUS WITH STAGE 3B CHRONIC KIDNEY DISEASE, WITH LONG-TERM CURRENT USE OF INSULIN (HCC): Primary | ICD-10-CM

## 2021-10-20 DIAGNOSIS — N18.30 TYPE 2 DIABETES MELLITUS WITH STAGE 3 CHRONIC KIDNEY DISEASE, WITHOUT LONG-TERM CURRENT USE OF INSULIN (HCC): ICD-10-CM

## 2021-10-20 DIAGNOSIS — E11.22 TYPE 2 DIABETES MELLITUS WITH STAGE 3 CHRONIC KIDNEY DISEASE, WITHOUT LONG-TERM CURRENT USE OF INSULIN (HCC): ICD-10-CM

## 2021-10-20 DIAGNOSIS — I10 ESSENTIAL HYPERTENSION: ICD-10-CM

## 2021-10-20 DIAGNOSIS — Z79.4 TYPE 2 DIABETES MELLITUS WITH STAGE 3B CHRONIC KIDNEY DISEASE, WITH LONG-TERM CURRENT USE OF INSULIN (HCC): Primary | ICD-10-CM

## 2021-10-20 RX ORDER — HYDRALAZINE HYDROCHLORIDE 100 MG/1
100 TABLET, FILM COATED ORAL 3 TIMES DAILY
Qty: 270 TABLET | Refills: 1 | Status: SHIPPED | OUTPATIENT
Start: 2021-10-20 | End: 2022-04-28 | Stop reason: SDUPTHER

## 2021-10-20 RX ORDER — NEBIVOLOL 5 MG/1
TABLET ORAL
Qty: 90 TABLET | Refills: 3 | Status: SHIPPED | OUTPATIENT
Start: 2021-10-20 | End: 2022-09-18

## 2021-10-20 NOTE — TELEPHONE ENCOUNTER
Future Appointments   Date Time Provider Chrissy Walton   11/3/2021  8:00 AM Jose Villeda MD [facility] BS AMB

## 2021-11-03 ENCOUNTER — OFFICE VISIT (OUTPATIENT)
Dept: FAMILY MEDICINE CLINIC | Age: 81
End: 2021-11-03
Payer: MEDICARE

## 2021-11-03 ENCOUNTER — APPOINTMENT (OUTPATIENT)
Dept: FAMILY MEDICINE CLINIC | Age: 81
End: 2021-11-03

## 2021-11-03 ENCOUNTER — HOSPITAL ENCOUNTER (OUTPATIENT)
Dept: LAB | Age: 81
Discharge: HOME | End: 2021-11-03
Payer: MEDICARE

## 2021-11-03 VITALS
WEIGHT: 219.6 LBS | BODY MASS INDEX: 33.28 KG/M2 | DIASTOLIC BLOOD PRESSURE: 60 MMHG | HEIGHT: 68 IN | RESPIRATION RATE: 18 BRPM | TEMPERATURE: 98 F | OXYGEN SATURATION: 97 % | HEART RATE: 52 BPM | SYSTOLIC BLOOD PRESSURE: 150 MMHG

## 2021-11-03 DIAGNOSIS — Z79.4 TYPE 2 DIABETES MELLITUS WITH STAGE 3B CHRONIC KIDNEY DISEASE, WITH LONG-TERM CURRENT USE OF INSULIN (HCC): Primary | ICD-10-CM

## 2021-11-03 DIAGNOSIS — E78.2 MIXED HYPERLIPIDEMIA: ICD-10-CM

## 2021-11-03 DIAGNOSIS — E11.22 TYPE 2 DIABETES MELLITUS WITH STAGE 3B CHRONIC KIDNEY DISEASE, WITH LONG-TERM CURRENT USE OF INSULIN (HCC): ICD-10-CM

## 2021-11-03 DIAGNOSIS — Z79.4 TYPE 2 DIABETES MELLITUS WITH STAGE 3B CHRONIC KIDNEY DISEASE, WITH LONG-TERM CURRENT USE OF INSULIN (HCC): ICD-10-CM

## 2021-11-03 DIAGNOSIS — N18.32 TYPE 2 DIABETES MELLITUS WITH STAGE 3B CHRONIC KIDNEY DISEASE, WITH LONG-TERM CURRENT USE OF INSULIN (HCC): Primary | ICD-10-CM

## 2021-11-03 DIAGNOSIS — E11.22 TYPE 2 DIABETES MELLITUS WITH STAGE 3B CHRONIC KIDNEY DISEASE, WITH LONG-TERM CURRENT USE OF INSULIN (HCC): Primary | ICD-10-CM

## 2021-11-03 DIAGNOSIS — I10 ESSENTIAL HYPERTENSION: ICD-10-CM

## 2021-11-03 DIAGNOSIS — E53.8 B12 DEFICIENCY: ICD-10-CM

## 2021-11-03 DIAGNOSIS — N18.32 TYPE 2 DIABETES MELLITUS WITH STAGE 3B CHRONIC KIDNEY DISEASE, WITH LONG-TERM CURRENT USE OF INSULIN (HCC): ICD-10-CM

## 2021-11-03 DIAGNOSIS — Z23 NEEDS FLU SHOT: ICD-10-CM

## 2021-11-03 PROBLEM — N18.30 TYPE 2 DIABETES MELLITUS WITH STAGE 3 CHRONIC KIDNEY DISEASE, WITHOUT LONG-TERM CURRENT USE OF INSULIN (HCC): Status: ACTIVE | Noted: 2021-11-03

## 2021-11-03 LAB
ALBUMIN SERPL-MCNC: 3.7 G/DL (ref 3.4–5)
ALBUMIN/GLOB SERPL: 1.2 {RATIO} (ref 0.8–1.7)
ALP SERPL-CCNC: 59 U/L (ref 45–117)
ALT SERPL-CCNC: 20 U/L (ref 16–61)
ANION GAP SERPL CALC-SCNC: 5 MMOL/L (ref 3–18)
AST SERPL-CCNC: 21 U/L (ref 10–38)
BASOPHILS # BLD: 0.1 K/UL (ref 0–0.1)
BASOPHILS NFR BLD: 1 % (ref 0–2)
BILIRUB SERPL-MCNC: 0.6 MG/DL (ref 0.2–1)
BUN SERPL-MCNC: 54 MG/DL (ref 7–18)
BUN/CREAT SERPL: 25 (ref 12–20)
CALCIUM SERPL-MCNC: 9.3 MG/DL (ref 8.5–10.1)
CHLORIDE SERPL-SCNC: 110 MMOL/L (ref 100–111)
CHOLEST SERPL-MCNC: 125 MG/DL
CO2 SERPL-SCNC: 27 MMOL/L (ref 21–32)
CREAT SERPL-MCNC: 2.13 MG/DL (ref 0.6–1.3)
DIFFERENTIAL METHOD BLD: ABNORMAL
EOSINOPHIL # BLD: 0.2 K/UL (ref 0–0.4)
EOSINOPHIL NFR BLD: 2 % (ref 0–5)
ERYTHROCYTE [DISTWIDTH] IN BLOOD BY AUTOMATED COUNT: 12.2 % (ref 11.6–14.5)
EST. AVERAGE GLUCOSE BLD GHB EST-MCNC: 134 MG/DL
GLOBULIN SER CALC-MCNC: 3.1 G/DL (ref 2–4)
GLUCOSE SERPL-MCNC: 57 MG/DL (ref 74–99)
HBA1C MFR BLD: 6.3 % (ref 4.2–5.6)
HCT VFR BLD AUTO: 38.5 % (ref 36–48)
HDLC SERPL-MCNC: 38 MG/DL (ref 40–60)
HDLC SERPL: 3.3 {RATIO} (ref 0–5)
HGB BLD-MCNC: 11.7 G/DL (ref 13–16)
LDLC SERPL CALC-MCNC: 71.8 MG/DL (ref 0–100)
LIPID PROFILE,FLP: ABNORMAL
LYMPHOCYTES # BLD: 1.4 K/UL (ref 0.9–3.6)
LYMPHOCYTES NFR BLD: 22 % (ref 21–52)
MCH RBC QN AUTO: 29.3 PG (ref 24–34)
MCHC RBC AUTO-ENTMCNC: 30.4 G/DL (ref 31–37)
MCV RBC AUTO: 96.5 FL (ref 78–100)
MONOCYTES # BLD: 0.6 K/UL (ref 0.05–1.2)
MONOCYTES NFR BLD: 10 % (ref 3–10)
NEUTS SEG # BLD: 4.2 K/UL (ref 1.8–8)
NEUTS SEG NFR BLD: 65 % (ref 40–73)
PLATELET # BLD AUTO: 246 K/UL (ref 135–420)
PMV BLD AUTO: 9.8 FL (ref 9.2–11.8)
POTASSIUM SERPL-SCNC: 5 MMOL/L (ref 3.5–5.5)
PROT SERPL-MCNC: 6.8 G/DL (ref 6.4–8.2)
RBC # BLD AUTO: 3.99 M/UL (ref 4.35–5.65)
SODIUM SERPL-SCNC: 142 MMOL/L (ref 136–145)
TRIGL SERPL-MCNC: 76 MG/DL (ref ?–150)
VIT B12 SERPL-MCNC: 1615 PG/ML (ref 211–911)
VLDLC SERPL CALC-MCNC: 15.2 MG/DL
WBC # BLD AUTO: 6.5 K/UL (ref 4.6–13.2)

## 2021-11-03 PROCEDURE — 80061 LIPID PANEL: CPT

## 2021-11-03 PROCEDURE — G0008 ADMIN INFLUENZA VIRUS VAC: HCPCS | Performed by: INTERNAL MEDICINE

## 2021-11-03 PROCEDURE — G8510 SCR DEP NEG, NO PLAN REQD: HCPCS | Performed by: INTERNAL MEDICINE

## 2021-11-03 PROCEDURE — 80053 COMPREHEN METABOLIC PANEL: CPT

## 2021-11-03 PROCEDURE — 36415 COLL VENOUS BLD VENIPUNCTURE: CPT

## 2021-11-03 PROCEDURE — G8536 NO DOC ELDER MAL SCRN: HCPCS | Performed by: INTERNAL MEDICINE

## 2021-11-03 PROCEDURE — G8427 DOCREV CUR MEDS BY ELIG CLIN: HCPCS | Performed by: INTERNAL MEDICINE

## 2021-11-03 PROCEDURE — G8417 CALC BMI ABV UP PARAM F/U: HCPCS | Performed by: INTERNAL MEDICINE

## 2021-11-03 PROCEDURE — G8754 DIAS BP LESS 90: HCPCS | Performed by: INTERNAL MEDICINE

## 2021-11-03 PROCEDURE — 99214 OFFICE O/P EST MOD 30 MIN: CPT | Performed by: INTERNAL MEDICINE

## 2021-11-03 PROCEDURE — 1101F PT FALLS ASSESS-DOCD LE1/YR: CPT | Performed by: INTERNAL MEDICINE

## 2021-11-03 PROCEDURE — 83036 HEMOGLOBIN GLYCOSYLATED A1C: CPT

## 2021-11-03 PROCEDURE — 85025 COMPLETE CBC W/AUTO DIFF WBC: CPT

## 2021-11-03 PROCEDURE — G8753 SYS BP > OR = 140: HCPCS | Performed by: INTERNAL MEDICINE

## 2021-11-03 PROCEDURE — 82607 VITAMIN B-12: CPT

## 2021-11-03 PROCEDURE — 90694 VACC AIIV4 NO PRSRV 0.5ML IM: CPT | Performed by: INTERNAL MEDICINE

## 2021-11-03 RX ORDER — INSULIN GLARGINE 100 [IU]/ML
INJECTION, SOLUTION SUBCUTANEOUS
Qty: 30 ML | Refills: 3 | Status: SHIPPED | OUTPATIENT
Start: 2021-11-03

## 2021-11-03 NOTE — PROGRESS NOTES
HISTORY OF PRESENT ILLNESS  Chayo Rico is a [de-identified] y.o. male. HPI  DM with CKD, stable, glucose is  on average in am, last a1c was 6.5  HLD, controlled on Lipitor daily  HTN, bp is elevated today, his BP is ok at home monitor, he did not take his meds this morning  Vit b12 def, stable on vit b12 OTC 3 days a week. Followed by Nephrology for his CKD  Review of Systems   Constitutional: Negative for fever. Respiratory: Negative for cough and shortness of breath. Cardiovascular: Negative for chest pain. Gastrointestinal: Negative for abdominal pain. Physical Exam  Vitals reviewed. Cardiovascular:      Rate and Rhythm: Normal rate and regular rhythm. Heart sounds: Normal heart sounds. No murmur heard. Pulmonary:      Effort: Pulmonary effort is normal.      Breath sounds: Normal breath sounds. Abdominal:      Palpations: Abdomen is soft. Tenderness: There is no abdominal tenderness. Musculoskeletal:      Right lower leg: No edema. Left lower leg: No edema. Neurological:      Mental Status: He is oriented to person, place, and time. ASSESSMENT and PLAN  Diagnoses and all orders for this visit:    1. Type 2 diabetes mellitus with stage 3b chronic kidney disease, with long-term current use of insulin ([address] stable  -     METABOLIC PANEL, COMPREHENSIVE; Future  -     HEMOGLOBIN A1C WITH EAG; Future  -     insulin glargine (Lantus Solostar U-100 Insulin) 100 unit/mL (3 mL) inpn; INJECT SUBCUTANEOUSLY 20  UNITS DAILY    2. Mixed hyperlipidemia, stable  -     LIPID PANEL; Future  -     METABOLIC PANEL, COMPREHENSIVE; Future    3. Essential hypertension, will continue to monitor BP for now, follow up in 2 weeks for recheck and he will bring his home meter with him. -     METABOLIC PANEL, COMPREHENSIVE; Future    4. B12 deficiency, stable  -     VITAMIN B12; Future  -     CBC WITH AUTOMATED DIFF; Future    5.  Needs flu shot  -     FLU (FLUAD QUAD INFLUENZA VACCINE,QUAD,ADJUVANTED)    Continue current meds  Follow-up and Dispositions    · Return in about 2 weeks (around 11/17/2021).        Lab Results   Component Value Date/Time    Hemoglobin A1c 7.8 (H) 09/04/2019 08:14 AM    Hemoglobin A1c (POC) 9.0 03/25/2021 08:39 AM    Hemoglobin A1c, External 6.5 05/19/2021 12:00 AM     Lab Results   Component Value Date/Time    Cholesterol, total 137 03/25/2021 09:11 AM    HDL Cholesterol 44 03/25/2021 09:11 AM    LDL, calculated 78.4 03/25/2021 09:11 AM    VLDL, calculated 14.6 03/25/2021 09:11 AM    Triglyceride 73 03/25/2021 09:11 AM    CHOL/HDL Ratio 3.1 03/25/2021 09:11 AM

## 2021-11-03 NOTE — PROGRESS NOTES
Elle Barfield is a [de-identified] y.o. male (: 1940) presenting to address:    Chief Complaint   Patient presents with    Medication Evaluation       Vitals:    21 0800 21 0807   BP: (!) 167/73 (!) 165/67   Pulse: (!) 52    Resp: 18    Temp: 98 °F (36.7 °C)    SpO2: 97%    Weight: 219 lb 9.6 oz (99.6 kg)    Height: 5' 8\" (1.727 m)    PainSc:   0 - No pain        Hearing/Vision:   No exam data present    Learning Assessment:     Learning Assessment 2017   PRIMARY LEARNER Patient   HIGHEST LEVEL OF EDUCATION - PRIMARY LEARNER  4 YEARS OF COLLEGE   BARRIERS PRIMARY LEARNER NONE   CO-LEARNER CAREGIVER No   PRIMARY LANGUAGE ENGLISH    NEED No   LEARNER PREFERENCE PRIMARY VIDEOS   ANSWERED BY self   RELATIONSHIP SELF     Depression Screening:     3 most recent PHQ Screens 11/3/2021   Little interest or pleasure in doing things Not at all   Feeling down, depressed, irritable, or hopeless Not at all   Total Score PHQ 2 0     Fall Risk Assessment:     Fall Risk Assessment, last 12 mths 11/3/2021   Able to walk? Yes   Fall in past 12 months? 0   Do you feel unsteady? 0   Are you worried about falling 0   Number of falls in past 12 months -   Fall with injury? -     Abuse Screening:     Abuse Screening Questionnaire 2021   Do you ever feel afraid of your partner? N   Are you in a relationship with someone who physically or mentally threatens you? N   Is it safe for you to go home? Y     Coordination of Care Questionaire:   1. Have you been to the ER, urgent care clinic since your last visit? Hospitalized since your last visit? No     2. Have you seen or consulted any other health care providers outside of the [address] [place] [address] since your last visit? Include any pap smears or colon screening  Yes Dr Lyudmila Ortiz ,stopped gabapentin     Advanced Directive:   1. Do you have an Advanced Directive? Yes  2. Would you like information on Advanced Directives?    No       Health Maintenance Due Topic Date Due    Flu Vaccine (1) 09/01/2021    COVID-19 Vaccine (3 - Pfizer booster) 10/27/2021     Wants flu vaccine   Immunization/s administered 11/3/2021 by Lorraine Rader LPN with guardian's consent. Patient tolerated procedure well. No reactions noted.     fluad rt deltoid

## 2021-11-05 NOTE — PROGRESS NOTES
Vit B12 level is still elevated, please decrease vit B12 dose to 500 mcg on M,W and Friday  Hgb is slightly low @ 11.7, but better than last level @ 11.5 when was done at [facility] in May, Kidneys are stable stage 3 CKD, please fax copy of labs to Nephrology  A1c 6.3, DM is well controlled

## 2021-11-05 NOTE — PROGRESS NOTES
Reached pt. He verbalized understanding, will  new bottle of B12 500 mcg for three day per week dosing. Faxing results to nephro.

## 2021-11-17 ENCOUNTER — OFFICE VISIT (OUTPATIENT)
Dept: FAMILY MEDICINE CLINIC | Age: 81
End: 2021-11-17
Payer: MEDICARE

## 2021-11-17 VITALS
TEMPERATURE: 97.4 F | DIASTOLIC BLOOD PRESSURE: 60 MMHG | HEART RATE: 57 BPM | BODY MASS INDEX: 31.1 KG/M2 | WEIGHT: 205.2 LBS | HEIGHT: 68 IN | OXYGEN SATURATION: 96 % | RESPIRATION RATE: 16 BRPM | SYSTOLIC BLOOD PRESSURE: 118 MMHG

## 2021-11-17 DIAGNOSIS — E11.22 TYPE 2 DIABETES MELLITUS WITH STAGE 3B CHRONIC KIDNEY DISEASE, WITH LONG-TERM CURRENT USE OF INSULIN (HCC): ICD-10-CM

## 2021-11-17 DIAGNOSIS — N18.32 TYPE 2 DIABETES MELLITUS WITH STAGE 3B CHRONIC KIDNEY DISEASE, WITH LONG-TERM CURRENT USE OF INSULIN (HCC): ICD-10-CM

## 2021-11-17 DIAGNOSIS — I10 ESSENTIAL HYPERTENSION: Primary | ICD-10-CM

## 2021-11-17 DIAGNOSIS — E78.2 MIXED HYPERLIPIDEMIA: ICD-10-CM

## 2021-11-17 DIAGNOSIS — Z79.4 TYPE 2 DIABETES MELLITUS WITH STAGE 3B CHRONIC KIDNEY DISEASE, WITH LONG-TERM CURRENT USE OF INSULIN (HCC): ICD-10-CM

## 2021-11-17 DIAGNOSIS — E53.8 B12 DEFICIENCY: ICD-10-CM

## 2021-11-17 PROCEDURE — G8754 DIAS BP LESS 90: HCPCS | Performed by: INTERNAL MEDICINE

## 2021-11-17 PROCEDURE — 1101F PT FALLS ASSESS-DOCD LE1/YR: CPT | Performed by: INTERNAL MEDICINE

## 2021-11-17 PROCEDURE — G8417 CALC BMI ABV UP PARAM F/U: HCPCS | Performed by: INTERNAL MEDICINE

## 2021-11-17 PROCEDURE — 99214 OFFICE O/P EST MOD 30 MIN: CPT | Performed by: INTERNAL MEDICINE

## 2021-11-17 PROCEDURE — G8510 SCR DEP NEG, NO PLAN REQD: HCPCS | Performed by: INTERNAL MEDICINE

## 2021-11-17 PROCEDURE — G8427 DOCREV CUR MEDS BY ELIG CLIN: HCPCS | Performed by: INTERNAL MEDICINE

## 2021-11-17 PROCEDURE — G8752 SYS BP LESS 140: HCPCS | Performed by: INTERNAL MEDICINE

## 2021-11-17 PROCEDURE — G8536 NO DOC ELDER MAL SCRN: HCPCS | Performed by: INTERNAL MEDICINE

## 2021-11-17 RX ORDER — GLIMEPIRIDE 1 MG/1
1 TABLET ORAL
Qty: 90 TABLET | Refills: 3 | Status: SHIPPED | OUTPATIENT
Start: 2021-11-17 | End: 2022-02-15 | Stop reason: CLARIF

## 2021-11-17 NOTE — PROGRESS NOTES
Herbert Barrientos is a [de-identified] y.o. male (: 1940) presenting to address:    Chief Complaint   Patient presents with    Medication Evaluation     BP       Vitals:    21 1122   BP: 114/66   Pulse: (!) 57   Resp: 16   Temp: 97.4 °F (36.3 °C)   TempSrc: Temporal   SpO2: 96%   Weight: 205 lb 3.2 oz (93.1 kg)   Height: 5' 8\" (1.727 m)       Hearing/Vision:   No exam data present    Learning Assessment:     Learning Assessment 2017   PRIMARY LEARNER Patient   HIGHEST LEVEL OF EDUCATION - PRIMARY LEARNER  4 YEARS OF COLLEGE   BARRIERS PRIMARY LEARNER NONE   CO-LEARNER CAREGIVER No   PRIMARY LANGUAGE ENGLISH    NEED No   LEARNER PREFERENCE PRIMARY VIDEOS   ANSWERED BY self   RELATIONSHIP SELF     Depression Screening:     3 most recent PHQ Screens 2021   Little interest or pleasure in doing things Not at all   Feeling down, depressed, irritable, or hopeless Not at all   Total Score PHQ 2 0     Fall Risk Assessment:     Fall Risk Assessment, last 12 mths 2021   Able to walk? Yes   Fall in past 12 months? 0   Do you feel unsteady? 0   Are you worried about falling 0   Number of falls in past 12 months -   Fall with injury? -     Abuse Screening:     Abuse Screening Questionnaire 2021   Do you ever feel afraid of your partner? N   Are you in a relationship with someone who physically or mentally threatens you? N   Is it safe for you to go home?  Y     ADL Assessment:     ADL Assessment 2021   Feeding yourself No Help Needed   Getting from bed to chair No Help Needed   Getting dressed No Help Needed   Bathing or showering No Help Needed   Walk across the room (includes cane/walker) No Help Needed   Using the telphone No Help Needed   Taking your medications No Help Needed   Preparing meals No Help Needed   Managing money (expenses/bills) No Help Needed   Moderately strenuous housework (laundry) No Help Needed   Shopping for personal items (toiletries/medicines) No Help Needed   Shopping for groceries No Help Needed   Driving No Help Needed   Climbing a flight of stairs No Help Needed   Getting to places beyond walking distances No Help Needed        Coordination of Care Questionaire:   1. \"Have you been to the ER, urgent care clinic since your last visit? Hospitalized since your last visit? \" No    2. \"Have you seen or consulted any other health care providers outside of the [address] [place] [address] [facility] since your last visit? \" No     3. For patients aged 39-70: Has the patient had a colonoscopy? Yes, HM satisfied with blue hyperlink     If the patient is female:    4. For patients aged 41-77: Has the patient had a mammogram within the past 2 years? No    5. For patients aged 21-65: Has the patient had a pap smear? No    Advanced Directive:   1. Do you have an Advanced Directive? NO    2. Would you like information on Advanced Directives? NO    Patient brought BP machine from home.   BP Rt wrist 129/72  BP Lt wrist 126/72

## 2021-11-17 NOTE — PATIENT INSTRUCTIONS
Please decrease your Vit B12 dose, take 5000 units weekly  Please call us if glucose is less than 80.

## 2021-11-17 NOTE — PROGRESS NOTES
HISTORY OF PRESENT ILLNESS  Irma Tellez is a [de-identified] y.o. male. HPI  HTN, stable, his bp is much better since recent visit, he monitor his bp at home and it has been controlled well also. DM, with stage 3 CKD, controlled, recent A1c was 6.3, he decreased his insulin dose to 10 units, glucose has been  in am, and less than 170 after meals  Vit B12 def, recent level was elevated @ 1615, he has been taking 5000 mcg tablets, one tablet 3 times weekly  Recent labs discussed today. Review of Systems   Constitutional: Negative for fever. Respiratory: Negative for shortness of breath. Cardiovascular: Negative for chest pain, palpitations and leg swelling. Neurological: Negative for dizziness. Physical Exam  Vitals reviewed. Cardiovascular:      Rate and Rhythm: Normal rate and regular rhythm. Heart sounds: Normal heart sounds. No murmur heard. Pulmonary:      Effort: Pulmonary effort is normal.      Breath sounds: Normal breath sounds. Musculoskeletal:      Right lower leg: No edema. Left lower leg: No edema. Neurological:      Mental Status: He is oriented to person, place, and time. ASSESSMENT and PLAN  Diagnoses and all orders for this visit:    1. Essential hypertension, controlled, continue current meds    2. B12 deficiency, over corrected, will decrease vit B12 dose, he will take one tablet weekly since he has plenty of the 5000 mcg tablets    3. Type 2 diabetes mellitus with stage 3b chronic kidney disease, with long-term current use of insulin (HCC), stable  -     glimepiride (AMARYL) 1 mg tablet; Take 1 Tablet by mouth Daily (before breakfast). Continue diet/meds, continue to monitor glucose, he will call if less than 80.    4. Mixed hyperlipidemia, controlled, continue lipitor      Follow-up and Dispositions    · Return in about 3 months (around 2/17/2022).        Lab Results   Component Value Date/Time    Hemoglobin A1c 6.3 (H) 11/03/2021 09:06 AM    Hemoglobin A1c (POC) 9.0 03/25/2021 08:39 AM    Hemoglobin A1c, External 6.5 05/19/2021 12:00 AM     Lab Results   Component Value Date/Time    Cholesterol, total 125 11/03/2021 09:06 AM    HDL Cholesterol 38 (L) 11/03/2021 09:06 AM    LDL, calculated 71.8 11/03/2021 09:06 AM    VLDL, calculated 15.2 11/03/2021 09:06 AM    Triglyceride 76 11/03/2021 09:06 AM    CHOL/HDL Ratio 3.3 11/03/2021 09:06 AM     Lab Results   Component Value Date/Time    WBC 6.5 11/03/2021 09:06 AM    HGB 11.7 (L) 11/03/2021 09:06 AM    HCT 38.5 11/03/2021 09:06 AM    PLATELET 486 87/01/7159 09:06 AM    MCV 96.5 11/03/2021 09:06 AM

## 2022-01-01 RX ORDER — ATORVASTATIN CALCIUM 10 MG/1
TABLET, FILM COATED ORAL
Qty: 90 TABLET | Refills: 3 | Status: SHIPPED | OUTPATIENT
Start: 2022-01-01

## 2022-02-15 ENCOUNTER — OFFICE VISIT (OUTPATIENT)
Dept: FAMILY MEDICINE CLINIC | Age: 82
End: 2022-02-15
Payer: MEDICARE

## 2022-02-15 ENCOUNTER — APPOINTMENT (OUTPATIENT)
Dept: FAMILY MEDICINE CLINIC | Age: 82
End: 2022-02-15

## 2022-02-15 ENCOUNTER — HOSPITAL ENCOUNTER (OUTPATIENT)
Dept: LAB | Age: 82
Discharge: HOME | End: 2022-02-15
Payer: MEDICARE

## 2022-02-15 VITALS
HEART RATE: 52 BPM | SYSTOLIC BLOOD PRESSURE: 126 MMHG | OXYGEN SATURATION: 96 % | BODY MASS INDEX: 29.49 KG/M2 | HEIGHT: 68 IN | DIASTOLIC BLOOD PRESSURE: 77 MMHG | RESPIRATION RATE: 16 BRPM | WEIGHT: 194.6 LBS | TEMPERATURE: 97.9 F

## 2022-02-15 DIAGNOSIS — E78.2 MIXED HYPERLIPIDEMIA: ICD-10-CM

## 2022-02-15 DIAGNOSIS — N18.32 TYPE 2 DIABETES MELLITUS WITH STAGE 3B CHRONIC KIDNEY DISEASE, WITH LONG-TERM CURRENT USE OF INSULIN (HCC): Primary | ICD-10-CM

## 2022-02-15 DIAGNOSIS — Z79.4 TYPE 2 DIABETES MELLITUS WITH STAGE 3B CHRONIC KIDNEY DISEASE, WITH LONG-TERM CURRENT USE OF INSULIN (HCC): Primary | ICD-10-CM

## 2022-02-15 DIAGNOSIS — E53.8 B12 DEFICIENCY: ICD-10-CM

## 2022-02-15 DIAGNOSIS — L72.3 SEBACEOUS CYST: ICD-10-CM

## 2022-02-15 DIAGNOSIS — E11.22 TYPE 2 DIABETES MELLITUS WITH STAGE 3B CHRONIC KIDNEY DISEASE, WITH LONG-TERM CURRENT USE OF INSULIN (HCC): Primary | ICD-10-CM

## 2022-02-15 DIAGNOSIS — I10 ESSENTIAL HYPERTENSION: ICD-10-CM

## 2022-02-15 LAB
ALBUMIN SERPL-MCNC: 4 G/DL (ref 3.4–5)
ALBUMIN/GLOB SERPL: 1.4 {RATIO} (ref 0.8–1.7)
ALP SERPL-CCNC: 55 U/L (ref 45–117)
ALT SERPL-CCNC: 14 U/L (ref 16–61)
AST SERPL-CCNC: 9 U/L (ref 10–38)
BILIRUB DIRECT SERPL-MCNC: 0.2 MG/DL (ref 0–0.2)
BILIRUB SERPL-MCNC: 0.6 MG/DL (ref 0.2–1)
CHOLEST SERPL-MCNC: 136 MG/DL
GLOBULIN SER CALC-MCNC: 2.9 G/DL (ref 2–4)
HBA1C MFR BLD HPLC: 6.9 %
HDLC SERPL-MCNC: 46 MG/DL (ref 40–60)
HDLC SERPL: 3 {RATIO} (ref 0–5)
LDLC SERPL CALC-MCNC: 77 MG/DL (ref 0–100)
LIPID PROFILE,FLP: NORMAL
PROT SERPL-MCNC: 6.9 G/DL (ref 6.4–8.2)
TRIGL SERPL-MCNC: 65 MG/DL (ref ?–150)
VIT B12 SERPL-MCNC: 865 PG/ML (ref 211–911)
VLDLC SERPL CALC-MCNC: 13 MG/DL

## 2022-02-15 PROCEDURE — 1101F PT FALLS ASSESS-DOCD LE1/YR: CPT | Performed by: INTERNAL MEDICINE

## 2022-02-15 PROCEDURE — G8510 SCR DEP NEG, NO PLAN REQD: HCPCS | Performed by: INTERNAL MEDICINE

## 2022-02-15 PROCEDURE — 99214 OFFICE O/P EST MOD 30 MIN: CPT | Performed by: INTERNAL MEDICINE

## 2022-02-15 PROCEDURE — G8752 SYS BP LESS 140: HCPCS | Performed by: INTERNAL MEDICINE

## 2022-02-15 PROCEDURE — G8417 CALC BMI ABV UP PARAM F/U: HCPCS | Performed by: INTERNAL MEDICINE

## 2022-02-15 PROCEDURE — G8427 DOCREV CUR MEDS BY ELIG CLIN: HCPCS | Performed by: INTERNAL MEDICINE

## 2022-02-15 PROCEDURE — 82607 VITAMIN B-12: CPT

## 2022-02-15 PROCEDURE — G8536 NO DOC ELDER MAL SCRN: HCPCS | Performed by: INTERNAL MEDICINE

## 2022-02-15 PROCEDURE — G8754 DIAS BP LESS 90: HCPCS | Performed by: INTERNAL MEDICINE

## 2022-02-15 PROCEDURE — 83036 HEMOGLOBIN GLYCOSYLATED A1C: CPT | Performed by: INTERNAL MEDICINE

## 2022-02-15 PROCEDURE — 80076 HEPATIC FUNCTION PANEL: CPT

## 2022-02-15 PROCEDURE — 80061 LIPID PANEL: CPT

## 2022-02-15 PROCEDURE — 36415 COLL VENOUS BLD VENIPUNCTURE: CPT

## 2022-02-15 RX ORDER — GLIPIZIDE 2.5 MG/1
2.5 TABLET, EXTENDED RELEASE ORAL
Qty: 90 TABLET | Refills: 1 | Status: SHIPPED | OUTPATIENT
Start: 2022-02-15 | End: 2022-10-16

## 2022-02-15 NOTE — PROGRESS NOTES
HISTORY OF PRESENT ILLNESS  Dwain Zhao is a [de-identified] y.o. male. HPI  DM, controlled, glucose 90's-140 in am, A1c 6.9 today, he is not using any insulin now, he is on Januvia and Amaryl, but his insurance does not cover amaryl anymore, need to change to glipizide, no hypoglycemia  HTN, controlled well on Hydralazine/Bystolic/lasix  HLD, stable on lipitor daily  Vit b12 def, stable, he has been taking one tablet weekly  Followed by Nephrology for his CKD, had labs done 1-12-22, GFR was 40.9  Has Sebaceous cyst on his back, seen by Dermatology, told to see general surgery for excision. Review of Systems   Constitutional: Negative for fever. Respiratory: Negative for cough and shortness of breath. Cardiovascular: Negative for chest pain. Gastrointestinal: Negative for abdominal pain. Musculoskeletal: Negative for myalgias. Neurological: Negative for headaches. Physical Exam  Vitals reviewed. Cardiovascular:      Rate and Rhythm: Normal rate and regular rhythm. Heart sounds: Normal heart sounds. No murmur heard. Pulmonary:      Effort: Pulmonary effort is normal.      Breath sounds: Normal breath sounds. Musculoskeletal:      Right lower leg: No edema. Left lower leg: No edema. Skin:     Comments: 1.25\" Sebaceous cyst on the left upper back, no signs of infection, no erythema, no tenderness. Neurological:      Mental Status: He is oriented to person, place, and time. ASSESSMENT and PLAN  Diagnoses and all orders for this visit:    1. Type 2 diabetes mellitus with stage 3b chronic kidney disease, with long-term current use of insulin (HCC), controlled  -     AMB POC HEMOGLOBIN A1C  -     glipiZIDE SR (GLUCOTROL XL) 2.5 mg CR tablet; Take 1 Tablet by mouth every morning. 2. Essential hypertension, controlled, continue hydralazine/Bystolic/lasix    3. Mixed hyperlipidemia, continue Lipitor  -     LIPID PANEL; Future  -     HEPATIC FUNCTION PANEL; Future    4.  B12 deficiency, stable  -     VITAMIN B12; Future    5. Sebaceous cyst  -     REFERRAL TO GENERAL SURGERY      Follow-up and Dispositions    · Return in about 3 months (around 5/18/2022). Basic Metabolic Panel  Specimen:  Blood - Blood (substance)   Ref Range & Units 1 mo ago Comments   Potassium 3.5 - 5.5 mmol/L 4.3     Sodium 133 - 145 mmol/L 142     Chloride 98 - 110 mmol/L 104     Glucose 70 - 99 mg/dL 172 High      Calcium 8.4 - 10.5 mg/dL 8.9     BUN 6 - 22 mg/dL 23 High      Creatinine 0.8 - 1.6 mg/dL 1.6     CO2 20 - 32 mmol/L 25     eGFR  >60.0 49.6 Low      eGFR Non African American >60.0 40.9 Low      Anion Gap 3.0 - 15.0 mmol/L 13.0  Anion Gap calculation based on electrolyte reference ranges. [facility]  [address] [place] [address] LABORATORY      Narrative  Performed by [address] [place] [address] LABORATORY  Estimated GFR results are reported in mL/min/1.73 sq.m. by the MDRD equation. This eGFR is validated for stable chronic renal failure patients. This equation is unreliable in acute illness or patients with normal renal function.   Specimen Collected: 01/12/22 10:58 AM Last Resulted: 01/12/22  1:45 PM

## 2022-02-15 NOTE — PROGRESS NOTES
Alice Lowe is a [de-identified] y.o. male (: 1940) presenting to address:    Chief Complaint   Patient presents with    Medication Evaluation       Vitals:    02/15/22 0826   BP: 126/77   Pulse: (!) 52   Resp: 16   Temp: 97.9 °F (36.6 °C)   TempSrc: Temporal   SpO2: 96%   Weight: 194 lb 9.6 oz (88.3 kg)   Height: 5' 8\" (1.727 m)   PainSc:   0 - No pain       Hearing/Vision:   No exam data present    Learning Assessment:     Learning Assessment 2017   PRIMARY LEARNER Patient   HIGHEST LEVEL OF EDUCATION - PRIMARY LEARNER  4 YEARS OF COLLEGE   BARRIERS PRIMARY LEARNER NONE   CO-LEARNER CAREGIVER No   PRIMARY LANGUAGE ENGLISH    NEED No   LEARNER PREFERENCE PRIMARY VIDEOS   ANSWERED BY self   RELATIONSHIP SELF     Depression Screening:     3 most recent PHQ Screens 2/15/2022   Little interest or pleasure in doing things Not at all   Feeling down, depressed, irritable, or hopeless Not at all   Total Score PHQ 2 0     Fall Risk Assessment:     Fall Risk Assessment, last 12 mths 2/15/2022   Able to walk? Yes   Fall in past 12 months? 0   Do you feel unsteady? 0   Are you worried about falling 0   Number of falls in past 12 months -   Fall with injury? -     Abuse Screening:     Abuse Screening Questionnaire 2/15/2022   Do you ever feel afraid of your partner? N   Are you in a relationship with someone who physically or mentally threatens you? N   Is it safe for you to go home?  Y     ADL Assessment:     ADL Assessment 2021   Feeding yourself No Help Needed   Getting from bed to chair No Help Needed   Getting dressed No Help Needed   Bathing or showering No Help Needed   Walk across the room (includes cane/walker) No Help Needed   Using the telphone No Help Needed   Taking your medications No Help Needed   Preparing meals No Help Needed   Managing money (expenses/bills) No Help Needed   Moderately strenuous housework (laundry) No Help Needed   Shopping for personal items (toiletries/medicines) No Help Needed   Shopping for groceries No Help Needed   Driving No Help Needed   Climbing a flight of stairs No Help Needed   Getting to places beyond walking distances No Help Needed        Coordination of Care Questionaire:   1. \"Have you been to the ER, urgent care clinic since your last visit? Hospitalized since your last visit? \" No    2. \"Have you seen or consulted any other health care providers outside of the [address] [place] [address] [facility] since your last visit? \" Yes Where: [facility]     3. For patients aged 39-70: Has the patient had a colonoscopy? NA - based on age     Advanced Directive:   1. Do you have an Advanced Directive? YES    2. Would you like information on Advanced Directives?  NO

## 2022-02-17 ENCOUNTER — COMPREHENSIVE EXAM (OUTPATIENT)
Dept: URBAN - METROPOLITAN AREA CLINIC 1 | Facility: CLINIC | Age: 82
End: 2022-02-17

## 2022-02-17 DIAGNOSIS — H16.143: ICD-10-CM

## 2022-02-17 DIAGNOSIS — H40.013: ICD-10-CM

## 2022-02-17 DIAGNOSIS — H01.021: ICD-10-CM

## 2022-02-17 DIAGNOSIS — Z79.4: ICD-10-CM

## 2022-02-17 DIAGNOSIS — H01.024: ICD-10-CM

## 2022-02-17 DIAGNOSIS — E11.9: ICD-10-CM

## 2022-02-17 DIAGNOSIS — H04.123: ICD-10-CM

## 2022-02-17 DIAGNOSIS — H26.493: ICD-10-CM

## 2022-02-17 PROCEDURE — 92014 COMPRE OPH EXAM EST PT 1/>: CPT

## 2022-02-17 PROCEDURE — 92015 DETERMINE REFRACTIVE STATE: CPT

## 2022-02-17 ASSESSMENT — VISUAL ACUITY
OD_SC: 20/60
OD_BAT: 20/60
OS_SC: 20/80
OS_CC: 20/25
OS_BAT: 20/60
OD_CC: 20/30

## 2022-02-17 ASSESSMENT — TONOMETRY
OS_IOP_MMHG: 13
OD_IOP_MMHG: 13

## 2022-02-17 ASSESSMENT — KERATOMETRY
OD_AXISANGLE_DEGREES: 087
OS_K1POWER_DIOPTERS: 43.25
OD_AXISANGLE2_DEGREES: 177
OD_K1POWER_DIOPTERS: 43.00
OS_AXISANGLE_DEGREES: 107
OS_K2POWER_DIOPTERS: 43.75
OD_K2POWER_DIOPTERS: 44.00
OS_AXISANGLE2_DEGREES: 017

## 2022-02-17 NOTE — PATIENT DISCUSSION
(CD 0.75,0.6) Past OCT WNL OU. Patient is considered low risk. Condition was discussed with patient and patient understands. Will continue to monitor patient for any progression in condition. Patient was advised to call us with any problems, questions, or concerns.

## 2022-02-17 NOTE — PATIENT DISCUSSION
Visually Significant secondary to glare; schedule YAG Cap OD then OS. Pros, cons, risks and benefits discussed with patient. Patient wishes to proceed.

## 2022-02-26 ENCOUNTER — HOSPITAL ENCOUNTER (EMERGENCY)
Age: 82
Discharge: HOME | End: 2022-02-26
Attending: EMERGENCY MEDICINE
Payer: MEDICARE

## 2022-02-26 ENCOUNTER — TELEPHONE (OUTPATIENT)
Dept: INTERNAL MEDICINE CLINIC | Age: 82
End: 2022-02-26

## 2022-02-26 VITALS
WEIGHT: 184 LBS | SYSTOLIC BLOOD PRESSURE: 134 MMHG | RESPIRATION RATE: 16 BRPM | TEMPERATURE: 97.8 F | HEART RATE: 60 BPM | OXYGEN SATURATION: 94 % | HEIGHT: 68 IN | DIASTOLIC BLOOD PRESSURE: 56 MMHG | BODY MASS INDEX: 27.89 KG/M2

## 2022-02-26 DIAGNOSIS — U07.1 COVID-19: Primary | ICD-10-CM

## 2022-02-26 PROCEDURE — M0247 HC SOTROVIMAB INFUSION: HCPCS

## 2022-02-26 PROCEDURE — 99284 EMERGENCY DEPT VISIT MOD MDM: CPT

## 2022-02-26 PROCEDURE — 74011250636 HC RX REV CODE- 250/636: Performed by: EMERGENCY MEDICINE

## 2022-02-26 PROCEDURE — 74011000258 HC RX REV CODE- 258: Performed by: EMERGENCY MEDICINE

## 2022-02-26 RX ADMIN — SODIUM CHLORIDE 500 MG: 900 INJECTION INTRAVENOUS at 15:57

## 2022-02-26 NOTE — TELEPHONE ENCOUNTER
Received call from patient and his wife. Reports that he began feeling ill yesterday with worsening this morning, and tested positive for COVID 19 by home test. Reports nasal congestion, chest congestion, T100 F, headache, and fatigue. SpO2 94% on room air. Wife reports baseline SpO2 98%. Reviewed chart. [de-identified]year old with diabetes and chronic chronic kidney disease. Reports uses albuterol +/-albuterol nebulizer as needed but not diagnosed with asthma and non-smoker. Completed Pfizer COVID-19 vaccine series and booster dose. Called and discussed with Dr. Carmen Jacobson at [address] [facility] ED. Monoclonal antibody available and patient meets criteria. Called wife and patient back and advised to proceed to [facility] ED for monoclonal antibody infusion. Answered all questions.

## 2022-02-26 NOTE — ED TRIAGE NOTES
Patient tested positive for covid 19 today states first symptom started this past Thursday. Patient PCP advised him to come here for the monoclonial infusion. Patient is vaccinated and boosted.

## 2022-02-26 NOTE — ED NOTES
Infusion completed,NAD noted or indicated. Pt states \" I have less of an headache. \" Primary RN and MD aware.

## 2022-02-26 NOTE — ED PROVIDER NOTES
EMERGENCY DEPARTMENT HISTORY AND PHYSICAL EXAM    2:50 PM  Date: 2/26/2022  Patient Name: Kitty Dia    History of Presenting Illness       History Provided By: patient and PCP Dr. Shannan Carter    HPI: Kitty Dia is a [de-identified] y.o. male with history as below presents with positive Covid test.  Patient is coughing and sneezing, experiencing body aches. Symptoms started yesterday. No shortness of breath. PCP (Dr. Shannan Carter)  advised to send to the ED for antibody treatment. Patient denies any antibiotic treatment  Patient is fully vaccinated with booster        PCP: Judy Duran MD    Past History     Past Medical History:  Past Medical History:   Diagnosis Date    BPH with obstruction/lower urinary tract symptoms     Calculus of kidney     Chronic kidney disease     Diabetes ([address]     Elevated PSA     Hypercholesterolemia     Hypertension        Past Surgical History:  Past Surgical History:   Procedure Laterality Date    HX APPENDECTOMY      16years old     HX COLONOSCOPY  09/10/2014    HX ORTHOPAEDIC  1970s    r shoulder    HX TONSILLECTOMY      HX UROLOGICAL  02/06/2017    Prostate Bx: HGPIN left lateral mid. Dr. Maricruz Prabhakar @ [facility] in [facility]. Family History:  Family History   Problem Relation Age of Onset    Kidney Disease Mother     Stroke Father        Social History:  Social History     Tobacco Use    Smoking status: Never Smoker    Smokeless tobacco: Never Used   Vaping Use    Vaping Use: Never used   Substance Use Topics    Alcohol use: No    Drug use: No       Allergies: Allergies   Allergen Reactions    Norvasc [Amlodipine] Swelling     Patient states NKDA       Review of Systems   Review of Systems   Constitutional: Positive for fatigue. Negative for activity change, appetite change and chills. HENT: Positive for congestion. Negative for ear discharge, ear pain and sore throat. Eyes: Negative for photophobia and pain. Respiratory: Positive for cough.  Negative for choking and shortness of breath. Cardiovascular: Negative for palpitations and leg swelling. Gastrointestinal: Negative for anal bleeding and rectal pain. Endocrine: Negative for polydipsia and polyuria. Genitourinary: Negative for genital sores and urgency. Musculoskeletal: Positive for myalgias. Negative for arthralgias. Neurological: Negative for dizziness, seizures and speech difficulty. Psychiatric/Behavioral: Negative for hallucinations, self-injury and suicidal ideas. Physical Exam     Patient Vitals for the past 12 hrs:   Temp Pulse Resp BP SpO2   02/26/22 1446     96 %   02/26/22 1430 97.8 °F (36.6 °C) 60 16 111/65 96 %       Physical Exam  Vitals and nursing note reviewed. Constitutional:       Appearance: He is well-developed. HENT:      Head: Normocephalic and atraumatic. Eyes:      General:         Right eye: No discharge. Left eye: No discharge. Cardiovascular:      Rate and Rhythm: Normal rate and regular rhythm. Heart sounds: Normal heart sounds. No murmur heard. Pulmonary:      Effort: Pulmonary effort is normal. No respiratory distress. Breath sounds: Normal breath sounds. No stridor. No wheezing or rales. Chest:      Chest wall: No tenderness. Abdominal:      General: Bowel sounds are normal. There is no distension. Palpations: Abdomen is soft. Tenderness: There is no abdominal tenderness. There is no guarding or rebound. Musculoskeletal:         General: Normal range of motion. Cervical back: Normal range of motion and neck supple. Skin:     General: Skin is warm and dry. Neurological:      Mental Status: He is alert and oriented to person, place, and time. Diagnostic Study Results     Labs -  No results found for this or any previous visit (from the past 12 hour(s)). Radiologic Studies -   No results found.       Medical Decision Making     ED Course: Progress Notes, Reevaluation, and Consults:    2:50 PM Initial assessment performed. The patients presenting problems have been discussed, and they/their family are in agreement with the care plan formulated and outlined with them. I have encouraged them to ask questions as they arise throughout their visit. Provider Notes (Medical Decision Making):   Patient presents with cold-like symptoms that started yesterday  Covid positive  Patient will receive sotrovimab  Patient feels well on reassessment  Not hypoxic or tachypneic  Will be discharged with PMD follow-up within 24 hours  Advised to return shortness of breath chest pain or any other concerns            Vital Signs-Reviewed the patient's vital signs. Reviewed pt's pulse ox reading. Records Reviewed: old medical records  -I am the first provider for this patient.  -I reviewed the vital signs, available nursing notes, past medical history, past surgical history, family history and social history. Current Outpatient Medications   Medication Sig Dispense Refill    glipiZIDE SR (GLUCOTROL XL) 2.5 mg CR tablet Take 1 Tablet by mouth every morning. 90 Tablet 1    atorvastatin (LIPITOR) 10 mg tablet TAKE 1 TABLET BY MOUTH  DAILY 90 Tablet 3    insulin glargine (Lantus Solostar U-100 Insulin) 100 unit/mL (3 mL) inpn INJECT SUBCUTANEOUSLY 20  UNITS DAILY (Patient not taking: Reported on 2/15/2022) 30 mL 3    nebivoloL (Bystolic) 5 mg tablet TAKE 1 TABLET BY MOUTH  DAILY 90 Tablet 3    hydrALAZINE (APRESOLINE) 100 mg tablet Take 1 Tablet by mouth three (3) times daily. 270 Tablet 1    SITagliptin (Januvia) 50 mg tablet TAKE 1 TABLET BY MOUTH  DAILY 90 Tablet 3    tamsulosin (FLOMAX) 0.4 mg capsule Take 1 Capsule by mouth daily.  90 Capsule 1    Insulin Needles, Disposable, 31 gauge x 5/16\" ndle USE DAILY WITH LANTUS (Patient not taking: Reported on 2/15/2022) 100 Pen Needle 3    furosemide (LASIX) 40 mg tablet TAKE 1 TABLET BY MOUTH  DAILY 90 Tab 3    fluticasone propionate (FLONASE) 50 mcg/actuation nasal spray USE 2 SPRAYS IN EACH  NOSTRIL DAILY 3 Bottle 3    glucose blood VI test strips (OneTouch Ultra Test) strip Test blood glucose bid. E11.22 300 Strip 3    triamcinolone acetonide (KENALOG) 0.1 % topical cream Apply  to affected area two (2) times daily as needed for Skin Irritation or Itching. 454 g 3    acetaminophen (Tylenol Extra Strength) 500 mg tablet Take  by mouth every six (6) hours as needed for Pain. (Patient not taking: Reported on 2/15/2022)      olopatadine (PATADAY) 0.2 % drop ophthalmic solution Administer 1 Drop to both eyes daily. 7.5 mL 0    albuterol (PROVENTIL VENTOLIN) 2.5 mg /3 mL (0.083 %) nebulizer solution 3 mL by Nebulization route every four (4) hours as needed for Wheezing. 24 Each 1    albuterol (PROVENTIL HFA, VENTOLIN HFA, PROAIR HFA) 90 mcg/actuation inhaler Take 2 Puffs by inhalation every four (4) hours as needed for Wheezing. 1 Inhaler 11    cyanocobalamin 1,000 mcg tablet Take 5,000 mcg by mouth. One tablet on Wednesday.  cholecalciferol (VITAMIN D3) 1,000 unit cap Take  by mouth daily.  vitamin E (AQUA GEMS) 400 unit capsule Take  by mouth daily.  aspirin 81 mg chewable tablet Take 81 mg by mouth daily.  cranberry extract 450 mg tab Take  by mouth.  ascorbic acid, vitamin C, (VITAMIN C) 500 mg tablet Take 1,000 mg by mouth.  OTHER Indications: Osteo Bi-Flex 1 per day          Clinical Impression     Clinical Impression: No diagnosis found. Disposition: This note was dictated utilizing voice recognition software which may lead to typographical errors. I apologize in advance if the situation occurs. If questions arise please do not hesitate to contact me or call our department.     Alexandra Kaiser MD  2:50 PM

## 2022-02-28 ENCOUNTER — PATIENT OUTREACH (OUTPATIENT)
Dept: CASE MANAGEMENT | Age: 82
End: 2022-02-28

## 2022-02-28 NOTE — PROGRESS NOTES
Ambulatory Care Coordination ED COVID Follow up Call    Challenges to be reviewed by the provider   Additional needs identified to be addressed with provider no  none           Encounter was not routed to provider for escalation. Method of communication with provider : none    Discussed COVID-19 related testing which was not done at this time. Test results were not done. Patient informed of results, if available? n/a. Current Symptoms: no new symptoms and no worsening symptoms. Spoke with patient's wife. She states he is doing great. Reviewed New or Changed Meds: none    Do you have what you need at home?  Durable Medical Equipment ordered at discharge: None   Home Health/Outpatient orders at discharge: none    Pulse oximeter? no Discussed and confirmed pulse oximeter discharge instructions and when to notify provider or seek emergency care. Patient education provided: Reviewed appropriate site of care based on symptoms and resources available to patient including: PCP. Follow up appointment recommended: no. If no appointment scheduled, scheduling offered: no. Follow up with pcp as needed  Future Appointments   Date Time Provider Chrissy Dicki   5/18/2022  8:00 AM Mick Faulkner MD [facility] BS AMB       Interventions: Obtained and reviewed discharge summary and/or continuity of care documents  Reviewed discharge instructions, medical action plan and red flags with family who verbalized understanding. Provided contact information for future needs. Plan for follow-up call in 5-7 days based on severity of symptoms and risk factors.   Plan for next call: follow up close    Anabela Gil LPN

## 2022-03-07 ENCOUNTER — PATIENT OUTREACH (OUTPATIENT)
Dept: CASE MANAGEMENT | Age: 82
End: 2022-03-07

## 2022-03-07 NOTE — PROGRESS NOTES
Follow Up Call    Challenges to be reviewed by the provider   Additional needs identified to be addressed with provider: no  none           Encounter was not routed to provider for escalation. Method of communication with provider: none. Contacted the patient by telephone to follow up after ED. Status: improved  Interventions to address identified needs: none    [facility] follow up appointment(s):   Future Appointments   Date Time Provider Chrissy Dikci   5/18/2022  8:00 AM MD IVY PetersMA BS [facility]     Non-[facility] follow up appointment(s): n/a   Follow up appointment completed? n/a. Provided contact information for future needs. No further follow-up call indicated based on severity of symptoms and risk factors.   Plan for next call: Alexandra Anderson LPN

## 2022-03-09 RX ORDER — FUROSEMIDE 40 MG/1
TABLET ORAL
Qty: 90 TABLET | Refills: 3 | Status: SHIPPED | OUTPATIENT
Start: 2022-03-09

## 2022-03-18 PROBLEM — E11.22 TYPE 2 DIABETES MELLITUS WITH STAGE 3B CHRONIC KIDNEY DISEASE, WITH LONG-TERM CURRENT USE OF INSULIN (HCC): Status: ACTIVE | Noted: 2017-12-21

## 2022-03-18 PROBLEM — E66.811 CLASS 1 OBESITY DUE TO EXCESS CALORIES WITH SERIOUS COMORBIDITY AND BODY MASS INDEX (BMI) OF 32.0 TO 32.9 IN ADULT: Status: ACTIVE | Noted: 2019-05-07

## 2022-03-18 PROBLEM — N18.32 TYPE 2 DIABETES MELLITUS WITH STAGE 3B CHRONIC KIDNEY DISEASE, WITH LONG-TERM CURRENT USE OF INSULIN (HCC): Status: ACTIVE | Noted: 2017-12-21

## 2022-03-18 PROBLEM — N25.89 RTA (RENAL TUBULAR ACIDOSIS): Status: ACTIVE | Noted: 2018-08-28

## 2022-03-18 PROBLEM — Z79.4 TYPE 2 DIABETES MELLITUS WITH STAGE 3B CHRONIC KIDNEY DISEASE, WITH LONG-TERM CURRENT USE OF INSULIN (HCC): Status: ACTIVE | Noted: 2017-12-21

## 2022-03-18 PROBLEM — E66.09 CLASS 1 OBESITY DUE TO EXCESS CALORIES WITH SERIOUS COMORBIDITY AND BODY MASS INDEX (BMI) OF 32.0 TO 32.9 IN ADULT: Status: ACTIVE | Noted: 2019-05-07

## 2022-03-18 PROBLEM — K80.21 CALCULUS OF GALLBLADDER WITH BILIARY OBSTRUCTION BUT WITHOUT CHOLECYSTITIS: Status: ACTIVE | Noted: 2017-12-21

## 2022-03-19 PROBLEM — R60.0 LOCALIZED EDEMA: Status: ACTIVE | Noted: 2018-05-21

## 2022-03-19 PROBLEM — N40.1 BENIGN PROSTATIC HYPERPLASIA WITH LOWER URINARY TRACT SYMPTOMS: Status: ACTIVE | Noted: 2017-05-22

## 2022-03-19 PROBLEM — E53.8 B12 DEFICIENCY: Status: ACTIVE | Noted: 2017-06-01

## 2022-03-19 PROBLEM — D12.4 ADENOMATOUS POLYP OF DESCENDING COLON: Status: ACTIVE | Noted: 2018-05-21

## 2022-03-20 PROBLEM — I10 ESSENTIAL HYPERTENSION: Status: ACTIVE | Noted: 2017-05-22

## 2022-03-20 PROBLEM — N18.30 CKD (CHRONIC KIDNEY DISEASE) STAGE 3, GFR 30-59 ML/MIN (HCC): Status: ACTIVE | Noted: 2017-05-22

## 2022-03-20 PROBLEM — R97.20 ELEVATED PSA: Status: ACTIVE | Noted: 2017-05-22

## 2022-03-23 RX ORDER — TAMSULOSIN HYDROCHLORIDE 0.4 MG/1
0.4 CAPSULE ORAL DAILY
Qty: 90 CAPSULE | Refills: 3 | Status: SHIPPED | OUTPATIENT
Start: 2022-03-23

## 2022-04-01 ENCOUNTER — CLINIC PROCEDURE ONLY (OUTPATIENT)
Dept: URBAN - METROPOLITAN AREA CLINIC 1 | Facility: CLINIC | Age: 82
End: 2022-04-01

## 2022-04-01 DIAGNOSIS — H26.491: ICD-10-CM

## 2022-04-01 PROCEDURE — 66821 AFTER CATARACT LASER SURGERY: CPT

## 2022-04-01 ASSESSMENT — KERATOMETRY
OS_AXISANGLE2_DEGREES: 017
OS_K2POWER_DIOPTERS: 43.75
OD_K2POWER_DIOPTERS: 44.00
OD_AXISANGLE_DEGREES: 087
OD_AXISANGLE2_DEGREES: 177
OS_K1POWER_DIOPTERS: 43.25
OD_K1POWER_DIOPTERS: 43.00
OS_AXISANGLE_DEGREES: 107

## 2022-04-01 NOTE — PROCEDURE NOTE: CLINICAL
PROCEDURE NOTE: YAG Capsulotomy OD. Diagnosis: Other Secondary Cataract. Anesthesia: Topical. The purpose and nature of the procedure, possible alternative methods of treatment, the risks involved and the possibility of complications were discussed with patient. The Patient wishes to proceed and the consent was signed. 1 gtt Prolensa applied. The laser was then performed under topical anesthesia with no complications. Post op instructions were given to patient as well as a follow-up appointment. Patient was advised to call our office if any questions or concerns. Fiorella Moreno

## 2022-04-02 ASSESSMENT — VISUAL ACUITY
OS_SC: J2
OD_SC: 20/20
OS_SC: 20/20
OS_CC: 20/40
OS_SC: 20/20-2
OD_CC: 20/70
OD_GLARE: 20/80
OS_GLARE: 20/60
OD_SC: 20/20
OS_GLARE: 20/60
OS_CC: 20/50
OD_GLARE: 20/60
OD_SC: 20/20-2
OS_CC: 20/30
OS_CC: 20/50-1
OS_SC: 20/20
OD_CC: 20/30-2
OD_CC: 20/30
OS_SC: 20/20
OD_GLARE: 20/50
OS_CC: 20/40-2
OS_PH: SC 20/25
OD_SC: 20/20
OS_SC: 20/20-1
OS_SC: 20/20
OS_SC: J1
OD_CC: 20/30
OS_GLARE: 20/60
OD_CC: J1+
OD_CC: 20/30-2
OD_SC: 20/25-2
OS_SC: 20/20-2
OD_SC: 20/20-2
OS_CC: 20/50
OD_SC: 20/20
OS_CC: 20/40-2
OS_GLARE: 20/60
OD_SC: J5
OS_PH: SC 20/20
OS_SC: 20/20
OD_CC: J1
OD_PH: SC 20/20
OS_CC: J1+
OD_SC: 20/20
OD_GLARE: 20/80
OS_CC: J1

## 2022-04-02 ASSESSMENT — TONOMETRY
OS_IOP_MMHG: 16
OS_IOP_MMHG: 14
OS_IOP_MMHG: 15
OD_IOP_MMHG: 18
OS_IOP_MMHG: 18
OS_IOP_MMHG: 17
OD_IOP_MMHG: 19
OS_IOP_MMHG: 15
OD_IOP_MMHG: 15
OS_IOP_MMHG: 15
OD_IOP_MMHG: 17
OD_IOP_MMHG: 20
OD_IOP_MMHG: 14
OD_IOP_MMHG: 15
OD_IOP_MMHG: 15
OS_IOP_MMHG: 15
OS_IOP_MMHG: 17
OD_IOP_MMHG: 15
OS_IOP_MMHG: 20
OS_IOP_MMHG: 15

## 2022-04-27 ENCOUNTER — TELEPHONE (OUTPATIENT)
Dept: FAMILY MEDICINE CLINIC | Age: 82
End: 2022-04-27

## 2022-04-27 NOTE — TELEPHONE ENCOUNTER
----- Message from Bony Mihir sent at 4/27/2022  2:13 PM EDT -----  Subject: Message to Provider    QUESTIONS  Information for Provider? pt is having pain in left hip along with   headache after his 4/21/2022 oral surgery, he would like contacted to see   if he should come in to be seen   ---------------------------------------------------------------------------  --------------  [address]  What is the best way for the office to contact you? OK to leave message on   voicemail  Preferred Call Back Phone Number?  [phone]  ---------------------------------------------------------------------------  --------------  SCRIPT ANSWERS  undefined

## 2022-04-27 NOTE — TELEPHONE ENCOUNTER
I scheduled patient a appointment.   Future Appointments   Date Time Provider Chrissy Siabelle   4/28/2022  9:15 AM Ramon Villeda MD [facility] AMB   5/18/2022  8:00 AM Ramon Villeda MD [facility] AMB

## 2022-04-28 ENCOUNTER — OFFICE VISIT (OUTPATIENT)
Dept: FAMILY MEDICINE CLINIC | Age: 82
End: 2022-04-28
Payer: MEDICARE

## 2022-04-28 VITALS
TEMPERATURE: 97.8 F | RESPIRATION RATE: 18 BRPM | BODY MASS INDEX: 30.92 KG/M2 | HEART RATE: 59 BPM | OXYGEN SATURATION: 97 % | HEIGHT: 68 IN | SYSTOLIC BLOOD PRESSURE: 136 MMHG | WEIGHT: 204 LBS | DIASTOLIC BLOOD PRESSURE: 70 MMHG

## 2022-04-28 DIAGNOSIS — G57.02 PIRIFORMIS SYNDROME OF LEFT SIDE: Primary | ICD-10-CM

## 2022-04-28 DIAGNOSIS — E11.22 TYPE 2 DIABETES MELLITUS WITH STAGE 3B CHRONIC KIDNEY DISEASE, WITH LONG-TERM CURRENT USE OF INSULIN (HCC): ICD-10-CM

## 2022-04-28 DIAGNOSIS — N18.32 TYPE 2 DIABETES MELLITUS WITH STAGE 3B CHRONIC KIDNEY DISEASE, WITH LONG-TERM CURRENT USE OF INSULIN (HCC): ICD-10-CM

## 2022-04-28 DIAGNOSIS — I10 ESSENTIAL HYPERTENSION: ICD-10-CM

## 2022-04-28 DIAGNOSIS — Z79.4 TYPE 2 DIABETES MELLITUS WITH STAGE 3B CHRONIC KIDNEY DISEASE, WITH LONG-TERM CURRENT USE OF INSULIN (HCC): ICD-10-CM

## 2022-04-28 LAB — GLUCOSE POC: 135 MG/DL

## 2022-04-28 PROCEDURE — G8417 CALC BMI ABV UP PARAM F/U: HCPCS | Performed by: INTERNAL MEDICINE

## 2022-04-28 PROCEDURE — 1101F PT FALLS ASSESS-DOCD LE1/YR: CPT | Performed by: INTERNAL MEDICINE

## 2022-04-28 PROCEDURE — 3044F HG A1C LEVEL LT 7.0%: CPT | Performed by: INTERNAL MEDICINE

## 2022-04-28 PROCEDURE — 99214 OFFICE O/P EST MOD 30 MIN: CPT | Performed by: INTERNAL MEDICINE

## 2022-04-28 PROCEDURE — G8754 DIAS BP LESS 90: HCPCS | Performed by: INTERNAL MEDICINE

## 2022-04-28 PROCEDURE — G8432 DEP SCR NOT DOC, RNG: HCPCS | Performed by: INTERNAL MEDICINE

## 2022-04-28 PROCEDURE — G8536 NO DOC ELDER MAL SCRN: HCPCS | Performed by: INTERNAL MEDICINE

## 2022-04-28 PROCEDURE — 96372 THER/PROPH/DIAG INJ SC/IM: CPT | Performed by: INTERNAL MEDICINE

## 2022-04-28 PROCEDURE — G8752 SYS BP LESS 140: HCPCS | Performed by: INTERNAL MEDICINE

## 2022-04-28 PROCEDURE — G8427 DOCREV CUR MEDS BY ELIG CLIN: HCPCS | Performed by: INTERNAL MEDICINE

## 2022-04-28 PROCEDURE — 82962 GLUCOSE BLOOD TEST: CPT | Performed by: INTERNAL MEDICINE

## 2022-04-28 RX ORDER — KETOROLAC TROMETHAMINE 30 MG/ML
30 INJECTION, SOLUTION INTRAMUSCULAR; INTRAVENOUS ONCE
Qty: 1 ML | Refills: 0
Start: 2022-04-28 | End: 2022-04-28

## 2022-04-28 RX ORDER — HYDRALAZINE HYDROCHLORIDE 100 MG/1
100 TABLET, FILM COATED ORAL 3 TIMES DAILY
Qty: 270 TABLET | Refills: 3 | Status: SHIPPED | OUTPATIENT
Start: 2022-04-28

## 2022-04-28 RX ORDER — OXYCODONE AND ACETAMINOPHEN 5; 325 MG/1; MG/1
1 TABLET ORAL
COMMUNITY
End: 2022-05-18

## 2022-04-28 RX ORDER — AMOXICILLIN 500 MG/1
500 CAPSULE ORAL
COMMUNITY
End: 2022-04-28 | Stop reason: ALTCHOICE

## 2022-04-28 RX ORDER — METHOCARBAMOL 750 MG/1
750 TABLET, FILM COATED ORAL
Qty: 50 TABLET | Refills: 0 | Status: SHIPPED | OUTPATIENT
Start: 2022-04-28

## 2022-04-28 NOTE — PROGRESS NOTES
Kristen Mark is a [de-identified] y.o. male (: 1940) presenting to address:    Chief Complaint   Patient presents with    Hip Pain     Left hip       Vitals:    22 0918   BP: (!) 169/79   Pulse: (!) 59   Resp: 18   Temp: 97.8 °F (36.6 °C)   TempSrc: Temporal   SpO2: 97%   Weight: 204 lb (92.5 kg)   Height: 5' 8\" (1.727 m)       Hearing/Vision:   No exam data present    Learning Assessment:     Learning Assessment 2017   PRIMARY LEARNER Patient   HIGHEST LEVEL OF EDUCATION - PRIMARY LEARNER  4 YEARS OF COLLEGE   BARRIERS PRIMARY LEARNER NONE   CO-LEARNER CAREGIVER No   PRIMARY LANGUAGE ENGLISH    NEED No   LEARNER PREFERENCE PRIMARY VIDEOS   ANSWERED BY self   RELATIONSHIP SELF     Depression Screening:     3 most recent PHQ Screens 2/15/2022   Little interest or pleasure in doing things Not at all   Feeling down, depressed, irritable, or hopeless Not at all   Total Score PHQ 2 0     Fall Risk Assessment:     Fall Risk Assessment, last 12 mths 2022   Able to walk? Yes   Fall in past 12 months? 0   Do you feel unsteady? -   Are you worried about falling -   Number of falls in past 12 months -   Fall with injury? -     Abuse Screening:     Abuse Screening Questionnaire 2/15/2022   Do you ever feel afraid of your partner? N   Are you in a relationship with someone who physically or mentally threatens you? N   Is it safe for you to go home?  Y     ADL Assessment:     ADL Assessment 2021   Feeding yourself No Help Needed   Getting from bed to chair No Help Needed   Getting dressed No Help Needed   Bathing or showering No Help Needed   Walk across the room (includes cane/walker) No Help Needed   Using the telphone No Help Needed   Taking your medications No Help Needed   Preparing meals No Help Needed   Managing money (expenses/bills) No Help Needed   Moderately strenuous housework (laundry) No Help Needed   Shopping for personal items (toiletries/medicines) No Help Needed   Shopping for groceries No Help Needed   Driving No Help Needed   Climbing a flight of stairs No Help Needed   Getting to places beyond walking distances No Help Needed        Coordination of Care Questionaire:   1. \"Have you been to the ER, urgent care clinic since your last visit? Hospitalized since your last visit? \" No    2. \"Have you seen or consulted any other health care providers outside of the [address] [place] [address] [facility] since your last visit? \" Yes Where: [facility] surgeon     3. For patients aged 39-70: Has the patient had a colonoscopy? NA - based on age     If the patient is female:    4. For patients aged 41-77: Has the patient had a mammogram within the past 2 years? NA - based on age    11. For patients aged 21-65: Has the patient had a pap smear? NA - based on age    Advanced Directive:   1. Do you have an Advanced Directive? YES    2. Would you like information on Advanced Directives?  NO

## 2022-04-28 NOTE — PROGRESS NOTES
HISTORY OF PRESENT ILLNESS  Marc Wynn is a [de-identified] y.o. male. HPI  C/o left hip/buttock pain, started 5 days ago, constant, radiates down the back of his thigh, 8-9/10, no leg numbness, no dysuria  HTN, stable, bp is controlled, need refill on hydralazine  DM, with CKD, stable, his glucose is 135 today, he is not taking any Lantus, he is on [place] and [place] and glipizide. Review of Systems   Constitutional: Negative for fever. Respiratory: Negative for shortness of breath. Cardiovascular: Negative for chest pain. Gastrointestinal: Negative for abdominal pain. Genitourinary: Negative for dysuria and hematuria. Musculoskeletal: Negative for falls. Neurological: Negative for headaches. Physical Exam  Vitals reviewed. Cardiovascular:      Rate and Rhythm: Normal rate and regular rhythm. Heart sounds: No murmur heard. Pulmonary:      Effort: Pulmonary effort is normal.      Breath sounds: Normal breath sounds. Abdominal:      Palpations: Abdomen is soft. Tenderness: There is no abdominal tenderness. Musculoskeletal:      Lumbar back: No lacerations or tenderness. Normal range of motion. Left hip: Tenderness (posterior, mid gluteal area.) present. Normal range of motion. Legs:          ASSESSMENT and PLAN  Diagnoses and all orders for this visit:    1. Piriformis syndrome of left side  -     methocarbamoL (ROBAXIN) 750 mg tablet; Take 1 Tablet by mouth four (4) times daily as needed for Muscle Spasm(s) or Pain.  -     ketorolac (TORADOL) 30 mg/mL (1 mL) injection; 1 mL by IntraMUSCular route once for 1 dose. -     KETOROLAC TROMETHAMINE INJ  -     AK THER/PROPH/DIAG INJECTION, SUBCUT/IM  -     REFERRAL TO PHYSICAL THERAPY  - Neurontin, 300 mg po TID prn for pain, pt stated he has that from previous Rx    2. Essential hypertension, controlled  -     hydrALAZINE (APRESOLINE) 100 mg tablet; Take 1 Tablet by mouth three (3) times daily.     3. Type 2 diabetes mellitus with stage 3b chronic kidney disease, with long-term current use of insulin ([address] controlled, continue glipizide and Januvia, advised to restart Lantus @ 5 units if glucose is above 150 in am.  -     AMB POC GLUCOSE BLOOD, BY GLUCOSE MONITORING DEVICE          Results for orders placed or performed in visit on 04/28/22   AMB POC GLUCOSE BLOOD, BY GLUCOSE MONITORING DEVICE   Result Value Ref Range    Glucose  MG/DL     Lab Results   Component Value Date/Time    Hemoglobin A1c 6.3 (H) 11/03/2021 09:06 AM    Hemoglobin A1c (POC) 6.9 02/15/2022 08:30 AM    Hemoglobin A1c, External 6.5 05/19/2021 12:00 AM

## 2022-04-28 NOTE — PATIENT INSTRUCTIONS
Piriformis Syndrome: Care Instructions  Your Care Instructions     The piriformis muscle is deep under your rear end (buttock). One end of the muscle connects deep inside the pelvic area, and the other end attaches to the top of the thighbone. This muscle can press on the sciatic nerve that runs from your spine down your leg. When this happens, you may have pain, numbness, and tingling in the buttock and down the back of your leg. This is called piriformis syndrome. The pain may get worse when you sit for a long time or climb stairs. Also, you may be more likely to develop piriformis syndrome if you run or walk often. Your doctor will check for other causes of your pain before treating this syndrome. Treatment may include stretching exercises, massage, and medicine for the pain and swelling. If these do not help, you may get a shot of steroid medicine. Until the pain is gone, you may need to rest the muscle and limit activities like running. Exercises and a change in how you move and sit may be enough to stop the pressure on the nerve. Follow-up care is a key part of your treatment and safety. Be sure to make and go to all appointments, and call your doctor if you are having problems. It's also a good idea to know your test results and keep a list of the medicines you take. How can you care for yourself at home? · If your doctor thinks that strenuous exercise is causing your problem, stop or cut back on activities such as running. You may find swimming to be a good exercise for a while. · Stretch the piriformis muscle. ? Lie on your back. ? Bend one leg at the knee and keep the other leg flat on the ground. ? Raise your bent knee up and then move it across your body. Hold the outside of the knee with the opposite hand. ? Gently pull the knee with your hand toward the opposite shoulder. ? Hold the stretch for at least 15 to 30 seconds. Switch legs. ? Do the stretch several times each day.   · Massage the muscle to relieve pressure. ? Sit on the floor. Lean to one side so that the hip on your sore side is off the ground. Put a tennis ball under your buttock on that side. ? As you put weight onto the tennis ball, you may find spots that are especially sore. Move gently so that the tennis ball gently massages each of the sore spots. · Use ice or heat to help reduce pain. Put ice or a cold pack or a heating pad set on low or a warm cloth on the sore area for 10 to 20 minutes at a time. Put a thin cloth between the ice pack or heating pad and your skin. · Ask your doctor if you can take an over-the-counter pain medicine, such as acetaminophen (Tylenol), ibuprofen (Advil, Motrin), or naproxen (Aleve). Be safe with medicines. Read and follow all instructions on the label. · Have your doctor or a physical therapist watch how you move. You may need physical therapy or special inserts in your shoes (orthotics) to help you move in a way that does not put pressure on your nerves. When should you call for help? Watch closely for changes in your health, and be sure to contact your doctor if:    · You do not feel better after several weeks of home care.     · Your pain gets worse.     · Your leg becomes weak or numb. Where can you learn more? Go to http://www.gray.com/  Enter C901 in the search box to learn more about \"Piriformis Syndrome: Care Instructions. \"  Current as of: July 1, 2021               Content Version: 13.2  © 2006-2022 [employer] Care instructions adapted under license by [employer] (which disclaims liability or warranty for this information). If you have questions about a medical condition or this instruction, always ask your healthcare professional. [place] [address] any warranty or liability for your use of this information.          Piriformis Syndrome: Exercises  Introduction  Here are some examples of exercises for you to try. The exercises may be suggested for a condition or for rehabilitation. Start each exercise slowly. Ease off the exercises if you start to have pain. You will be told when to start these exercises and which ones will work best for you. How to do the exercises  Hip rotator stretch    1. Lie on your back with both knees bent and your feet flat on the floor. 2. Put the ankle of your affected leg on your opposite thigh near your knee. 3. Use your hand to gently push your knee (on your affected leg) away from your body until you feel a gentle stretch around your hip. 4. Hold the stretch for 15 to 30 seconds. 5. Repeat 2 to 4 times. 6. Switch legs and repeat steps 1 through 5. Piriformis stretch    1. Lie on your back with your legs straight. 2. Lift your affected leg and bend your knee. With your opposite hand, reach across your body, and then gently pull your knee toward your opposite shoulder. 3. Hold the stretch for 15 to 30 seconds. 4. Repeat with your other leg. 5. Repeat 2 to 4 times on each side. Lower abdominal strengthening    1. Lie on your back with your knees bent and your feet flat on the floor. 2. Tighten your belly muscles by pulling your belly button in toward your spine. 3. Lift one foot off the floor and bring your knee toward your chest, so that your knee is straight above your hip and your leg is bent like the letter \"L. \"  4. Lift the other knee up to the same position. 5. Lower one leg at a time to the starting position. 6. Keep alternating legs until you have lifted each leg 8 to 12 times. 7. Be sure to keep your belly muscles tight and your back still as you are moving your legs. Be sure to breathe normally. Follow-up care is a key part of your treatment and safety. Be sure to make and go to all appointments, and call your doctor if you are having problems. It's also a good idea to know your test results and keep a list of the medicines you take.   Current as of: July 1, 2021               Content Version: 13.2  © 5137-2288 [employer]. Care instructions adapted under license by [employer] (which disclaims liability or warranty for this information). If you have questions about a medical condition or this instruction, always ask your healthcare professional. [address] any warranty or liability for your use of this information.

## 2022-05-06 ENCOUNTER — CLINIC PROCEDURE ONLY (OUTPATIENT)
Dept: URBAN - METROPOLITAN AREA CLINIC 1 | Facility: CLINIC | Age: 82
End: 2022-05-06

## 2022-05-06 DIAGNOSIS — H26.492: ICD-10-CM

## 2022-05-06 DIAGNOSIS — Z96.1: ICD-10-CM

## 2022-05-06 PROCEDURE — 66821 AFTER CATARACT LASER SURGERY: CPT

## 2022-05-06 ASSESSMENT — KERATOMETRY
OD_AXISANGLE2_DEGREES: 177
OD_K1POWER_DIOPTERS: 43.00
OS_K1POWER_DIOPTERS: 43.25
OS_K2POWER_DIOPTERS: 43.75
OS_AXISANGLE_DEGREES: 107
OD_AXISANGLE_DEGREES: 087
OD_K2POWER_DIOPTERS: 44.00
OS_AXISANGLE2_DEGREES: 017

## 2022-05-06 NOTE — PROCEDURE NOTE: CLINICAL
PROCEDURE NOTE: YAG Capsulotomy OS. Diagnosis: Posterior Capsular Opacity. Anesthesia: Topical. The purpose and nature of the procedure, possible alternative methods of treatment, the risks involved and the possibility of complications were discussed with patient. The Patient wishes to proceed and the consent was signed. 1 gtt Prolensa applied. The laser was then performed under topical anesthesia with no complications. Post op instructions were given to patient as well as a follow-up appointment. Patient was advised to call our office if any questions or concerns. Fiorella Moreno

## 2022-05-18 ENCOUNTER — HOSPITAL ENCOUNTER (OUTPATIENT)
Dept: LAB | Age: 82
Discharge: HOME | End: 2022-05-18
Payer: MEDICARE

## 2022-05-18 ENCOUNTER — APPOINTMENT (OUTPATIENT)
Dept: FAMILY MEDICINE CLINIC | Age: 82
End: 2022-05-18

## 2022-05-18 ENCOUNTER — OFFICE VISIT (OUTPATIENT)
Dept: FAMILY MEDICINE CLINIC | Age: 82
End: 2022-05-18
Payer: MEDICARE

## 2022-05-18 VITALS
SYSTOLIC BLOOD PRESSURE: 173 MMHG | DIASTOLIC BLOOD PRESSURE: 82 MMHG | BODY MASS INDEX: 29.4 KG/M2 | RESPIRATION RATE: 16 BRPM | OXYGEN SATURATION: 95 % | HEART RATE: 61 BPM | HEIGHT: 68 IN | WEIGHT: 194 LBS | TEMPERATURE: 98.2 F

## 2022-05-18 DIAGNOSIS — I10 ESSENTIAL HYPERTENSION: ICD-10-CM

## 2022-05-18 DIAGNOSIS — E87.5 HYPERKALEMIA: Primary | ICD-10-CM

## 2022-05-18 DIAGNOSIS — N18.32 TYPE 2 DIABETES MELLITUS WITH STAGE 3B CHRONIC KIDNEY DISEASE, WITH LONG-TERM CURRENT USE OF INSULIN (HCC): ICD-10-CM

## 2022-05-18 DIAGNOSIS — E78.2 MIXED HYPERLIPIDEMIA: ICD-10-CM

## 2022-05-18 DIAGNOSIS — Z79.4 TYPE 2 DIABETES MELLITUS WITH STAGE 3B CHRONIC KIDNEY DISEASE, WITH LONG-TERM CURRENT USE OF INSULIN (HCC): ICD-10-CM

## 2022-05-18 DIAGNOSIS — E11.22 TYPE 2 DIABETES MELLITUS WITH STAGE 3B CHRONIC KIDNEY DISEASE, WITH LONG-TERM CURRENT USE OF INSULIN (HCC): ICD-10-CM

## 2022-05-18 DIAGNOSIS — I10 ESSENTIAL HYPERTENSION: Primary | ICD-10-CM

## 2022-05-18 LAB
ALBUMIN SERPL-MCNC: 3.7 G/DL (ref 3.4–5)
ALBUMIN/GLOB SERPL: 1.2 {RATIO} (ref 0.8–1.7)
ALP SERPL-CCNC: 66 U/L (ref 45–117)
ALT SERPL-CCNC: 17 U/L (ref 16–61)
ANION GAP SERPL CALC-SCNC: 3 MMOL/L (ref 3–18)
AST SERPL-CCNC: 14 U/L (ref 10–38)
BILIRUB SERPL-MCNC: 0.5 MG/DL (ref 0.2–1)
BUN SERPL-MCNC: 25 MG/DL (ref 7–18)
BUN/CREAT SERPL: 15 (ref 12–20)
CALCIUM SERPL-MCNC: 9.7 MG/DL (ref 8.5–10.1)
CHLORIDE SERPL-SCNC: 115 MMOL/L (ref 100–111)
CHOLEST SERPL-MCNC: 122 MG/DL
CO2 SERPL-SCNC: 27 MMOL/L (ref 21–32)
CREAT SERPL-MCNC: 1.65 MG/DL (ref 0.6–1.3)
EST. AVERAGE GLUCOSE BLD GHB EST-MCNC: 143 MG/DL
GLOBULIN SER CALC-MCNC: 3.2 G/DL (ref 2–4)
GLUCOSE SERPL-MCNC: 179 MG/DL (ref 74–99)
HBA1C MFR BLD: 6.6 % (ref 4.2–5.6)
HDLC SERPL-MCNC: 50 MG/DL (ref 40–60)
HDLC SERPL: 2.4 {RATIO} (ref 0–5)
LDLC SERPL CALC-MCNC: 55.6 MG/DL (ref 0–100)
LIPID PROFILE,FLP: NORMAL
POTASSIUM SERPL-SCNC: 5.9 MMOL/L (ref 3.5–5.5)
PROT SERPL-MCNC: 6.9 G/DL (ref 6.4–8.2)
SODIUM SERPL-SCNC: 145 MMOL/L (ref 136–145)
TRIGL SERPL-MCNC: 82 MG/DL (ref ?–150)
VLDLC SERPL CALC-MCNC: 16.4 MG/DL

## 2022-05-18 PROCEDURE — 3044F HG A1C LEVEL LT 7.0%: CPT | Performed by: INTERNAL MEDICINE

## 2022-05-18 PROCEDURE — 80061 LIPID PANEL: CPT

## 2022-05-18 PROCEDURE — G8536 NO DOC ELDER MAL SCRN: HCPCS | Performed by: INTERNAL MEDICINE

## 2022-05-18 PROCEDURE — 1101F PT FALLS ASSESS-DOCD LE1/YR: CPT | Performed by: INTERNAL MEDICINE

## 2022-05-18 PROCEDURE — G8753 SYS BP > OR = 140: HCPCS | Performed by: INTERNAL MEDICINE

## 2022-05-18 PROCEDURE — 80053 COMPREHEN METABOLIC PANEL: CPT

## 2022-05-18 PROCEDURE — G8427 DOCREV CUR MEDS BY ELIG CLIN: HCPCS | Performed by: INTERNAL MEDICINE

## 2022-05-18 PROCEDURE — 36415 COLL VENOUS BLD VENIPUNCTURE: CPT

## 2022-05-18 PROCEDURE — 83036 HEMOGLOBIN GLYCOSYLATED A1C: CPT

## 2022-05-18 PROCEDURE — G8510 SCR DEP NEG, NO PLAN REQD: HCPCS | Performed by: INTERNAL MEDICINE

## 2022-05-18 PROCEDURE — G8417 CALC BMI ABV UP PARAM F/U: HCPCS | Performed by: INTERNAL MEDICINE

## 2022-05-18 PROCEDURE — 99214 OFFICE O/P EST MOD 30 MIN: CPT | Performed by: INTERNAL MEDICINE

## 2022-05-18 PROCEDURE — G8754 DIAS BP LESS 90: HCPCS | Performed by: INTERNAL MEDICINE

## 2022-05-18 RX ORDER — LOSARTAN POTASSIUM 50 MG/1
50 TABLET ORAL DAILY
Qty: 90 TABLET | Refills: 0 | Status: SHIPPED | OUTPATIENT
Start: 2022-05-18 | End: 2022-08-09

## 2022-05-18 NOTE — PROGRESS NOTES
HISTORY OF PRESENT ILLNESS  Jayne Duffy is a [de-identified] y.o. male. HPI  HTN, not well controlled, his BP is elevated, he is taking Bystolic/hydralazine daily  DM with CKD, stable, glucose has been 110-120 in am, he lost 10 lbs since last visit, he is not taking any insulin now, he is on glipizide and Januvia daily  HLD, controlled on lipitor daily  Review of Systems   Constitutional: Negative for fever. Respiratory: Negative for cough and shortness of breath. Cardiovascular: Negative for chest pain and leg swelling. Gastrointestinal: Negative for abdominal pain and nausea. Neurological: Negative for headaches. Physical Exam  Vitals reviewed. Cardiovascular:      Rate and Rhythm: Normal rate and regular rhythm. Heart sounds: Normal heart sounds. No murmur heard. Pulmonary:      Effort: Pulmonary effort is normal.      Breath sounds: Normal breath sounds. Abdominal:      Palpations: Abdomen is soft. Tenderness: There is no abdominal tenderness. Skin:     Comments: Feet:normal sensation to microfilament test, good pulse, no callus. Neurological:      Mental Status: He is oriented to person, place, and time. ASSESSMENT and PLAN  Diagnoses and all orders for this visit:    1. Essential hypertension, not well controlled, will add cozaar since his M/C ratio was 229 in January when done by Nephrology  -     METABOLIC PANEL, COMPREHENSIVE; Future  -     losartan (COZAAR) 50 mg tablet; Take 1 Tablet by mouth daily. 2. Type 2 diabetes mellitus with stage 3b chronic kidney disease, with long-term current use of insulin ([address] controlled, continue Januvia/glipizide @ current dose  -     METABOLIC PANEL, COMPREHENSIVE; Future  -     HEMOGLOBIN A1C WITH EAG; Future    3. Mixed hyperlipidemia, controlled, continue Lipitor 10 mg daily  -     LIPID PANEL; Future  -     METABOLIC PANEL, COMPREHENSIVE; Future      Follow-up and Dispositions    · Return in about 5 weeks (around 6/22/2022).

## 2022-05-18 NOTE — PROGRESS NOTES
Gume Lay is a [de-identified] y.o. male (: 1940) presenting to address:    Chief Complaint   Patient presents with    Medication Evaluation       Vitals:    22 0748   BP: (!) 173/82   Pulse: 61   Resp: 16   Temp: 98.2 °F (36.8 °C)   TempSrc: Temporal   SpO2: 95%   Weight: 194 lb (88 kg)   Height: 5' 8\" (1.727 m)   PainSc:   0 - No pain       Hearing/Vision:   No exam data present    Learning Assessment:     Learning Assessment 2017   PRIMARY LEARNER Patient   HIGHEST LEVEL OF EDUCATION - PRIMARY LEARNER  4 YEARS OF COLLEGE   BARRIERS PRIMARY LEARNER NONE   CO-LEARNER CAREGIVER No   PRIMARY LANGUAGE ENGLISH    NEED No   LEARNER PREFERENCE PRIMARY VIDEOS   ANSWERED BY self   RELATIONSHIP SELF     Depression Screening:     3 most recent PHQ Screens 2022   Little interest or pleasure in doing things Not at all   Feeling down, depressed, irritable, or hopeless Not at all   Total Score PHQ 2 0     Fall Risk Assessment:     Fall Risk Assessment, last 12 mths 2022   Able to walk? Yes   Fall in past 12 months? 0   Do you feel unsteady? 0   Are you worried about falling 0   Number of falls in past 12 months -   Fall with injury? -     Abuse Screening:     Abuse Screening Questionnaire 2022   Do you ever feel afraid of your partner? N   Are you in a relationship with someone who physically or mentally threatens you? N   Is it safe for you to go home?  Y     ADL Assessment:     ADL Assessment 2021   Feeding yourself No Help Needed   Getting from bed to chair No Help Needed   Getting dressed No Help Needed   Bathing or showering No Help Needed   Walk across the room (includes cane/walker) No Help Needed   Using the telphone No Help Needed   Taking your medications No Help Needed   Preparing meals No Help Needed   Managing money (expenses/bills) No Help Needed   Moderately strenuous housework (laundry) No Help Needed   Shopping for personal items (toiletries/medicines) No Help Needed Shopping for groceries No Help Needed   Driving No Help Needed   Climbing a flight of stairs No Help Needed   Getting to places beyond walking distances No Help Needed        Coordination of Care Questionaire:   1. \"Have you been to the ER, urgent care clinic since your last visit? Hospitalized since your last visit? \" No    2. \"Have you seen or consulted any other health care providers outside of the [address] [place] [address] [facility] since your last visit? \" Yes Where: Ophtamology     3. For patients aged 39-70: Has the patient had a colonoscopy? NA - based on age     Advanced Directive:   1. Do you have an Advanced Directive? YES    2. Would you like information on Advanced Directives?  NO

## 2022-05-18 NOTE — PROGRESS NOTES
Discussed with pt today  A1c is good @ 166  Cholesterol is controlled  Creatinine/GFR are stable,similar to January labs  K is elevated, will repeat K level in few days to confirm, lab ordered.

## 2022-05-25 ENCOUNTER — HOSPITAL ENCOUNTER (OUTPATIENT)
Dept: LAB | Age: 82
Discharge: HOME | End: 2022-05-25
Payer: MEDICARE

## 2022-05-25 ENCOUNTER — APPOINTMENT (OUTPATIENT)
Dept: FAMILY MEDICINE CLINIC | Age: 82
End: 2022-05-25

## 2022-05-25 DIAGNOSIS — E87.5 HYPERKALEMIA: ICD-10-CM

## 2022-05-25 LAB — POTASSIUM SERPL-SCNC: 5.7 MMOL/L (ref 3.5–5.5)

## 2022-05-25 PROCEDURE — 36415 COLL VENOUS BLD VENIPUNCTURE: CPT

## 2022-05-25 PROCEDURE — 84132 ASSAY OF SERUM POTASSIUM: CPT

## 2022-05-26 NOTE — PROGRESS NOTES
I discussed with pt earlier  K is better, still slightly elevated, please fax copy to Dr Harriett Lema, Nephrology  Advised pt to continue Hydralazine TID and NOT to start losartan since his K is elevated, he verbalized understanding,

## 2022-05-31 ENCOUNTER — POST-OP (OUTPATIENT)
Dept: URBAN - METROPOLITAN AREA CLINIC 1 | Facility: CLINIC | Age: 82
End: 2022-05-31

## 2022-05-31 DIAGNOSIS — Z98.890: ICD-10-CM

## 2022-05-31 PROCEDURE — 99024 POSTOP FOLLOW-UP VISIT: CPT

## 2022-05-31 ASSESSMENT — KERATOMETRY
OS_AXISANGLE_DEGREES: 107
OS_K1POWER_DIOPTERS: 43.25
OS_K2POWER_DIOPTERS: 43.75
OS_AXISANGLE2_DEGREES: 017
OD_K1POWER_DIOPTERS: 43.00
OD_K2POWER_DIOPTERS: 44.00
OD_AXISANGLE_DEGREES: 087
OD_AXISANGLE2_DEGREES: 177

## 2022-05-31 ASSESSMENT — VISUAL ACUITY
OS_CC: 20/25
OD_CC: 20/25

## 2022-05-31 ASSESSMENT — TONOMETRY
OD_IOP_MMHG: 12
OS_IOP_MMHG: 12

## 2022-06-20 ENCOUNTER — TELEPHONE (OUTPATIENT)
Dept: FAMILY MEDICINE CLINIC | Age: 82
End: 2022-06-20

## 2022-06-20 NOTE — TELEPHONE ENCOUNTER
----- Message from ticketscript sent at 6/20/2022  3:02 PM EDT -----  Subject: Referral Request    QUESTIONS   Reason for referral request? Patient is requesting an order for an INR   Has the physician seen you for this condition before? Yes  Select a date? 2022-05-18  Select the Provider the patient wants to be referred to, if known (PCP or   Specialist)? Ashley Hernandez   Preferred Specialist (if applicable)? Do you already have an appointment scheduled? Yes  Select Scheduled Date? 2022-08-09  Select Scheduled Physician? Ashley Hernandez   Additional Information for Provider?   ---------------------------------------------------------------------------  --------------  Triston SHAH  What is the best way for the office to contact you? OK to leave message on   voicemail  Preferred Call Back Phone Number? [phone]  ---------------------------------------------------------------------------  --------------  SCRIPT ANSWERS  Relationship to Patient?  Self

## 2022-07-08 ENCOUNTER — TELEPHONE (OUTPATIENT)
Dept: PHARMACY | Age: 82
End: 2022-07-08

## 2022-07-08 NOTE — LETTER
[address]  [address], [address]        [place] Ashton   [address] [address]           07/12/22     Dear Austin Ashton,    We tried to reach you recently regarding your Glipizide, but were unable to reach you on the telephone. If you are no longer taking or taking differently, please call us at [phone] Option 2, so that we can discuss this and update your medication profile. It appears that this medication has not been filled at proper times. We are worried you might be missing doses or not taking it as directed. It is important that you take your medications regularly and try not to miss a single dose.     Some ways to help you remember to take and refill your medications are to:  · Use a pill box, set an alarm, and/or keep your medication near something that you do every day  · Fill a 3-month supply of your prescription at a time to save you time and trips to the pharmacy  if you would like assistance in switching your prescriptions to a 3-month supply, please contact [phone] Option 2  · Ask your pharmacy if they participate in [facility]", a program where you can  all of your medications on the same day  · Ask your pharmacy if you can be set up with automatic refill, where they will automatically refill your prescription when it is due and let you know it's ready to     Sincerely,   Pauline Miner, PharmD, [address] [facility], toll free: [phone] Option 2

## 2022-07-08 NOTE — TELEPHONE ENCOUNTER
[facility] CLINICAL PHARMACY: ADHERENCE REVIEW  Identified care gap per [employer]: fills at [facility]: Diabetes adherence    Last Visit: 05/18/2022    ASSESSMENT  ACE/ARB ADHERENCE    Insurance Records claims through 07/08/2022 (2021 [place] = NA; SEAN Kylera = Filled only once; Potential Fail Date: 09/30/2022): Losartan last filled on 05/18/2022 for 90 day supply. Next refill due: 08/16/2022    Per [facility]:   Losartan last picked up on 05/18/2022 for 90 day supply. 0 refills remaining. Billed through [employer]   Will run out before next OV    BP Readings from Last 3 Encounters:   05/18/22 (!) 173/82   04/28/22 136/70   02/26/22 (!) 134/56     Estimated Creatinine Clearance: 37.8 mL/min (A) (by C-G formula based on SCr of 1.65 mg/dL (H)). DIABETES ADHERENCE    Insurance Records claims through 07/08/2022 (2021 [place] = NA; SEAN Rickey Bernsteinandra = 100%; Potential Fail Date: 10/09/2022): Januvia last filled on 04/29/2022 for 90 day supply. Next refill due: 07/28/2022  Glipizide last filled on 02/15/2022 for 90 day supply. Next refill due: 05/16/2022-PAST DUE (53 DAYS)    Per [facility]:   Larwence Saunas last picked up on 04/29/2022 for 90 day supply. 1 refills remaining. Billed through [employer]  Glipizide last picked up on 02/15/2022 for 90 day supply. 1 refills remaining. Billed through [employer]    Per OV 5/18/2022 Dr Patiño Presume: \"DM with CKD, stable, glucose has been 110-120 in am, he lost 10 lbs since last visit, he is not taking any insulin now, he is on glipizide and Januvia daily\"  ASSESSMENT and PLAN  \"2.  Type 2 diabetes mellitus with stage 3b chronic kidney disease, with long-term current use of insulin ([address] controlled, continue Januvia/glipizide @ current dose\"  Lab Results   Component Value Date/Time    Hemoglobin A1c 6.6 (H) 05/18/2022 08:48 AM    Hemoglobin A1c 6.3 (H) 11/03/2021 09:06 AM    Hemoglobin A1c 7.8 (H) 09/04/2019 08:14 AM    Hemoglobin A1c (POC) 6.9 02/15/2022 08:30 AM    Hemoglobin A1c (POC) 9.0 03/25/2021 08:39 AM    Hemoglobin A1c (POC) 7.9 09/11/2020 02:31 PM    Hemoglobin A1c, External 6.5 05/19/2021 12:00 AM    Hemoglobin A1c, External 8.3 01/14/2019 12:00 AM     NOTE A1c <9%    STATIN ADHERENCE    Insurance Records claims through 07/08/2022 (2021 [place] = NA; YTD Rickey Shepard = 100%; Potential Fail Date: 10/01/2022): Atorvastatin last filled on 04/21/2022 for 90 day supply. Next refill due: 07/20/2022    Per [facility]:   Atorvastatin last picked up on 04/21/2022 for 90 day supply. 1 refills remaining. Billed through [facility] Value Date/Time    Cholesterol, total 122 05/18/2022 08:48 AM    HDL Cholesterol 50 05/18/2022 08:48 AM    LDL-C, External 60 05/19/2021 12:00 AM    LDL, calculated 55.6 05/18/2022 08:48 AM    VLDL, calculated 16.4 05/18/2022 08:48 AM    Triglyceride 82 05/18/2022 08:48 AM    CHOL/HDL Ratio 2.4 05/18/2022 08:48 AM     ALT (SGPT)   Date Value Ref Range Status   05/18/2022 17 16 - 61 U/L Final     AST (SGOT)   Date Value Ref Range Status   05/18/2022 14 10 - 38 U/L Final     The ASCVD Risk score (Abigail Cruz et al., 2013) failed to calculate for the following reasons: The 2013 ASCVD risk score is only valid for ages 36 to 78    The patient has a prior MI or stroke diagnosis     PLAN  The following are interventions that have been identified:  - Patient overdue refilling Glipizide and due in 12 days for Atorvastatin and 20 days for Januvia and active on home medication list    Attempting to reach patient to review.  Left message asking for return call.  and [employer] message sent to patient    Lm x2 on 7/12/2022- sent adherence letter    Future Appointments   Date Time Provider Chrissy Walton   8/19/2022 11:30 AM MD UDAY Merino BS PRIETO Banegas, PharmD, [address], toll free: [phone] Option 2  ================================================================================  For Pharmacy Admin Tracking Only     Gap Closed?: No   Time Spent (min): 15

## 2022-07-10 RX ORDER — FLUTICASONE PROPIONATE 50 MCG
SPRAY, SUSPENSION (ML) NASAL
Qty: 48 G | Refills: 3 | Status: SHIPPED | OUTPATIENT
Start: 2022-07-10

## 2022-07-20 DIAGNOSIS — I10 ESSENTIAL HYPERTENSION: ICD-10-CM

## 2022-07-20 RX ORDER — LOSARTAN POTASSIUM 50 MG/1
50 TABLET ORAL DAILY
Qty: 90 TABLET | Refills: 3 | OUTPATIENT
Start: 2022-07-20

## 2022-08-09 ENCOUNTER — OFFICE VISIT (OUTPATIENT)
Dept: FAMILY MEDICINE CLINIC | Age: 82
End: 2022-08-09

## 2022-08-09 VITALS
RESPIRATION RATE: 18 BRPM | DIASTOLIC BLOOD PRESSURE: 70 MMHG | TEMPERATURE: 97.7 F | OXYGEN SATURATION: 95 % | SYSTOLIC BLOOD PRESSURE: 136 MMHG | WEIGHT: 200 LBS | BODY MASS INDEX: 30.31 KG/M2 | HEIGHT: 68 IN | HEART RATE: 57 BPM

## 2022-08-09 DIAGNOSIS — E78.2 MIXED HYPERLIPIDEMIA: ICD-10-CM

## 2022-08-09 DIAGNOSIS — I10 ESSENTIAL HYPERTENSION: ICD-10-CM

## 2022-08-09 DIAGNOSIS — N18.32 TYPE 2 DIABETES MELLITUS WITH STAGE 3B CHRONIC KIDNEY DISEASE, WITHOUT LONG-TERM CURRENT USE OF INSULIN (HCC): Primary | ICD-10-CM

## 2022-08-09 DIAGNOSIS — E11.22 TYPE 2 DIABETES MELLITUS WITH STAGE 3B CHRONIC KIDNEY DISEASE, WITHOUT LONG-TERM CURRENT USE OF INSULIN (HCC): Primary | ICD-10-CM

## 2022-08-09 PROCEDURE — G8432 DEP SCR NOT DOC, RNG: HCPCS | Performed by: INTERNAL MEDICINE

## 2022-08-09 PROCEDURE — 99214 OFFICE O/P EST MOD 30 MIN: CPT | Performed by: INTERNAL MEDICINE

## 2022-08-09 PROCEDURE — G8427 DOCREV CUR MEDS BY ELIG CLIN: HCPCS | Performed by: INTERNAL MEDICINE

## 2022-08-09 PROCEDURE — G8417 CALC BMI ABV UP PARAM F/U: HCPCS | Performed by: INTERNAL MEDICINE

## 2022-08-09 PROCEDURE — G8536 NO DOC ELDER MAL SCRN: HCPCS | Performed by: INTERNAL MEDICINE

## 2022-08-09 PROCEDURE — 1101F PT FALLS ASSESS-DOCD LE1/YR: CPT | Performed by: INTERNAL MEDICINE

## 2022-08-09 PROCEDURE — 3044F HG A1C LEVEL LT 7.0%: CPT | Performed by: INTERNAL MEDICINE

## 2022-08-09 PROCEDURE — 1123F ACP DISCUSS/DSCN MKR DOCD: CPT | Performed by: INTERNAL MEDICINE

## 2022-08-09 PROCEDURE — G8754 DIAS BP LESS 90: HCPCS | Performed by: INTERNAL MEDICINE

## 2022-08-09 PROCEDURE — G8752 SYS BP LESS 140: HCPCS | Performed by: INTERNAL MEDICINE

## 2022-08-09 NOTE — PROGRESS NOTES
HISTORY OF PRESENT ILLNESS  Ty Alicea is a 81 y.o. male. HPI  DM with CKD, stable, glucose  in am, last A1c was 6.6, he is on [place] and [place] daily, he has not used any insulin for 8 months. HTN, stable, on hydralazine/lasix and Bystolic  HLD, controlled on lipitor daily  Followed by Nephrology for his CKD, he will have labs done for Dr Cristina Smith in 3 weeks including BMP/PTH/Vit D/CBC/ urine M/C ratio. Review of Systems   Constitutional:  Negative for fever. Respiratory:  Negative for cough and shortness of breath. Cardiovascular:  Negative for chest pain and palpitations. Gastrointestinal:  Negative for abdominal pain and nausea. Physical Exam  Vitals reviewed. Cardiovascular:      Rate and Rhythm: Normal rate and regular rhythm. Heart sounds: Normal heart sounds. No murmur heard. Pulmonary:      Effort: Pulmonary effort is normal.      Breath sounds: Normal breath sounds. Abdominal:      Palpations: Abdomen is soft. Tenderness: There is no abdominal tenderness. Musculoskeletal:      Right lower leg: Edema (trace) present. Left lower leg: Edema (trace.) present. Neurological:      Mental Status: He is oriented to person, place, and time. ASSESSMENT and PLAN  Diagnoses and all orders for this visit:    1. Type 2 diabetes mellitus with stage 3b chronic kidney disease, without long-term current use of insulin ([address] stable  -     HEMOGLOBIN A1C WITH EAG; Future  Continue diet and Januvia 50 mg daily    2. Essential hypertension, stable, continue hydralazine/bystolic and lasix @ current doses    3. Mixed hyperlipidemia, stable, continue Lipitor 10 mg daily.  -     LIPID PANEL; Future  -     HEPATIC FUNCTION PANEL; Future      Follow-up and Dispositions    Return in about 4 months (around 12/2/2022) for Froylan Helton  with next visit.        Lab Results   Component Value Date/Time    Hemoglobin A1c 6.6 (H) 05/18/2022 08:48 AM    Hemoglobin A1c (POC) 6.9 02/15/2022 08:30 AM    Hemoglobin A1c, External 6.5 05/19/2021 12:00 AM             Basic Metabolic Panel  Specimen:  Blood - Blood (substance)   Ref Range & Units 1 mo ago Comments   Potassium 3.5 - 5.5 mmol/L 4.8     Sodium 133 - 145 mmol/L 136     Chloride 98 - 110 mmol/L 101     Glucose 70 - 99 mg/dL 186 High      Calcium 8.4 - 10.5 mg/dL 9.3     BUN 6 - 22 mg/dL 43 High      Creatinine 0.8 - 1.6 mg/dL 1.9 High      CO2 20 - 32 mmol/L 24     eGFR >60.0 mL/min/1.73 sq.m. 34.6 Low   eGFR calculation based on the Chronic Kidney Disease Epidemiology Collaboration (CKD-EPI) equation refit without adjustment for race. This eGFR is validated for stable chronic renal failure patients. This equation is unreliable in acute illness or patients with normal renal function. Anion Gap 3.0 - 15.0 mmol/L 11.0  Anion Gap calculation based on electrolyte reference ranges.    Resulting Agency  [facility]    Specimen Collected: 06/14/22 08:30 Last Resulted: 06/14/22 12:50

## 2022-08-09 NOTE — PROGRESS NOTES
Emiliano Park is a 81 y.o. male (: 1940) presenting to address:    Chief Complaint   Patient presents with    Medication Evaluation       Vitals:    22 0928   BP: (!) 163/66   Pulse: (!) 57   Resp: 18   Temp: 97.7 °F (36.5 °C)   TempSrc: Temporal   SpO2: 95%   Weight: 200 lb (90.7 kg)   Height: 5' 8\" (1.727 m)   PainSc:   0 - No pain       Hearing/Vision:   No results found. Learning Assessment:     Learning Assessment 2017   PRIMARY LEARNER Patient   HIGHEST LEVEL OF EDUCATION - PRIMARY LEARNER  4 YEARS OF COLLEGE   BARRIERS PRIMARY LEARNER NONE   CO-LEARNER CAREGIVER No   PRIMARY LANGUAGE ENGLISH    NEED No   LEARNER PREFERENCE PRIMARY VIDEOS   ANSWERED BY self   RELATIONSHIP SELF     Depression Screening:     3 most recent PHQ Screens 2022   Little interest or pleasure in doing things Not at all   Feeling down, depressed, irritable, or hopeless Not at all   Total Score PHQ 2 0     Fall Risk Assessment:     Fall Risk Assessment, last 12 mths 2022   Able to walk? Yes   Fall in past 12 months? 0   Do you feel unsteady? 0   Are you worried about falling 0   Number of falls in past 12 months -   Fall with injury? -     Abuse Screening:     Abuse Screening Questionnaire 2022   Do you ever feel afraid of your partner? N   Are you in a relationship with someone who physically or mentally threatens you? N   Is it safe for you to go home?  Y     ADL Assessment:     ADL Assessment 2021   Feeding yourself No Help Needed   Getting from bed to chair No Help Needed   Getting dressed No Help Needed   Bathing or showering No Help Needed   Walk across the room (includes cane/walker) No Help Needed   Using the telphone No Help Needed   Taking your medications No Help Needed   Preparing meals No Help Needed   Managing money (expenses/bills) No Help Needed   Moderately strenuous housework (laundry) No Help Needed   Shopping for personal items (toiletries/medicines) No Help Needed Shopping for groceries No Help Needed   Driving No Help Needed   Climbing a flight of stairs No Help Needed   Getting to places beyond walking distances No Help Needed        Coordination of Care Questionaire:   1. \"Have you been to the ER, urgent care clinic since your last visit? Hospitalized since your last visit? \" No    2. \"Have you seen or consulted any other health care providers outside of the [address] [place] [address] [facility] since your last visit? \" No     3. For patients aged 39-70: Has the patient had a colonoscopy? NA - based on age     Advanced Directive:   1. Do you have an Advanced Directive? YES    2. Would you like information on Advanced Directives?  NO

## 2022-08-30 LAB
A-G RATIO,AGRAT: 1.3 RATIO (ref 1.1–2.6)
ALBUMIN SERPL-MCNC: 4 G/DL (ref 3.5–5)
ALP SERPL-CCNC: 77 U/L (ref 40–125)
ALT SERPL-CCNC: 9 U/L (ref 5–40)
AST SERPL W P-5'-P-CCNC: 12 U/L (ref 10–37)
AVG GLU, 10930: 181 MG/DL (ref 91–123)
BILIRUB SERPL-MCNC: 0.5 MG/DL (ref 0.2–1.2)
BILIRUBIN, DIRECT,CBIL: <0.2 MG/DL (ref 0–0.3)
CHOLEST SERPL-MCNC: 137 MG/DL (ref 110–200)
GLOBULIN,GLOB: 3.1 G/DL (ref 2–4)
HBA1C MFR BLD HPLC: 7.9 % (ref 4.8–5.6)
HDLC SERPL-MCNC: 3 MG/DL (ref 0–5)
HDLC SERPL-MCNC: 45 MG/DL
LDL/HDL RATIO,LDHD: 1.7
LDLC SERPL CALC-MCNC: 76 MG/DL (ref 50–99)
NON-HDL CHOLESTEROL, 011976: 92 MG/DL
PROT SERPL-MCNC: 7.1 G/DL (ref 6.2–8.1)
TRIGL SERPL-MCNC: 82 MG/DL (ref 40–149)
VLDLC SERPL CALC-MCNC: 16 MG/DL (ref 8–30)

## 2022-08-30 NOTE — PROGRESS NOTES
Cholesterol is controlled  A1c is elevated @ 7.9, up from 6.6 last labs !, recommend he restart Lantus @ low dose, 5 units qhs, and monitor glucose q am, and call with readings in a week.

## 2022-08-31 NOTE — PROGRESS NOTES
Patient advised of results. He was advised to call in a week with his readings, he voiced understanding.

## 2022-09-16 DIAGNOSIS — E11.22 TYPE 2 DIABETES MELLITUS WITH STAGE 3B CHRONIC KIDNEY DISEASE, WITH LONG-TERM CURRENT USE OF INSULIN (HCC): ICD-10-CM

## 2022-09-16 DIAGNOSIS — Z79.4 TYPE 2 DIABETES MELLITUS WITH STAGE 3B CHRONIC KIDNEY DISEASE, WITH LONG-TERM CURRENT USE OF INSULIN (HCC): ICD-10-CM

## 2022-09-16 DIAGNOSIS — N18.32 TYPE 2 DIABETES MELLITUS WITH STAGE 3B CHRONIC KIDNEY DISEASE, WITH LONG-TERM CURRENT USE OF INSULIN (HCC): ICD-10-CM

## 2022-09-17 DIAGNOSIS — I10 ESSENTIAL HYPERTENSION: ICD-10-CM

## 2022-09-18 RX ORDER — NEBIVOLOL 5 MG/1
TABLET ORAL
Qty: 90 TABLET | Refills: 3 | Status: SHIPPED | OUTPATIENT
Start: 2022-09-18

## 2022-10-14 DIAGNOSIS — E11.22 TYPE 2 DIABETES MELLITUS WITH STAGE 3B CHRONIC KIDNEY DISEASE, WITH LONG-TERM CURRENT USE OF INSULIN (HCC): ICD-10-CM

## 2022-10-14 DIAGNOSIS — Z79.4 TYPE 2 DIABETES MELLITUS WITH STAGE 3B CHRONIC KIDNEY DISEASE, WITH LONG-TERM CURRENT USE OF INSULIN (HCC): ICD-10-CM

## 2022-10-14 DIAGNOSIS — N18.32 TYPE 2 DIABETES MELLITUS WITH STAGE 3B CHRONIC KIDNEY DISEASE, WITH LONG-TERM CURRENT USE OF INSULIN (HCC): ICD-10-CM

## 2022-10-16 RX ORDER — GLIPIZIDE 2.5 MG/1
TABLET, EXTENDED RELEASE ORAL
Qty: 90 TABLET | Refills: 3 | Status: SHIPPED | OUTPATIENT
Start: 2022-10-16

## 2022-12-02 ENCOUNTER — HOSPITAL ENCOUNTER (OUTPATIENT)
Dept: LAB | Age: 82
End: 2022-12-02
Payer: MEDICARE

## 2022-12-02 ENCOUNTER — APPOINTMENT (OUTPATIENT)
Dept: FAMILY MEDICINE CLINIC | Age: 82
End: 2022-12-02

## 2022-12-02 ENCOUNTER — OFFICE VISIT (OUTPATIENT)
Dept: FAMILY MEDICINE CLINIC | Age: 82
End: 2022-12-02
Payer: MEDICARE

## 2022-12-02 VITALS
HEART RATE: 59 BPM | BODY MASS INDEX: 29.86 KG/M2 | WEIGHT: 197 LBS | RESPIRATION RATE: 18 BRPM | HEIGHT: 68 IN | DIASTOLIC BLOOD PRESSURE: 70 MMHG | TEMPERATURE: 98.2 F | OXYGEN SATURATION: 93 % | SYSTOLIC BLOOD PRESSURE: 144 MMHG

## 2022-12-02 DIAGNOSIS — E78.2 MIXED HYPERLIPIDEMIA: ICD-10-CM

## 2022-12-02 DIAGNOSIS — I10 ESSENTIAL HYPERTENSION: ICD-10-CM

## 2022-12-02 DIAGNOSIS — Z23 ENCOUNTER FOR IMMUNIZATION: ICD-10-CM

## 2022-12-02 DIAGNOSIS — Z00.00 MEDICARE ANNUAL WELLNESS VISIT, SUBSEQUENT: Primary | ICD-10-CM

## 2022-12-02 DIAGNOSIS — E53.8 B12 DEFICIENCY: ICD-10-CM

## 2022-12-02 DIAGNOSIS — E11.22 DIABETES MELLITUS WITH STAGE 3 CHRONIC KIDNEY DISEASE (HCC): ICD-10-CM

## 2022-12-02 DIAGNOSIS — N18.30 DIABETES MELLITUS WITH STAGE 3 CHRONIC KIDNEY DISEASE (HCC): ICD-10-CM

## 2022-12-02 LAB
ALBUMIN SERPL-MCNC: 3.7 G/DL (ref 3.4–5)
ALBUMIN/GLOB SERPL: 1.1 {RATIO} (ref 0.8–1.7)
ALP SERPL-CCNC: 79 U/L (ref 45–117)
ALT SERPL-CCNC: 13 U/L (ref 16–61)
ANION GAP SERPL CALC-SCNC: 5 MMOL/L (ref 3–18)
AST SERPL-CCNC: 8 U/L (ref 10–38)
BASOPHILS # BLD: 0.1 K/UL (ref 0–0.1)
BASOPHILS NFR BLD: 1 % (ref 0–2)
BILIRUB SERPL-MCNC: 0.6 MG/DL (ref 0.2–1)
BUN SERPL-MCNC: 48 MG/DL (ref 7–18)
BUN/CREAT SERPL: 20 (ref 12–20)
CALCIUM SERPL-MCNC: 9.9 MG/DL (ref 8.5–10.1)
CHLORIDE SERPL-SCNC: 109 MMOL/L (ref 100–111)
CO2 SERPL-SCNC: 27 MMOL/L (ref 21–32)
CREAT SERPL-MCNC: 2.36 MG/DL (ref 0.6–1.3)
DIFFERENTIAL METHOD BLD: ABNORMAL
EOSINOPHIL # BLD: 0.2 K/UL (ref 0–0.4)
EOSINOPHIL NFR BLD: 2 % (ref 0–5)
ERYTHROCYTE [DISTWIDTH] IN BLOOD BY AUTOMATED COUNT: 11.9 % (ref 11.6–14.5)
FOLATE SERPL-MCNC: 7.5 NG/ML (ref 3.1–17.5)
GLOBULIN SER CALC-MCNC: 3.5 G/DL (ref 2–4)
GLUCOSE SERPL-MCNC: 193 MG/DL (ref 74–99)
HBA1C MFR BLD HPLC: 9 %
HCT VFR BLD AUTO: 36.1 % (ref 36–48)
HGB BLD-MCNC: 11.8 G/DL (ref 13–16)
IMM GRANULOCYTES # BLD AUTO: 0 K/UL (ref 0–0.04)
IMM GRANULOCYTES NFR BLD AUTO: 0 % (ref 0–0.5)
LYMPHOCYTES # BLD: 2.1 K/UL (ref 0.9–3.6)
LYMPHOCYTES NFR BLD: 26 % (ref 21–52)
MCH RBC QN AUTO: 30.8 PG (ref 24–34)
MCHC RBC AUTO-ENTMCNC: 32.7 G/DL (ref 31–37)
MCV RBC AUTO: 94.3 FL (ref 78–100)
MONOCYTES # BLD: 0.7 K/UL (ref 0.05–1.2)
MONOCYTES NFR BLD: 8 % (ref 3–10)
NEUTS SEG # BLD: 5.1 K/UL (ref 1.8–8)
NEUTS SEG NFR BLD: 63 % (ref 40–73)
NRBC # BLD: 0 K/UL (ref 0–0.01)
NRBC BLD-RTO: 0 PER 100 WBC
PLATELET # BLD AUTO: 229 K/UL (ref 135–420)
PMV BLD AUTO: 10.2 FL (ref 9.2–11.8)
POTASSIUM SERPL-SCNC: 5 MMOL/L (ref 3.5–5.5)
PROT SERPL-MCNC: 7.2 G/DL (ref 6.4–8.2)
RBC # BLD AUTO: 3.83 M/UL (ref 4.35–5.65)
SODIUM SERPL-SCNC: 141 MMOL/L (ref 136–145)
VIT B12 SERPL-MCNC: 1202 PG/ML (ref 211–911)
WBC # BLD AUTO: 8.1 K/UL (ref 4.6–13.2)

## 2022-12-02 PROCEDURE — 36415 COLL VENOUS BLD VENIPUNCTURE: CPT

## 2022-12-02 PROCEDURE — 82607 VITAMIN B-12: CPT

## 2022-12-02 PROCEDURE — 80053 COMPREHEN METABOLIC PANEL: CPT

## 2022-12-02 PROCEDURE — 85025 COMPLETE CBC W/AUTO DIFF WBC: CPT

## 2022-12-02 RX ORDER — ATORVASTATIN CALCIUM 10 MG/1
10 TABLET, FILM COATED ORAL DAILY
Qty: 90 TABLET | Refills: 3 | Status: SHIPPED | OUTPATIENT
Start: 2022-12-02

## 2022-12-02 NOTE — PATIENT INSTRUCTIONS
Medicare Wellness Visit, Male    The best way to live healthy is to have a lifestyle where you eat a well-balanced diet, exercise regularly, limit alcohol use, and quit all forms of tobacco/nicotine, if applicable. Regular preventive services are another way to keep healthy. Preventive services (vaccines, screening tests, monitoring & exams) can help personalize your care plan, which helps you manage your own care. Screening tests can find health problems at the earliest stages, when they are easiest to treat. [facility] follows the current, evidence-based guidelines published by the [facility] Dorinda ([facility]) when recommending preventive services for our patients. Because we follow these guidelines, sometimes recommendations change over time as research supports it. (For example, a prostate screening blood test is no longer routinely recommended for men with no symptoms). Of course, you and your doctor may decide to screen more often for some diseases, based on your risk and co-morbidities (chronic disease you are already diagnosed with). Preventive services for you include:  - Medicare offers their members a free annual wellness visit, which is time for you and your primary care provider to discuss and plan for your preventive service needs. Take advantage of this benefit every year!  -All adults over age 72 should receive the recommended pneumonia vaccines. Current USPSTF guidelines recommend a series of two vaccines for the best pneumonia protection.   -All adults should have a flu vaccine yearly and tetanus vaccine every 10 years.  -All adults age 48 and older should receive the shingles vaccines (series of two vaccines).        -All adults age 38-68 who are overweight should have a diabetes screening test once every three years.   -Other screening tests & preventive services for persons with diabetes include: an eye exam to screen for diabetic retinopathy, a kidney function test, a foot exam, and stricter control over your cholesterol.   -Cardiovascular screening for adults with routine risk involves an electrocardiogram (ECG) at intervals determined by the provider.   -Colorectal cancer screening should be done for adults age 54-65 with no increased risk factors for colorectal cancer. There are a number of acceptable methods of screening for this type of cancer. Each test has its own benefits and drawbacks. Discuss with your provider what is most appropriate for you during your annual wellness visit. The different tests include: colonoscopy (considered the best screening method), a fecal occult blood test, a fecal DNA test, and sigmoidoscopy.  -All adults born between [facility] should be screened once for Hepatitis C.  -An Abdominal Aortic Aneurysm (AAA) Screening is recommended for men age 73-68 who has ever smoked in their lifetime. Here is a list of your current Health Maintenance items (your personalized list of preventive services) with a due date:  Health Maintenance Due   Topic Date Due    COVID-19 Vaccine (4 - Booster for Selvz series) 02/16/2022    Yearly Flu Vaccine (1) 08/01/2022     Please take half tablet of Hydralazine @ noon, and continue taking one tablet twice daily, and continue to monitor your BP, call us if 140/90 or more.

## 2022-12-02 NOTE — PROGRESS NOTES
John Lester is a 81 y.o. male (: 1940) presenting to address:    No chief complaint on file. Vitals:    22 1309   BP: (!) 173/85   Pulse: (!) 59   Resp: 18   Temp: 98.2 °F (36.8 °C)   TempSrc: Temporal   SpO2: 93%   Weight: 197 lb (89.4 kg)   Height: 5' 8\" (1.727 m)       Hearing/Vision:     Vision Screening    Right eye Left eye Both eyes   Without correction      With correction 20/30 20/50 20/30       Learning Assessment:     Learning Assessment 2017   PRIMARY LEARNER Patient   HIGHEST LEVEL OF EDUCATION - PRIMARY LEARNER  4 YEARS OF COLLEGE   BARRIERS PRIMARY LEARNER NONE   CO-LEARNER CAREGIVER No   PRIMARY LANGUAGE ENGLISH    NEED No   LEARNER PREFERENCE PRIMARY VIDEOS   ANSWERED BY self   RELATIONSHIP SELF     Depression Screening:     3 most recent PHQ Screens 2022   Little interest or pleasure in doing things Not at all   Feeling down, depressed, irritable, or hopeless Not at all   Total Score PHQ 2 0     Fall Risk Assessment:     Fall Risk Assessment, last 12 mths 2022   Able to walk? Yes   Fall in past 12 months? 0   Do you feel unsteady? -   Are you worried about falling -   Number of falls in past 12 months -   Fall with injury? -     Abuse Screening:     Abuse Screening Questionnaire 2022   Do you ever feel afraid of your partner? N   Are you in a relationship with someone who physically or mentally threatens you? N   Is it safe for you to go home?  Y     ADL Assessment:     ADL Assessment 2022   Feeding yourself No Help Needed   Getting from bed to chair No Help Needed   Getting dressed No Help Needed   Bathing or showering No Help Needed   Walk across the room (includes cane/walker) No Help Needed   Using the telphone No Help Needed   Taking your medications No Help Needed   Preparing meals No Help Needed   Managing money (expenses/bills) No Help Needed   Moderately strenuous housework (laundry) No Help Needed   Shopping for personal items (toiletries/medicines) No Help Needed   Shopping for groceries No Help Needed   Driving No Help Needed   Climbing a flight of stairs No Help Needed   Getting to places beyond walking distances No Help Needed        Coordination of Care Questionaire:   1. \"Have you been to the ER, urgent care clinic since your last visit? Hospitalized since your last visit? \" No    2. \"Have you seen or consulted any other health care providers outside of the [address] [place] [address] [facility] since your last visit? \"  Nephrology      3. For patients aged 39-70: Has the patient had a colonoscopy? no  If the patient is female:    4. For patients aged 41-77: Has the patient had a mammogram within the past 2 years? NA - based on age    11. For patients aged 21-65: Has the patient had a pap smear? NA - based on age    Advanced Directive:   1. Do you have an Advanced Directive? YES    2. Would you like information on Advanced Directives? NO    Flu Immunization/s administered 12/2/2022 by Ashok Pereira with patient's consent. Patient tolerated procedure well. No reactions noted.

## 2022-12-02 NOTE — PROGRESS NOTES
This is the Subsequent Medicare Annual Wellness Exam, performed 12 months or more after the Initial AWV or the last Subsequent AWV    I have reviewed the patient's medical history in detail and updated the computerized patient record. Assessment/Plan   Education and counseling provided:  Are appropriate based on today's review and evaluation  End-of-Life planning (with patient's consent), AMD on file, his wife is the health care proxy  Advised pt to get his covid vaccine booster soon    1. Medicare annual wellness visit, subsequent  2. Encounter for immunization  -     INFLUENZA, FLUAD, (AGE 65 Y+), IM, PF, 0.5 ML  3. B12 deficiency, stable  -     CBC WITH AUTOMATED DIFF; Future  4. Mixed hyperlipidemia, stable  -     atorvastatin (LIPITOR) 10 mg tablet; Take 1 Tablet by mouth daily. , Normal, Disp-90 Tablet, R-3Requesting 1 year supply  -     METABOLIC PANEL, COMPREHENSIVE; Future  5. Essential hypertension  6. Diabetes mellitus with stage 3 chronic kidney disease (HCC)  -     AMB POC HEMOGLOBIN A1C  -     empagliflozin (Jardiance) 10 mg tablet; Take 1 Tablet by mouth every morning., Normal, Disp-90 Tablet, R-1       Depression Risk Factor Screening     3 most recent PHQ Screens 12/2/2022   Little interest or pleasure in doing things Not at all   Feeling down, depressed, irritable, or hopeless Not at all   Total Score PHQ 2 0       Alcohol & Drug Abuse Risk Screen   Do you average more than 1 drink per night or more than 7 drinks a week?: No  In the past three months have you had more than 4 drinks containing alcohol on one occasion?: No          Functional Ability and Level of Safety   Hearing:  Hearing: Patient reports hearing is good    Activities of Daily Living: The home contains: no safety equipment  Functional ADLs: Patient does total self care   Ambulation:  Patient ambulates: with no difficulty   Fall Risk:  Fall Risk Assessment, last 12 mths 12/2/2022   Able to walk?  Yes   Fall in past 12 months? 0 Do you feel unsteady? -   Are you worried about falling -   Number of falls in past 12 months -   Fall with injury? -     Abuse Screen:  Do you ever feel afraid of your partner?: No  Are you in a relationship with someone who physically or mentally threatens you?: No  Is it safe for you to go home?: Yes      Cognitive Screening   Has your family/caregiver stated any concerns about your memory?: No   Cognitive Screening: Aox3, no memory loss.     Health Maintenance Due     Health Maintenance Due   Topic Date Due    COVID-19 Vaccine (4 - Booster for Pfizer series) 02/16/2022       Patient Care Team   Patient Care Team:  Shahram Rodríguez MD as PCP - General (Internal Medicine Physician)  Shahram Rodríguez MD as PCP - [facility]  Margarita Silverman MD (Nephrology)  Huseyin Mays MD (Urology)  Simone Márquez NP (Nurse Practitioner)  Nel Hernandez MD (Ophthalmology)  Jose Riley MD (Surgery Physician)  Amber Edgar MD (Gastroenterology)  Huseyin Mays MD (Urology)    History     Patient Active Problem List   Diagnosis Code    CKD (chronic kidney disease) stage 3, GFR 30-59 ml/min ([facility]) N18.30    Essential hypertension I10    Elevated PSA R97.20    Benign prostatic hyperplasia with lower urinary tract symptoms N40.1    B12 deficiency E53.8    Diabetes mellitus with stage 3 chronic kidney disease ([address] E11.22, N18.30    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 cholecystitis K80.21    Adenomatous polyp of descending colon D12.4    Localized edema R60.0    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    Class 1 obesity due to excess calories with serious comorbidity and body mass index (BMI) of 32.0 to 32.9 in adult E66.09, Z68.32     Past Medical History:   Diagnosis Date    BPH with obstruction/lower urinary tract symptoms     Calculus of kidney     Chronic kidney disease     Diabetes ([facility]     Elevated PSA     Hypercholesterolemia     Hypertension       Past Surgical History:   Procedure Laterality Date    HX APPENDECTOMY      16years old     HX COLONOSCOPY  09/10/2014    HX ORTHOPAEDIC  1970s    r shoulder    HX TONSILLECTOMY      HX UROLOGICAL  02/06/2017    Prostate Bx: HGPIN left lateral mid. Dr. Reese Sanabria @ [facility]. Current Outpatient Medications   Medication Sig Dispense Refill    atorvastatin (LIPITOR) 10 mg tablet Take 1 Tablet by mouth daily. 90 Tablet 3    empagliflozin (Jardiance) 10 mg tablet Take 1 Tablet by mouth every morning. 90 Tablet 1    SITagliptin (Januvia) 50 mg tablet TAKE 1 TABLET BY MOUTH  DAILY 90 Tablet 3    nebivoloL (BYSTOLIC) 5 mg tablet TAKE 1 TABLET BY MOUTH  DAILY 90 Tablet 3    fluticasone propionate (FLONASE) 50 mcg/actuation nasal spray USE 2 SPRAYS IN BOTH  NOSTRILS DAILY 48 g 3    hydrALAZINE (APRESOLINE) 100 mg tablet Take 1 Tablet by mouth three (3) times daily. (Patient taking differently: Take 100 mg by mouth two (2) times a day.) 270 Tablet 3    tamsulosin (FLOMAX) 0.4 mg capsule TAKE 1 CAPSULE BY MOUTH  DAILY 90 Capsule 3    furosemide (LASIX) 40 mg tablet TAKE 1 TABLET BY MOUTH  DAILY 90 Tablet 3    glucose blood VI test strips (OneTouch Ultra Test) strip Test blood glucose bid. E11.22 300 Strip 3    triamcinolone acetonide (KENALOG) 0.1 % topical cream Apply  to affected area two (2) times daily as needed for Skin Irritation or Itching. 454 g 3    albuterol (PROVENTIL VENTOLIN) 2.5 mg /3 mL (0.083 %) nebulizer solution 3 mL by Nebulization route every four (4) hours as needed for Wheezing. 24 Each 1    albuterol (PROVENTIL HFA, VENTOLIN HFA, PROAIR HFA) 90 mcg/actuation inhaler Take 2 Puffs by inhalation every four (4) hours as needed for Wheezing. 1 Inhaler 11    cyanocobalamin 1,000 mcg tablet Take 5,000 mcg by mouth. One tablet on Wednesday. cholecalciferol (VITAMIN D3) 25 mcg (1,000 unit) cap Take  by mouth daily. vitamin E (AQUA GEMS) 400 unit capsule Take  by mouth daily.       aspirin 81 mg chewable tablet Take 81 mg by mouth daily. cranberry extract 450 mg tab Take  by mouth. ascorbic acid, vitamin C, (VITAMIN C) 500 mg tablet Take 1,000 mg by mouth.       OTHER Indications: Osteo Bi-Flex 1 per day      glipiZIDE SR (GLUCOTROL XL) 2.5 mg CR tablet TAKE 1 TABLET BY MOUTH IN  THE MORNING 90 Tablet 3     Allergies   Allergen Reactions    Norvasc [Amlodipine] Swelling     Patient states NKDA       Family History   Problem Relation Age of Onset    Kidney Disease Mother     Stroke Father      Social History     Tobacco Use    Smoking status: Never    Smokeless tobacco: Never   Substance Use Topics    Alcohol use: No         Pratibha Basilio MD

## 2022-12-03 NOTE — PROGRESS NOTES
Vit b12 is ok, continue vit b12 weekly  Hgb is stable  Creatinine is worse than last labs, please fax copy to Nephrology, Dr Mary Jo Messina  Glucose 193, please continue meds/diet as discussed

## 2022-12-03 NOTE — PROGRESS NOTES
HISTORY OF PRESENT ILLNESS  Blanche Fenton is a 81 y.o. male. HPI  HLD, stable on lipitor daily  Vit B12 def, stable on vit B12 weekly  HTN, not well controlled, he is taking his bystolic/lasix daily, he is taking hydralazine only BID instead of TID, stated that his BP is low sometimes @ home monitor  DM, with stage 3b CKD, worsening, his glucose is around 200 @ lunch time, his A1c is high today @ 9.0, he is taking Januvia and glucotrol daily. Review of Systems   Constitutional:  Negative for fever. Respiratory:  Negative for cough and shortness of breath. Cardiovascular:  Negative for chest pain, palpitations and leg swelling. Gastrointestinal:  Negative for abdominal pain and nausea. Neurological:  Negative for dizziness and headaches. Physical Exam  Vitals reviewed. Cardiovascular:      Rate and Rhythm: Normal rate and regular rhythm. Heart sounds: Normal heart sounds. No murmur heard. Pulmonary:      Effort: Pulmonary effort is normal.      Breath sounds: Normal breath sounds. Musculoskeletal:      Right lower leg: No edema. Left lower leg: No edema. Neurological:      Mental Status: He is oriented to person, place, and time. ASSESSMENT and PLAN  Diagnoses and all orders for this visit:    1. Medicare annual wellness visit, subsequent    2. Encounter for immunization  -     INFLUENZA, FLUAD, (AGE 65 Y+), IM, PF, 0.5 ML    3. B12 deficiency, stable  -     CBC WITH AUTOMATED DIFF; Future  - Vit B12 and Folate. 4. Mixed hyperlipidemia, stable  -     atorvastatin (LIPITOR) 10 mg tablet; Take 1 Tablet by mouth daily.  -     METABOLIC PANEL, COMPREHENSIVE; Future    5. Essential hypertension, not controlled, continue bystolic and lasix, advised to take additional hydralazine 50 mg @ noon, continue 100 mg po BID, continue to monitor BP    6.  Diabetes mellitus with stage 3 chronic kidney disease ([facility] [address] worsening, continue glipizide and januvia, and will add Jardiance  -     AMB POC HEMOGLOBIN A1C  -     empagliflozin (Jardiance) 10 mg tablet; Take 1 Tablet by mouth every morning. Results for orders placed or performed in visit on 12/02/22   AMB POC HEMOGLOBIN A1C   Result Value Ref Range    Hemoglobin A1c (POC) 9.0 %     Lab Results   Component Value Date/Time    Cholesterol, total 137 08/29/2022 11:49 AM    HDL Cholesterol 45 08/29/2022 11:49 AM    LDL-C, External 60 05/19/2021 12:00 AM    LDL, calculated 76 08/29/2022 11:49 AM    VLDL, calculated 16 08/29/2022 11:49 AM    Triglyceride 82 08/29/2022 11:49 AM    CHOL/HDL Ratio 2.4 05/18/2022 08:48 AM       Follow-up and Dispositions    Return in about 1 month (around 1/5/2023).

## 2022-12-12 ENCOUNTER — PATIENT MESSAGE (OUTPATIENT)
Dept: FAMILY MEDICINE CLINIC | Age: 82
End: 2022-12-12

## 2022-12-12 NOTE — TELEPHONE ENCOUNTER
Spoke and informed Dr. Molly Dominguez of symptoms. Provider advised for pt to go to ER to be evaluated. Spoke with pt's wife who voiced understanding and stated will be taking pt to [facility].

## 2022-12-12 NOTE — TELEPHONE ENCOUNTER
----- Message from Maria Victoria Carlisle sent at 12/12/2022  9:50 AM EST -----  Regarding: Episode this morning  We called EMS this morning. Philomena Kehr has been complaining of feeling \"Off\" the last couple of days and low energy. He has been complaining of being dizzy, \"drunken \" without the drinking. Not typical. This morning he had taken a hot shower and was in the process of dressing. He was sitting in a chair, when he went unresponsive, made gagging sounds, tongue went to one side, eyes rolled. It lasted  maybe 10 seconds. When he came to, he was cognitive, but exhausted. His blood pressure was 124/68. EMS checked him out and did not feel he had a mini stroke or a seizure, but something to do with his Rees (sp?) vein response causing a drop of BP. His blood sugar was 320 with no morning meds or food. The recommendation was to notify you. It was scary to me, but  he seems ok, though worn out. He has taken his meds now. Would this have to do with his blood sugar?   I may be reached at [phone] [facility]

## 2023-01-05 ENCOUNTER — OFFICE VISIT (OUTPATIENT)
Dept: FAMILY MEDICINE CLINIC | Age: 83
End: 2023-01-05
Payer: MEDICARE

## 2023-01-05 VITALS
WEIGHT: 189.6 LBS | TEMPERATURE: 98.4 F | HEIGHT: 68 IN | BODY MASS INDEX: 28.73 KG/M2 | SYSTOLIC BLOOD PRESSURE: 130 MMHG | HEART RATE: 50 BPM | RESPIRATION RATE: 18 BRPM | OXYGEN SATURATION: 95 % | DIASTOLIC BLOOD PRESSURE: 66 MMHG

## 2023-01-05 DIAGNOSIS — L73.9 FOLLICULITIS: ICD-10-CM

## 2023-01-05 DIAGNOSIS — N18.30 DIABETES MELLITUS WITH STAGE 3 CHRONIC KIDNEY DISEASE (HCC): ICD-10-CM

## 2023-01-05 DIAGNOSIS — E11.22 DIABETES MELLITUS WITH STAGE 3 CHRONIC KIDNEY DISEASE (HCC): ICD-10-CM

## 2023-01-05 DIAGNOSIS — I10 ESSENTIAL HYPERTENSION: Primary | ICD-10-CM

## 2023-01-05 LAB — GLUCOSE POC: 218 MG/DL

## 2023-01-05 PROCEDURE — 99214 OFFICE O/P EST MOD 30 MIN: CPT | Performed by: INTERNAL MEDICINE

## 2023-01-05 PROCEDURE — 3078F DIAST BP <80 MM HG: CPT | Performed by: INTERNAL MEDICINE

## 2023-01-05 PROCEDURE — 3075F SYST BP GE 130 - 139MM HG: CPT | Performed by: INTERNAL MEDICINE

## 2023-01-05 PROCEDURE — G8536 NO DOC ELDER MAL SCRN: HCPCS | Performed by: INTERNAL MEDICINE

## 2023-01-05 PROCEDURE — 82962 GLUCOSE BLOOD TEST: CPT | Performed by: INTERNAL MEDICINE

## 2023-01-05 PROCEDURE — 1123F ACP DISCUSS/DSCN MKR DOCD: CPT | Performed by: INTERNAL MEDICINE

## 2023-01-05 PROCEDURE — G8427 DOCREV CUR MEDS BY ELIG CLIN: HCPCS | Performed by: INTERNAL MEDICINE

## 2023-01-05 PROCEDURE — G8417 CALC BMI ABV UP PARAM F/U: HCPCS | Performed by: INTERNAL MEDICINE

## 2023-01-05 PROCEDURE — 1101F PT FALLS ASSESS-DOCD LE1/YR: CPT | Performed by: INTERNAL MEDICINE

## 2023-01-05 PROCEDURE — G8510 SCR DEP NEG, NO PLAN REQD: HCPCS | Performed by: INTERNAL MEDICINE

## 2023-01-05 RX ORDER — SEMAGLUTIDE 1.34 MG/ML
INJECTION, SOLUTION SUBCUTANEOUS
Qty: 3 PEN | Refills: 1 | Status: SHIPPED | OUTPATIENT
Start: 2023-01-05

## 2023-01-05 RX ORDER — CEPHALEXIN 500 MG/1
500 CAPSULE ORAL 3 TIMES DAILY
Qty: 21 CAPSULE | Refills: 0 | Status: SHIPPED | OUTPATIENT
Start: 2023-01-05 | End: 2023-01-12

## 2023-01-05 NOTE — PROGRESS NOTES
Herbert Barrientos is a 81 y.o. male (: 1940) presenting to address:    Chief Complaint   Patient presents with    Medication Evaluation       Vitals:    23 1013   BP: (!) 161/77   Pulse: (!) 50   Resp: 18   Temp: 98.4 °F (36.9 °C)   TempSrc: Temporal   SpO2: 95%   Weight: 189 lb 9.6 oz (86 kg)   Height: 5' 8\" (1.727 m)   PainSc:   0 - No pain       Hearing/Vision:   No results found. Learning Assessment:     Learning Assessment 2017   PRIMARY LEARNER Patient   HIGHEST LEVEL OF EDUCATION - PRIMARY LEARNER  4 YEARS OF COLLEGE   BARRIERS PRIMARY LEARNER NONE   CO-LEARNER CAREGIVER No   PRIMARY LANGUAGE ENGLISH    NEED No   LEARNER PREFERENCE PRIMARY VIDEOS   ANSWERED BY self   RELATIONSHIP SELF     Depression Screening:     3 most recent PHQ Screens 2023   Little interest or pleasure in doing things Not at all   Feeling down, depressed, irritable, or hopeless Not at all   Total Score PHQ 2 0     Fall Risk Assessment:     Fall Risk Assessment, last 12 mths 2023   Able to walk? Yes   Fall in past 12 months? 0   Do you feel unsteady? 0   Are you worried about falling 0   Number of falls in past 12 months -   Fall with injury? -     Abuse Screening:     Abuse Screening Questionnaire 2023   Do you ever feel afraid of your partner? N   Are you in a relationship with someone who physically or mentally threatens you? N   Is it safe for you to go home?  Y     ADL Assessment:     ADL Assessment 2022   Feeding yourself No Help Needed   Getting from bed to chair No Help Needed   Getting dressed No Help Needed   Bathing or showering No Help Needed   Walk across the room (includes cane/walker) No Help Needed   Using the telphone No Help Needed   Taking your medications No Help Needed   Preparing meals No Help Needed   Managing money (expenses/bills) No Help Needed   Moderately strenuous housework (laundry) No Help Needed   Shopping for personal items (toiletries/medicines) No Help Needed Shopping for groceries No Help Needed   Driving No Help Needed   Climbing a flight of stairs No Help Needed   Getting to places beyond walking distances No Help Needed        Coordination of Care Questionaire:   1. \"Have you been to the ER, urgent care clinic since your last visit? Hospitalized since your last visit? \" No    2. \"Have you seen or consulted any other health care providers outside of the [address] [place] [address] [facility] since your last visit? \" Yes Where: [facility]      3. For patients aged 39-70: Has the patient had a colonoscopy? NA - based on age     Advanced Directive:   1. Do you have an Advanced Directive? YES    2. Would you like information on Advanced Directives?  NO

## 2023-01-05 NOTE — PROGRESS NOTES
HISTORY OF PRESENT ILLNESS  Chase Brady is a 81 y.o. male. HPI  HTN, improved, we increased his hydralazine dose last visit, he is also on bystolic and lasix daily  DM with CKD, not well controlled, his glucose is 218 this morning, his glucose is around 180 at home. C/o bump with yellow d/c on the right buttock area, his wife has been changing the dressing daily and still having discharge. Review of Systems   Constitutional:  Negative for fever. Respiratory:  Negative for cough and shortness of breath. Cardiovascular:  Negative for chest pain and leg swelling. Physical Exam  Vitals reviewed. Cardiovascular:      Rate and Rhythm: Normal rate and regular rhythm. Heart sounds: Normal heart sounds. No murmur heard. Pulmonary:      Effort: Pulmonary effort is normal.      Breath sounds: Normal breath sounds. Musculoskeletal:      Right lower leg: No edema. Left lower leg: No edema. Skin:     Findings: Erythema (erythematous area with slight purulent discharge on the right buttocks.) present. Neurological:      Mental Status: He is oriented to person, place, and time. ASSESSMENT and PLAN  Diagnoses and all orders for this visit:    1. Essential hypertension, controlled, continue Hydralazine/bystolic/lasix @ current dose    2. Diabetes mellitus with stage 3 chronic kidney disease ([facility] [address] not well controlled, will add Ozempic, continue jardiance, d/c [facility] [place]. -     AMB POC GLUCOSE BLOOD, BY GLUCOSE MONITORING DEVICE  -     semaglutide (Ozempic) 0.25 mg or 0.5 mg/dose (2 mg/1.5 ml) subq pen; Inject 0.25 mg subcutaneously once every week for 4 weeks, then increase the dose to 0.5 mg subcutaneously every week. 3. Folliculitis  -     cephALEXin (KEFLEX) 500 mg capsule; Take 1 Capsule by mouth three (3) times daily for 7 days.       Results for orders placed or performed in visit on 01/05/23   AMB POC GLUCOSE BLOOD, BY GLUCOSE MONITORING DEVICE   Result Value Ref Range    Glucose  MG/DL     Lab Results   Component Value Date/Time    Hemoglobin A1c 7.9 (H) 08/29/2022 11:49 AM    Hemoglobin A1c (POC) 9.0 12/02/2022 01:26 PM    Hemoglobin A1c, External 6.5 05/19/2021 12:00 AM       Follow-up and Dispositions    Return in about 2 months (around 3/6/2023).

## 2023-01-11 LAB — HBA1C MFR BLD HPLC: 8.5 %

## 2023-01-13 ENCOUNTER — PATIENT OUTREACH (OUTPATIENT)
Dept: CASE MANAGEMENT | Age: 83
End: 2023-01-13

## 2023-01-13 NOTE — PROGRESS NOTES
Care Transitions Initial Note    Call within 2 business days of discharge: No, Discharged to [facility]    Patient: Mars Theodore Patient : 1940 MRN: [id number]    Patient was admitted to [facility] from 23 to 23 for syncope and acute traumatic R posterior medical tibial plateau fracture. Syncope 2/2 postural dizziness/hypotension from Lasix/increased hydralazine compounded by dehydration from osmotic diuresis d/t poorly controlled DM. Patient was discharged to [facility]). Was this an external facility discharge? Yes, 2023  Discharge Facility: [facility] [place]          Transition of care outreach postponed for 14 days due to patient's discharge to [facility].

## 2023-01-14 DIAGNOSIS — N18.32 TYPE 2 DIABETES MELLITUS WITH STAGE 3B CHRONIC KIDNEY DISEASE, WITH LONG-TERM CURRENT USE OF INSULIN (HCC): ICD-10-CM

## 2023-01-14 DIAGNOSIS — L30.9 ECZEMA, UNSPECIFIED TYPE: ICD-10-CM

## 2023-01-14 DIAGNOSIS — E11.22 TYPE 2 DIABETES MELLITUS WITH STAGE 3B CHRONIC KIDNEY DISEASE, WITH LONG-TERM CURRENT USE OF INSULIN (HCC): ICD-10-CM

## 2023-01-14 DIAGNOSIS — Z79.4 TYPE 2 DIABETES MELLITUS WITH STAGE 3B CHRONIC KIDNEY DISEASE, WITH LONG-TERM CURRENT USE OF INSULIN (HCC): ICD-10-CM

## 2023-01-15 RX ORDER — TRIAMCINOLONE ACETONIDE 1 MG/G
CREAM TOPICAL
Qty: 454 G | Refills: 3 | Status: SHIPPED | OUTPATIENT
Start: 2023-01-15

## 2023-01-15 RX ORDER — BLOOD SUGAR DIAGNOSTIC
STRIP MISCELLANEOUS
Qty: 200 STRIP | Refills: 3 | Status: SHIPPED | OUTPATIENT
Start: 2023-01-15

## 2023-01-20 ENCOUNTER — HOME HEALTH ADMISSION (OUTPATIENT)
Dept: HOME HEALTH SERVICES | Facility: HOME HEALTH | Age: 83
End: 2023-01-20
Payer: MEDICARE

## 2023-01-22 ENCOUNTER — HOME CARE VISIT (OUTPATIENT)
Dept: SCHEDULING | Facility: HOME HEALTH | Age: 83
End: 2023-01-22
Payer: MEDICARE

## 2023-01-22 VITALS
HEART RATE: 78 BPM | TEMPERATURE: 97.5 F | RESPIRATION RATE: 18 BRPM | OXYGEN SATURATION: 97 % | DIASTOLIC BLOOD PRESSURE: 80 MMHG | SYSTOLIC BLOOD PRESSURE: 120 MMHG

## 2023-01-22 PROCEDURE — G0151 HHCP-SERV OF PT,EA 15 MIN: HCPCS

## 2023-01-22 NOTE — HOME HEALTH
Skilled services/Home bound verification:     Skilled Reason for admission/summary of clinical condition:  Mel Hernadez is a 81 y.o. male. HPI    HTN, improved, we increased his hydralazine dose last visit, he is also on bystolic and lasix daily    DM with CKD, not well controlled, his glucose is 218 this morning, his glucose is around 180 at home. C/o bump with yellow d/c on the right buttock area, his wife has been changing the dressing daily and still having discharge. Review of Systems    Constitutional:  Negative for fever. Respiratory:  Negative for cough and shortness of breath. Cardiovascular:  Negative for chest pain and leg swelling. .  This patient is homebound for the following reasons Requires considerable and taxing effort to leave the home . Caregiver: spouse. Caregiver assists with IADL's. Medications reconciled and all medications are not available in the home this visit.    Patient did not have lipitor during admit     High risk medication teaching regarding anticoagulants, hyperglycemic agents or opiod narcotics performed (specify) Januvia, glipizide for risk of hypoglycemia     Mello Pham MD notified of any discrepancies/look a like medications/medication interactions no severe interaction noted     Home health supplies by type and quantity ordered/delivered this visit include: none     Patient education provided this visit to include: HEP, fall prevention, dc planning     Patient level of understanding of education provided: Patient was able to partially teach back HEP     Sharps Education Provided: Clinician instructed patient/CG on proper disposal of sharps: Containers should be made of hard plastic, be puncture-resistant and leakproof,   such as a laundry detergent or bleach bottle.  When the container is ¾ full, it should be sealed with tape and labeled   DO NOT RECYCLE prior to discarding in the regular trash.      Patient response to procedure performed: Patient needed verbal cues for HEP     Home exercise program/Homework provided: patient educated with HEP including seated hip flex, knee extension, hip abduction, hip adduction, ankle PF and DF and ball squeeze x 20 reps x 3 sets daily to improve MMT on BLE to facilitate with improved bed mobility, transfers and gait with AD. patient also educated with deep breathing exercises to be done daily x 10 reps x 3 sets to prevent SOB during activity. Patient educated with fall prevention technique by decluttering space, proper use of AD and footwear    Pt/Caregiver instructed on plan of care and are agreeable to plan of care at this time. Physician Negin Arevalo MD notified of patient admission to home health and plan of care including anticipated frequency of 3w1 2w3 for PT     Discharge planning discussed with patient and caregiver. Discharge planning as follows: dc to family with MD supervision when all goals are met . Pt/Caregiver did verbalize understanding of discharge planning. Next MD appointment TBD. Patient/caregiver encouraged/instructed to keep appointment as lack of follow through with physician appointment could result in discontinuation of home care services for non-compliance.     PMHx: Essential hypertension      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 cholecystitis      Adenomat ous polyp of descending colon      Diabetes mellitus with stage 3 chronic kidney disease (HCC)      Benign prostatic hyperplasia with lower urinary tract symptoms      CKD (chronic kidney disease) stage 3, GFR 30-59 ml/min      RTA (renal tubular acidosis)      Elevated PSA      B12 deficiency      Localized edema      Class 1 obesity due to excess calories with serious comorbidity and body mass index (BMI) of 32.0 to 32.9 in adult   S: pain on RLE 2/10 that is managed by rest and ice  O:Patients Goals= Be able to go back to PLOF  Wound/Incision: location, description, drainage: no surgical intervention done   PLOF: Lives with spouse and daughter in a 1 level house with >5 steps to enter main floor from garage, prior to fall, he was independent with ADLs and IADL's and did not use any AD for gait and was able to drive   STRENGTH R hip flexor, extensor, hip abductors adductors 3-/5, R knee not assessed due to presence of knee immobilizer, LLE 4/5  BALANCE Tinetti 11/28 high fall risk due to reduced strength on BLE, gait deviation and decreased efficiency of balance reactions. Patient demonstrated poor use of hip and ankle strategy during standing balance activity. Patient requires support from AD at all times for safety and stability. GAIT Patient was able to ambulate with RW x 30 feet with Min A x 1 with noted antalgic gait on RLE, decreased step height and stride length on LLE with wide DEMETRIA and slow paced, Patient was educated with importance of using AD at this time to prevent LOB and falls   BED MOBILITY Patient needed Min A x1 with bed mobility   TRANSFERS Patient needed Min A x1 with use of RW to and from bed, chair and toilet   A: PT evaluation completed with the presence of spouse who is the primary CG, POC established, Med rec done, HEP established  P:Home Safety eval/recommendations: Home health physical therapy initial evaluation performed. Patient demonstrates decreased strength, balance, and endurance which increases patient's overall fall risk and burden of care. Patient would benefit from home health physical therapy to improve balance, strength, and endurance which would decrease fall risk and allow patient to return to prior level of function once all functional goals or full rehab potential is met. patient educated with HEP including seated hip flex, knee extension, hip abduction, hip adduction, ankle PF and DF and ball squeeze x 20 reps x 3 sets daily to improve MMT on BLE to facilitate with improved bed mobility, transfers and gait with AD. patient also educated with deep breathing exercises to be done daily x 10 reps x 3 sets to prevent SOB during activity.  Patient educated with fall prevention technique by decluttering space, proper use of AD and footwear

## 2023-01-22 NOTE — Clinical Note
tibia closed fx (R) has knee immobilizer.  He did not have Lipitor during admit wife said they must have misplaced it when they went to the hospital

## 2023-01-24 ENCOUNTER — TELEPHONE (OUTPATIENT)
Dept: FAMILY MEDICINE CLINIC | Age: 83
End: 2023-01-24

## 2023-01-24 NOTE — TELEPHONE ENCOUNTER
Patient's wife called to see if she can have a refill of the ABX that Dr. Praful Leonardo prescribed. Patient fell and was taken off the ABX while in the hospital. Patient was D/C from [facility] Saturday the 21st and said the folliculitis is worse now. LPN advised to make an apt to be seen if issue is worse. Patient said she will reach out to the dermatologist to see if that office might be able to help.

## 2023-01-25 ENCOUNTER — HOME CARE VISIT (OUTPATIENT)
Dept: SCHEDULING | Facility: HOME HEALTH | Age: 83
End: 2023-01-25
Payer: MEDICARE

## 2023-01-25 ENCOUNTER — HOME CARE VISIT (OUTPATIENT)
Dept: HOME HEALTH SERVICES | Facility: HOME HEALTH | Age: 83
End: 2023-01-25
Payer: MEDICARE

## 2023-01-25 VITALS
RESPIRATION RATE: 16 BRPM | TEMPERATURE: 98 F | DIASTOLIC BLOOD PRESSURE: 70 MMHG | OXYGEN SATURATION: 98 % | HEART RATE: 58 BPM | SYSTOLIC BLOOD PRESSURE: 140 MMHG

## 2023-01-25 PROCEDURE — G0299 HHS/HOSPICE OF RN EA 15 MIN: HCPCS

## 2023-01-25 PROCEDURE — G0157 HHC PT ASSISTANT EA 15: HCPCS

## 2023-01-25 NOTE — Clinical Note
nursing eval completed and nursing eval only.  declined any further nursing visits as reported that only needs PT. thank you

## 2023-01-26 ENCOUNTER — PATIENT OUTREACH (OUTPATIENT)
Dept: CASE MANAGEMENT | Age: 83
End: 2023-01-26

## 2023-01-26 NOTE — HOME HEALTH
Skilled reason for visit: nursing eval for Displaced Fracture of Medial Condyle Of Right Tibia, Closed Fracture     Caregiver involvement: family assist with transportation and meal prep. .    Medications reviewed and all medications are available in the home this visit. The following education was provided regarding medications:   reviewed all medication bottles in the home for purpose, side effects and frequency. verbalized understanding      MD notified of any discrepancies/look a-like medications/medication interactions: moderate interactions noted: furosemide and aspirin: The pharmacological effects of loop diuretics may be decreased reducing their antihypertensive and diuretic actions. verbalized understanding    Medications are effective at this time. Home health supplies by type and quantity ordered/delivered this visit include: N/A    Patient education provided this visit:REVIEWED DM. REVIEWED HYPO/HYPERGLYCEMIA. HYPOGLYCEMIA (BS LESS THAN 70): SWEATING, JITTERY, NERVOUS, ANXIETY, HUNGER, CONFUSION, OR FATIGUE. EAT 15 GRAM OF CARBOHYDRATES AND RECHECK BS EVERY 15 MIN. IF BS DOESN'T INCREASE SEEK MEDICAL TREATMENT IMMEDIATELY. HYPERGLYCEMIA (BS GREATER THAN 200): HEADACHE, FREQUENT URINATION, BLURRED VISION, FRUITY SMELLING URINE, DRY MOUTH,FATIGUE, OR CONFUSION. reviewed HTN: to notify home health/provider for: Feeling faint, dizzy, confused, or drowsy; continually high blood pressure readings despite taking medications; questions or concerns about condition or care. Notify emergency services for signs of a heart attack: Squeezing, pressure, or pain in chest; discomfort or pain in back, neck, jaw, stomach, or arm; shortness of breath; nausea or vomiting; lightheadedness or a sudden cold sweat. Notify emergency services for signs of a stroke: Numbness or drooping on one side of face, weakness in an arm or leg, confusion or difficulty speaking, dizziniess, severe headache, or vision loss. Reviewed fall precautions and safety:dangle, uses assistive device at all times, non skid foot wear,and night light. Verbalized understanding. advised to call medical provider for non-life-threathing concerns before going to ER/urgent care. verbalized understanding and repeat back          Sharps education provided: Clinician instructed patient/CG on proper disposal of sharps: Containers should be made of hard plastic, be puncture-resistant and leakproof,   such as a laundry detergent or bleach bottle.  When the container is ¾ full, it should be sealed with tape and labeled   DO NOT RECYCLE prior to discarding in the regular trash.  verbalized understanding and repeat back      Patient level of understanding of education provided: verbalized understanding to all education. Skilled Care Performed this visit: skin is dry and intact. Patient response to procedure performed:  N/A    NURSING EVAL ONLY. REPORTED DONT NEED ANY FURTHER NURSING VISIT.    APPT DR Seo Rater 3/9/2023

## 2023-01-26 NOTE — PROGRESS NOTES
Care Transitions Initial Call    Call within 2 business days of discharge: No, CTN was unaware of discharge from [facility] until today. Care Transitions Nurse (CTN) attempted to contact the patient and spouse Dwight Adam, on PHI release, by telephone to perform post hospital discharge assessment. Unable to reach. Left [facility] requesting a return call. Patient: Leslie Long Patient : 1940 MRN: [id number]    Last Discharge [address]       Date Complaint Diagnosis Description Type Department Provider    22 Positive For Covid-19 COVID-19 ED (DISCHARGE) Amalia Hodges MD          Patient was admitted to [facility] from 23 to 23 for syncope and acute traumatic R posterior medical tibial plateau fracture. Syncope 2/2 postural dizziness/hypotension from Lasix/increased hydralazine compounded by dehydration from osmotic diuresis d/t poorly controlled DM. Patient was admitted to [facility] ([facility]) from 23 to 23. Was this an external facility discharge? Yes, 2023  Discharge Facility: [facility]      COVID-19 related testing was available at this time. Test results were negative. Advance Care Planning:   Does patient have an Advance Directive: yes, reviewed and current. Inpatient Readmission Risk score: No data recorded  Was this a readmission? no       Patients top risk factors for readmission: falls and medical condition-tibial fracture, poorly controlled DM         Home Health/Outpatient orders at discharge: home health care  [address]: [address]  Date of initial visit: 23    Durable Medical Equipment ordered at discharge:  Unknown      Was patient discharged with a pulse oximeter? N/a    If no appointment was previously scheduled, appointment scheduling offered:  n/a . Is follow up appointment scheduled within 7 days of discharge? Orthopedic appt was scheduled within 7 days.  PCP appt was scheduled within 7 days, but was canceled . [facility] follow up appointment(s):   Future Appointments   Date Time Provider Department Center   1/27/2023 To Be Determined Margarita Cecilio, PTA [address] [place] [address]   1/31/2023 To Be Determined Margarita Cecilio, PTA [address] [place] [address]   2/1/2023 To Be [place] [address] [place] [address]   2/3/2023 To Be Determined Margarita Cecilio, PTA [address] [place] [address]   2/7/2023 To Be Determined Margarita Cecilio, PTA [address] [place] [address]   2/10/2023 To Be Determined Margarita Cecilio, PTA [address] [place] [address]   2/14/2023 To Be Determined Margarita Cecilio, PTA [address] [place] [address]   2/17/2023 To Be Determined [place] Fiddler, PT [address]   3/9/2023  8:30 AM Wandy Villeda MD BSMA [facility]     Non-BS follow up appointment(s): Brandi Pineda TBD    Plan for follow-up call in 1-2 days based on severity of symptoms and risk factors. Plan for next call:  Complete initial MARTHA assessment. CTN provided contact information for future needs.

## 2023-01-27 ENCOUNTER — PATIENT OUTREACH (OUTPATIENT)
Dept: CASE MANAGEMENT | Age: 83
End: 2023-01-27

## 2023-01-27 ENCOUNTER — HOME CARE VISIT (OUTPATIENT)
Dept: SCHEDULING | Facility: HOME HEALTH | Age: 83
End: 2023-01-27
Payer: MEDICARE

## 2023-01-27 PROCEDURE — G0157 HHC PT ASSISTANT EA 15: HCPCS

## 2023-01-27 RX ORDER — DOXYCYCLINE 100 MG/1
CAPSULE ORAL
COMMUNITY
Start: 2023-01-25

## 2023-01-27 RX ORDER — MUPIROCIN 20 MG/G
OINTMENT TOPICAL
COMMUNITY
Start: 2023-01-25

## 2023-01-27 RX ORDER — LANCETS
1 EACH MISCELLANEOUS 3 TIMES DAILY
COMMUNITY

## 2023-01-27 RX ORDER — BLOOD SUGAR DIAGNOSTIC
STRIP MISCELLANEOUS
COMMUNITY
End: 2023-01-27 | Stop reason: SDUPTHER

## 2023-01-27 RX ORDER — MILK THISTLE 150 MG
500 CAPSULE ORAL DAILY
COMMUNITY

## 2023-01-27 NOTE — PROGRESS NOTES
Care Transitions Initial Call    Call within 2 business days of discharge: No, CTN was unaware of discharge from [facility] until yesterday. Patient: Demetra Snider Patient : 1940 MRN: [id number]    Last Discharge [address]       Date Complaint Diagnosis Description Type Department Provider    22 Positive For Covid-19 COVID-19 ED (DISCHARGE) Cortney Mars MD          Patient was admitted to [facility] from 23 to 23 for syncope and acute traumatic R posterior medical tibial plateau fracture. Syncope 2/2 postural dizziness/hypotension from Lasix/increased hydralazine compounded by dehydration from osmotic diuresis d/t poorly controlled DM. Cardiac event monitor placed. Patient was admitted to [facility]) from 23 to 23. Was this an external facility discharge? Yes, 23  Discharge Facility: [facility]    Challenges to be reviewed by the provider   Additional needs identified to be addressed with provider: no  none         Method of communication with provider : none    COVID-19 related testing was available at this time. Test results were negative. Advance Care Planning:   Does patient have an Advance Directive: yes, reviewed and current, decision makers updated. Advance Care Planning   Healthcare Decision Maker:       Primary Decision Maker: Haley Flowers - Spouse - [phone]    Secondary Decision Maker: Zaynab Montalvo - Daughter - [phone]    Click here to complete [address] including selection of the Healthcare Decision Maker Relationship (ie \"Primary\")  Today we documented Decision Maker(s) consistent with ACP documents on file. Inpatient Readmission Risk score: No data recorded  Was this a readmission? no   Patient stated reason for the admission: Mainly for my knee fracture.     Patients top risk factors for readmission:  falls and medical condition-tibial fracture, poorly controlled DM    Interventions to address risk factors: Scheduled appointment with PCP-CTN offered to schedule appt with PCP, but patient declined d/t stating he has another appt coming up with his nephrologist and the providers' offices are not located near his home., Obtained and reviewed discharge summary and/or continuity of care documents, Education of patient/family/caregiver/guardian to support self-management-Reviewed target BG and BP and when to call PCP. Reviewed expected time period to begin seeing results since patient started Ozempic this week. , and Assessment and support for treatment adherence and medication management-The patient states he is doing well. He was scheduled to see orthopedic specialist Dr. Brock Opitz, but the office did not take his insurance and he switched to [address], Dr. Jayne Gibson. Attended appt this past Monday, removed immobilizer, and will follow up appt in 3 weeks. Able to bear weight minimally with walker. Pain is always < 2/10. Not too much swelling. Checking BG 2-3x/day and BP 3x/day, details noted below in goals. He has seen an endocrinologist in the past and will consider returning for an appt. He was finally able to fill Ozempic, as his pharmacy had been out of stock. He started Ozempic on Tuesday this week and stopped taking Januvia. He removed the cardiac monitor today and results will be sent to PCP. Care Transition Nurse (CTN) contacted the patient by telephone to perform post hospital discharge assessment. Verified name and  with patient as identifiers. Provided introduction to self, and explanation of the CTN role. The patient handed the phone to his spouse Patti Del Angel to complete the med rec. CTN reviewed discharge instructions, medical action plan and red flags with  patient/family  who verbalized understanding. Were discharge instructions available to patient? N/a.  Reviewed appropriate site of care based on symptoms and resources available to patient including: PCP. Patient/Family  given an opportunity to ask questions and does not have any further questions or concerns at this time. The  patient/family  agrees to contact the PCP office for questions related to their healthcare. Medication reconciliation was performed with  spouse , who verbalizes understanding of administration of home medications. Advised obtaining a 90-day supply of all daily and as-needed medications. Referral to Pharm D needed: no     Home Health/Outpatient orders at discharge: home health care, PT, OT, and [address]: [facility]  Date of initial visit: 1/22/23    Durable Medical Equipment ordered at discharge:  [address] [place] received: Yes    Was patient discharged with a pulse oximeter? N/a    Discussed follow-up appointments. If no appointment was previously scheduled, appointment scheduling offered:  yes, but patient declined . Is follow up appointment scheduled within 7 days of discharge? Orthopedic appt was scheduled within 7 days, PCP appt was scheduled within 7 days, but was canceled .    [facility] follow up appointment(s):   Future Appointments   Date Time Provider Department Center   1/31/2023 To Be Determined Xochitl Ivy, PTA [address] [place] [address]   2/1/2023 To Be Determined APRIL [place] [facility]   2/3/2023 To Be Determined Xochitl Josephm, PTA [facility]   2/7/2023 To Be Determined Xochitl Austin, PTA [address] [place] [address]   2/10/2023 To Be Determined Xochitl Josephm, PTA [facility]   2/14/2023 To Be Determined Xochitl Josephm, PTA [address] [place] [address]   2/17/2023 To Be Determined Chris Radford, PT [address] [place] [address]   3/9/2023  8:30 AM Lyndsey Villeda MD BSMA BS AMB     Non-[facility] follow up appointment(s):   Orthopedic Specialist Dr. Tahmina Pederson in 3 weeks (~2/13/23)  Nephrologist Dr. Gay Chu on 2/22/23      Plan for follow-up call in 5-7 days based on severity of symptoms and risk factors. Plan for next call: symptom management-assess for red flags and self management-review BP and BG logs. Confirm if Ozempic has started lowering BG. CTN provided contact information for future needs. Goals Addressed                   This Visit's Progress     Attends follow-up appointments as directed. Patient will schedule and attend recommended follow up appointments over the next 30 days. 01/27/23  Patient declined MARTHA appt with PCP Dr. Judge Manuel. Appt scheduled with Orthopedic Specialist Dr. Geetha Grande in 3 weeks (~2/13/23). Appt scheduled with Nephrologist Dr. Leida Plaza on 2/22/23. Prevent complications post hospitalization. Patient will check BP 3 times daily and keep a log. He will notify PCP for large fluctuations in BP or BP < 100/50.  01/27/23: Checking BP 3x/day, Wednesday: /58 lunch 132/70, dinner 143/73. Thursday: 133/69. Today: 131/71. Lowest SBP was 113 in last week. Typically DBP remains in the 60s. Patient will check BG 2-3 times per day and keep a log. He will notify PCP of BG consistently > 250 or if BG does not start to decline after 1-2 weeks of starting Ozempic.  01/27/23: Checking blood sugars 2-3x/day. Sunday: 146, 164, 178. Monday: 171, 217. Tuesday: 267, 186, 158. Wednesday: 196, 232, 293. Thursday:186. Today: .

## 2023-01-30 ENCOUNTER — HOME CARE VISIT (OUTPATIENT)
Dept: HOME HEALTH SERVICES | Facility: HOME HEALTH | Age: 83
End: 2023-01-30
Payer: MEDICARE

## 2023-01-30 ENCOUNTER — HOME CARE VISIT (OUTPATIENT)
Dept: SCHEDULING | Facility: HOME HEALTH | Age: 83
End: 2023-01-30
Payer: MEDICARE

## 2023-01-30 PROCEDURE — G0157 HHC PT ASSISTANT EA 15: HCPCS

## 2023-02-03 ENCOUNTER — TELEPHONE (OUTPATIENT)
Dept: FAMILY MEDICINE CLINIC | Age: 83
End: 2023-02-03

## 2023-02-03 ENCOUNTER — PATIENT OUTREACH (OUTPATIENT)
Dept: CASE MANAGEMENT | Age: 83
End: 2023-02-03

## 2023-02-03 ENCOUNTER — HOME CARE VISIT (OUTPATIENT)
Dept: SCHEDULING | Facility: HOME HEALTH | Age: 83
End: 2023-02-03
Payer: MEDICARE

## 2023-02-03 VITALS
RESPIRATION RATE: 16 BRPM | DIASTOLIC BLOOD PRESSURE: 70 MMHG | RESPIRATION RATE: 16 BRPM | OXYGEN SATURATION: 95 % | HEART RATE: 58 BPM | OXYGEN SATURATION: 98 % | DIASTOLIC BLOOD PRESSURE: 64 MMHG | HEART RATE: 61 BPM | SYSTOLIC BLOOD PRESSURE: 114 MMHG | DIASTOLIC BLOOD PRESSURE: 60 MMHG | SYSTOLIC BLOOD PRESSURE: 120 MMHG | TEMPERATURE: 98 F | SYSTOLIC BLOOD PRESSURE: 140 MMHG | TEMPERATURE: 99 F | TEMPERATURE: 97.2 F | RESPIRATION RATE: 16 BRPM | HEART RATE: 66 BPM | OXYGEN SATURATION: 96 %

## 2023-02-03 NOTE — HOME HEALTH
SUBJECTIVE: Patient and wife report patient has been seen by orthopedic MD and no longer has to wear his immobilizer. CAREGIVER INVOLVEMENT/ASSISTANCE NEEDED FOR: Wife and family able to offer patient assist as needed including meds  HOME HEALTH SUPPLIES BY TYPE AND QUANTITY ORDERED/DELIVERED THIS VISIT INCLUDE: none  OBJECTIVE:  See interventions. PATIENT RESPONSE TO TREATMENT:  Good-no increase in pain reported following treatment-only fatigue  PATIENT LEVEL OF UNDERSTANDING OF EDUCATION PROVIDED: Good-patient and wife instructed with painmgmt/cryotherapy, fall risk prevention, care of diabetic feet,  and pressure relief  ASSESSMENT OF PROGRESS TOWARD GOALS: Gait training with FWW within home close SBA from Min A with cues to increase B step length for each foot to pass the other, heell/toe patterning and decreased WB on B UE, Sit to stand with B UE to push up SBA with L foot slightly forward and shifted R-needs reinforcement. Review of HEP-good quality. Patient able to lift R lE onto bed utilizing L LE after instruction by return demonstration. CONTINUED NEED FOR THE FOLLOWING SKILLS: Patient will be seen 3 x week for Physical Therapy to continue to work toward goals within POC and HHPT is medically necessary to address  dx and clinical findings: decreased ROM, decreased strength, impaired gait, decreased ability w stair negotiation, increased swelling, decreased bed mobility, decreased transfer status, decreased endurance, decreased balance and decreased safety, increased pain in order to improve functional mobility/quality of life, decrease burden of care, reduce risk for re-hospitalization, work towards patient's personal goals of return to PLOF w decrease risk for falls. PLAN FOR NEXT VISIT: Gait/transfer training, balance retraining, strengthening exercises  THE FOLLOWING DISCHARGE PLANNING WAS DISCUSSED WITH THE PATIENT/CAREGIVER: This is visit number 2  out of  9  planned visits.   NEXT MD APPT:MARC

## 2023-02-03 NOTE — TELEPHONE ENCOUNTER
Edel Alfonso from [address] [place] [address] gave the office a call to inform that patient's been having episodes of stroke like symptoms, and also lightheadedness. Patient is also having  aphasia. Edel Alfonso advised for patient to schedule an apt. LPN advised patient that he needs to be seen at an ER.

## 2023-02-03 NOTE — PROGRESS NOTES
Care Transitions Follow Up Call    Challenges to be reviewed by the provider   Additional needs identified to be addressed with provider: no  none           Method of communication with provider : none    Care Transition Nurse (CTN) contacted the  patient's wife Kristopher Bourne, on PHI release,  by telephone to follow up after admission on 1/9/23. She stated she and the patient are unavailable to talk at this time, as they are preparing to go to the ED. The [address] visited this morning and the patient was exhibiting stroke-like symptoms and she advised an ED visit. CTN advised that time is very important with a stroke and the patient should be seen ASAP. She verbalized understanding. Addressed changes since last contact:  Patient preparing to return to the ED for stroke-like symptoms  Follow up appointment completed? no.   Was follow up appointment scheduled within 7 days of discharge? no.     Advance Care Planning:   Does patient have an Advance Directive:  yes; reviewed and current  and health care decision makers updated        Patients top risk factors for readmission:  falls and medical condition-tibial fracture, poorly controlled DM        [address] follow up appointment(s):   Future Appointments   Date Time Provider Chrissy Walton   2/3/2023  1:00 PM Miky Amos, OT [address]   2/7/2023 To Be Determined Kvng Turner, PTA [address]   2/10/2023 To Be Determined Kvng Turner, PTA [address]   2/14/2023 To Be Determined Kvng Turner, PTA [address]   2/17/2023 To Be Determined Fidel Urrutia, PT [address] [facility]     Non-[facility] follow up appointment(s):   Orthopedic Specialist Dr. Judy Pantoja in 3 weeks (~2/13/23)  Nephrologist Dr. Luis A Randhawa on 2/22/23    CTN provided contact information for future needs. Plan for follow-up call in 3-5 days based on severity of symptoms and risk factors.   Plan for next call:  follow up on ED visit       Goals Addressed    None

## 2023-02-03 NOTE — HOME HEALTH
SUBJECTIVE: Patient reports he is taking Senna 2 tablets a day-once in the morning and one at night as directed \"I have heard Ozempic makes you constipated. \"  CAREGIVER INVOLVEMENT/ASSISTANCE NEEDED FOR: Family is able to offer any assist that is needed  [address] ORDERED/DELIVERED THIS VISIT INCLUDE: none  OBJECTIVE:  See interventions. PATIENT RESPONSE TO TREATMENT:  Good no increase in pain is reported following treatment  PATIENT LEVEL OF UNDERSTANDING OF EDUCATION PROVIDED: Patient instructed with fall risk prevention, pain mgmt-goal for pressure relief met  ASSESSMENT OF PROGRESS TOWARD GOALS: Able to advance to 3 sets of 10 with standing exercises in HEP and addition of B hip ext. Gait-within home on even surfaces within home without an AD with good B foot clearance cues for maintenance of upright posturing-slightly forward Sup-working with postural awareness during gait and standing exercise through cues and instruction. CONTINUED NEED FOR THE FOLLOWING SKILLS: Patient will be seen 2 x week for Physical Therapy to continue to work toward goals within POC and HHPT is medically necessary to address  dx and clinical findings: decreased ROM, decreased strength, impaired gait, decreased ability w stair negotiation, increased swelling, decreased bed mobility, decreased transfer status, decreased endurance, decreased balance and decreased safety, increased pain in order to improve functional mobility/quality of life, decrease burden of care, reduce risk for re-hospitalization, work towards patient's personal goals of return to PLOF w decrease risk for falls. PLAN FOR NEXT VISIT: Gait training on uneven surfaces as weather allows, stair mobility  THE FOLLOWING DISCHARGE PLANNING WAS DISCUSSED WITH THE PATIENT/CAREGIVER: This is visit number  4 out of   9 planned visits.   NEXT MD APPT:MARC

## 2023-02-03 NOTE — HOME HEALTH
SUBJECTIVE: Patient reports he is feeling stronger-wife in to observe part of the visit    CAREGIVER INVOLVEMENT/ASSISTANCE NEEDED FOR: Wife and family able to offer patient assist as needed including meds    HOME HEALTH SUPPLIES BY TYPE AND QUANTITY ORDERED/DELIVERED THIS VISIT INCLUDE: none    OBJECTIVE:  See interventions. PATIENT RESPONSE TO TREATMENT:  Good-no increase in pain reported following treatment-only fatigue    PATIENT LEVEL OF UNDERSTANDING OF EDUCATION PROVIDED: Good-patient and wife instructed with painmgmt/cryotherapy, fall risk prevention, care of diabetic feet,  and pressure relief    ASSESSMENT OF PROGRESS TOWARD GOALS: Gait training with FWW within home close Sup from SBA with few cues to increase B step length for each foot to pass the other, heel/toe patterning and maintenance of upright posturing Sit to stand with B UE to push up Sup from SBA with more equalized WB and shifted R-needs reinforcement. Good tolerance of progression of HEP to include standing exercises with support of the counter-no increase in pain reported. Slight difficulty with stepping backward with R LE able to self correct LOB however guarded with close SBA. CONTINUED NEED FOR THE FOLLOWING SKILLS: Patient will be seen 3 x week for Physical Therapy to continue to work toward goals within POC and HHPT is medically necessary to address  dx and clinical findings: decreased ROM, decreased strength, impaired gait, decreased ability w stair negotiation, increased swelling, decreased bed mobility, decreased transfer status, decreased endurance, decreased balance and decreased safety, increased pain in order to improve functional mobility/quality of life, decrease burden of care, reduce risk for re-hospitalization, work towards patient's personal goals of return to PLOF w decrease risk for falls.     PLAN FOR NEXT VISIT: Gait/transfer training, balance retraining, strengthening exercises    THE FOLLOWING DISCHARGE PLANNING WAS DISCUSSED WITH THE PATIENT/CAREGIVER: This is visit number 3  out of  9  planned visits.     NEXT MD APPT:MARC

## 2023-02-05 ENCOUNTER — HOME CARE VISIT (OUTPATIENT)
Dept: HOME HEALTH SERVICES | Facility: HOME HEALTH | Age: 83
End: 2023-02-05
Payer: MEDICARE

## 2023-02-06 ENCOUNTER — PATIENT OUTREACH (OUTPATIENT)
Dept: CASE MANAGEMENT | Age: 83
End: 2023-02-06

## 2023-02-06 NOTE — HOME HEALTH
Hospital Course     Chief Complaint/History of Present Illness:   Per admission HPI:    Chief Complaint: Fall, knee pain    Mr. Ivania Cadena is admitted following syncopal event at home and subsequent knee pain. Patient was home by himself and was in the kitchen when he felt dizzy and passed out as he woke up on the floor lying on his back. He tried to get up and noted right knee pain and inability to bear weight and so he crawled to the living room to sit in a chair until his wife came home and brought him to ED. In ED, imaging revealed tibial plateau fracture with knee effusion. Head CT and EKG negative for acute change. Ortho consulted in ED. At time of exam, patient notes his knee pain has improved following getting a dose of morphine. Wife notes hydralazine dose recently increased by PCP but states he had a syncopal event last month before the medication was adjusted and at that time patient had eye movements that made her concerned for seizure. Problems Managed During Hospitalization:  -Syncope  Postural dizziness/hypotension from lasix/increased hydralazine compounded by dehydration from osmotic diuresis from poorly controlled sugars/diabetic neuropathy  R/o cardiac/neuro etiology  -Acute traumatic Right posterior medial tibial plateau fracture with moderate knee effusion  -Elevated troponin, flat, demand mediated type II NSTEMI from above vs renally mediated. -CKD IIIb (baseline cr 2.5-2.6)  -NIDDM type II, with hyperglycemia/glucosoria A1c 8.8  -HTN  -BPH    Hospital Course:  He reported syncopal event after getting up to get to kitchen, His hydralazine dose was recently increased. Also prior episode was after coming out of hot shower. Patient noted on lasix and with hyperglycemia/Glucosuria on admission, ,A1c 8.8. Suspect episode related to hypovolemia and autonomic dysfunction from his DM. His telemetry has been uneventful.  He had 2D echo, results below with no major valvular concerns, EEG with generalized slowing but no epileptiform activity, MRI brain, results below, neg for acute concerns, and PVL carotids with no significant stenosis. He will be discharged on 2 weeks event monitor. His lasix is held and diabetic education provided. His renal function is around baseline. For his tibial plateau fracture, he was seen by orthopedic recommended, non operative management, WBAT and knee immobilizer.  He was seen by PT/OT and recommended SNF>   He will be discharged to SNF in stable condition with outpatient orthopedic follow up, on 2 weeks chantalo patch

## 2023-02-06 NOTE — CASE COMMUNICATION
Spoke with pt's spouse this morning to confirm OT visit for 12-12:30 today. Spouse called back shortly after 10 am to state the the [address] [place] [address] was there for an annual visit and was sending pt to the ER at [facility]. Spoke with nurse on the phone and she states that pt has stroke like symptoms. She reports his vitals were unremarkable; however, pt was having difficulty ambulating and slurred speech. Will cance l visit from this afternoon and follow up to reschedule OT evaluation if appropriate next week.

## 2023-02-06 NOTE — PROGRESS NOTES
Patient was readmitted to [facility] on 2/3/23 for orthostatic hypotension and concern for TIA/CVA. Resolving current Transitions of Care episode due to current admission status.

## 2023-02-08 ENCOUNTER — PATIENT OUTREACH (OUTPATIENT)
Dept: CASE MANAGEMENT | Age: 83
End: 2023-02-08

## 2023-02-08 NOTE — PROGRESS NOTES
Care Transitions Initial Call  ( Message left)     Call within 2 business days of discharge: yes     Patient: Carmelita Martínez Patient : 1940 MRN: [id number]    Last Discharge [address]       Date Complaint Diagnosis Description Type Department Provider    22 Positive For Covid-19 COVID-19 ED (DISCHARGE) Luke Gilbert MD            Was this an external facility discharge? Yes, 2023    Discharge Facility: [address] [place] [address] 30 to be reviewed by the provider   Additional needs identified to be addressed with provider:   None at this time          Method of communication with provider : None     Care Transitions Nurse ( CTN) attempted to contact patient via telephone call. There was no response. A voicemail message was left requesting a non-emergency return telephone call. CTN  contact information provided. No medical infomration was left on the voicemail message. CTN reviewed  COVID-19 related testing which was not done at this time. Please see laboratory testing / results for additional verification or information as needed. Advance Care Planning:   Does patient have an Advance Directive:  on file per chart review. .     Inpatient Readmission Risk score: No data recorded  Was this a readmission? yes   Patient stated reason for the admission: n/a at this time. Patients top risk factors for readmission:   Medical Condition  - Please see discharge summary or electronic medical record (EMR) for additional information as needed.      Interventions to address risk factors: Obtained and reviewed discharge summary and/or continuity of care documents      Home Health/Outpatient orders at discharge: none noted at this time  [address]: n/a  Date of initial visit: [facility] ordered at discharge:  none noted at this time   [address] [address]:   [employer] received:     Was patient discharged with a pulse oximeter? no, not noted at this time     Is follow up appointment scheduled within 7 days of discharge? yes. Discussed follow-up appointments. If no appointment was previously scheduled, appointment scheduling offered:  n/a  . [facility] follow up appointment(s):   Future Appointments   Date Time Provider Chrissy Walton   2/10/2023  1:30 PM Rex BENJAMIN MD BSMA BS AMB     Non-BS/Specialty  follow up appointment(s): none noted at this time     Plan for follow-up call in 7-10 days/as needed  based on severity of symptoms and risk factors. Plan for next call: Follow up with appointment    CTN provided contact information for future needs. Goals Addressed                   This Visit's Progress     Attends follow-up appointments as directed.         Target Date:  3/8/23    2/8/2023   Patient to follow up with:    PCP Transition of Care follow up appoointment is scheduled for 2/1/23

## 2023-02-09 ENCOUNTER — PATIENT OUTREACH (OUTPATIENT)
Dept: CASE MANAGEMENT | Age: 83
End: 2023-02-09

## 2023-02-09 RX ORDER — ATORVASTATIN CALCIUM 40 MG/1
TABLET, FILM COATED ORAL
COMMUNITY
Start: 2023-02-07 | End: 2023-02-10 | Stop reason: SDUPTHER

## 2023-02-09 RX ORDER — FLUDROCORTISONE ACETATE 0.1 MG/1
TABLET ORAL
COMMUNITY
Start: 2023-02-07

## 2023-02-09 RX ORDER — HYDRALAZINE HYDROCHLORIDE 100 MG/1
TABLET, FILM COATED ORAL
COMMUNITY

## 2023-02-09 NOTE — PROGRESS NOTES
Care Transitions Initial Call  (Patient contacted)     Call within 2 business days of discharge: yes     Patient: Chivo Bowles Patient : 1940 MRN: [id number]        Was this an external facility discharge? Yes, [facility])     Discharge Date:   23     Challenges to be reviewed by the provider   Additional needs identified to be addressed with provder: yes     Patient reports that he is feeling good. Hydralazine order updated on the [facility] medication record   Atorvastin order updated on the [employer] record. Would PCP please remove similar mediation as you see fit? Cyanocobalamin appears to be listed as a different dosage on the [facility] medication list. Please see dc summary for specifics / verification. Thank you so much! Method of communication with provider :  chart routing       Care Transition Nurse (CTN) contacted the patient by telephone to perform post hospital discharge assessment. Verified name and  with patient as identifiers. Provided introduction to self, and explanation of the CTN role. Advance Care Planning:   ACP on file per chart review. Inpatient Readmission Risk score: No data recorded  Was this a readmission? yes   Patient stated reason for the admission: Patient did not state    Patients top risk factors for readmission:   Medical Condition  - Please see discharge summary or electronic medical record (EMR) for additional information as needed. Interventions to address risk factors:   Obtained and reviewed discharge summary and/or continuity of care documents  CTN inquired if patient would like to resume home health services. Patient related that he has a follow up appointment with PCP scheduled and he will discuss with PCP as needed. CTN reviewed discharge instructions, medical action plan and red flags with patient who verbalized understanding.      Were discharge instructions available to patient? yes. Reviewed appropriate site of care based on symptoms and resources available to patient including: PCP, After hours contact number-provider office phone number , and When to call 911. Patient given an opportunity to ask questions and does not have any further questions or concerns at this time. The patient agrees to contact the PCP office for questions related to their healthcare. Medication reconciliation was performed with patient, who verbalizes understanding of administration of home medications. Referral to Pharm D needed: no, not at this time     Home Health/Outpatient orders at discharge: none, per patient   [address]: n/a  Date of initial visit: [facility] ordered at discharge: None, noted at this time   [address] received: n/a     Was patient discharged with a pulse oximeter? no, not noted at this time     Is follow up appointment scheduled within 7 days of discharge? yes. Discussed follow-up appointments. If no appointment was previously scheduled, appointment scheduling offered:  n/a  . [facility] follow up appointment(s):   Future Appointments   Date Time Provider Chrissy Walton   2/10/2023  1:30 PM Laney BENJAMIN MD [facility] BS AMB     Non-[facility]/Specialty  follow up appointment(s):   None noted at this time     Plan for follow-up call in 7-10 days/as needed  based on severity of symptoms and risk factors. Plan for next call: Follow up with appointment    CTN provided contact information for future needs. Goals Addressed                   This Visit's Progress     Attends follow-up appointments as directed. Target Date:  3/8/23    2/8/2023   Patient to follow up with:    PCP Transition of Care follow up appoointment is scheduled for 2/10/23. Patient contacted via telephone call for review of medications. Patient name and date of birth verified.

## 2023-02-09 NOTE — PROGRESS NOTES
Care Transitions Initial Call    Call within 2 business days of discharge: yes     Patient: Amanda Godwin Patient : 1940 MRN: [id number]    Last Discharge [address]       Date Complaint Diagnosis Description Type Department Provider    22 Positive For Covid-19 COVID-19 ED (DISCHARGE) PAMELA Matthews MD            Was this an external facility discharge? Yes, 23    Discharge Facility: [address] [place] [address] Transitions Nurse ( CTN) attempted to contact patient via telephone call to the listed home phone number. There was no response. A voicemail message was left requesting a non-emergency return telephone call. CTN  contact information provided. No medical information was left on the voicemail message. Care Transitions Nurse ( CTN) attempted to contact patient via telephone call to the listed mobile phone number. There was no response. A voicemail message was left requesting a non-emergency return telephone call. CTN  contact information provided. No medical information was left on the voicemail message.

## 2023-02-10 ENCOUNTER — PATIENT OUTREACH (OUTPATIENT)
Dept: CASE MANAGEMENT | Age: 83
End: 2023-02-10

## 2023-02-10 ENCOUNTER — OFFICE VISIT (OUTPATIENT)
Dept: FAMILY MEDICINE CLINIC | Age: 83
End: 2023-02-10
Payer: MEDICARE

## 2023-02-10 ENCOUNTER — APPOINTMENT (OUTPATIENT)
Dept: FAMILY MEDICINE CLINIC | Age: 83
End: 2023-02-10

## 2023-02-10 VITALS
HEIGHT: 68 IN | BODY MASS INDEX: 26.98 KG/M2 | HEART RATE: 66 BPM | WEIGHT: 178 LBS | OXYGEN SATURATION: 97 % | RESPIRATION RATE: 16 BRPM | TEMPERATURE: 97 F | SYSTOLIC BLOOD PRESSURE: 120 MMHG | DIASTOLIC BLOOD PRESSURE: 66 MMHG

## 2023-02-10 DIAGNOSIS — E78.2 MIXED HYPERLIPIDEMIA: ICD-10-CM

## 2023-02-10 DIAGNOSIS — Z79.4 TYPE 2 DIABETES MELLITUS WITH STAGE 3B CHRONIC KIDNEY DISEASE, WITH LONG-TERM CURRENT USE OF INSULIN (HCC): ICD-10-CM

## 2023-02-10 DIAGNOSIS — N18.32 TYPE 2 DIABETES MELLITUS WITH STAGE 3B CHRONIC KIDNEY DISEASE, WITH LONG-TERM CURRENT USE OF INSULIN (HCC): ICD-10-CM

## 2023-02-10 DIAGNOSIS — E11.22 TYPE 2 DIABETES MELLITUS WITH STAGE 3B CHRONIC KIDNEY DISEASE, WITH LONG-TERM CURRENT USE OF INSULIN (HCC): ICD-10-CM

## 2023-02-10 DIAGNOSIS — G45.9 TIA (TRANSIENT ISCHEMIC ATTACK): ICD-10-CM

## 2023-02-10 DIAGNOSIS — I95.1 ORTHOSTATIC HYPOTENSION: Primary | ICD-10-CM

## 2023-02-10 DIAGNOSIS — I10 ESSENTIAL HYPERTENSION: ICD-10-CM

## 2023-02-10 RX ORDER — ATORVASTATIN CALCIUM 40 MG/1
40 TABLET, FILM COATED ORAL DAILY
Qty: 90 TABLET | Refills: 1 | Status: SHIPPED | OUTPATIENT
Start: 2023-02-10

## 2023-02-10 NOTE — PROGRESS NOTES
Ramiro Mills is a 81 y.o. male (: 1940)  Pt is not fasting. Pt is in Room # 4 presenting to address:    Chief Complaint   Patient presents with    Hospital Follow Up       There were no vitals filed for this visit. Hearing/Vision:   No results found. Learning Assessment:     Learning Assessment 2017   PRIMARY LEARNER Patient   HIGHEST LEVEL OF EDUCATION - PRIMARY LEARNER  4 YEARS OF COLLEGE   BARRIERS PRIMARY LEARNER NONE   CO-LEARNER CAREGIVER No   PRIMARY LANGUAGE ENGLISH    NEED No   LEARNER PREFERENCE PRIMARY VIDEOS   ANSWERED BY self   RELATIONSHIP SELF     Depression Screening:     3 most recent PHQ Screens 2/10/2023   Little interest or pleasure in doing things Not at all   Feeling down, depressed, irritable, or hopeless Not at all   Total Score PHQ 2 0     Fall Risk Assessment:     Fall Risk Assessment, last 12 mths 2023   Able to walk? Yes   Fall in past 12 months? 0   Do you feel unsteady? 0   Are you worried about falling 0   Number of falls in past 12 months -   Fall with injury? -     Abuse Screening:     Abuse Screening Questionnaire 2023   Do you ever feel afraid of your partner? N   Are you in a relationship with someone who physically or mentally threatens you? N   Is it safe for you to go home?  Y     ADL Assessment:     ADL Assessment 2022   Feeding yourself No Help Needed   Getting from bed to chair No Help Needed   Getting dressed No Help Needed   Bathing or showering No Help Needed   Walk across the room (includes cane/walker) No Help Needed   Using the telphone No Help Needed   Taking your medications No Help Needed   Preparing meals No Help Needed   Managing money (expenses/bills) No Help Needed   Moderately strenuous housework (laundry) No Help Needed   Shopping for personal items (toiletries/medicines) No Help Needed   Shopping for groceries No Help Needed   Driving No Help Needed   Climbing a flight of stairs No Help Needed   Getting to places beyond walking distances No Help Needed        Coordination of Care Questionaire:   1. \"Have you been to the ER, urgent care clinic since your last visit? Hospitalized since your last visit? \" Yes [facility] storke like symptoms     2. \"Have you seen or consulted any other health care providers outside of the [address] [place] [address] since your last visit? \" No     3. For patients aged 39-70: Has the patient had a colonoscopy / FIT/ Cologuard? No      If the patient is female:    4. For patients aged 41-77: Has the patient had a mammogram within the past 2 years? NA - based on age or sex  See top three    5. For patients aged 21-65: Has the patient had a pap smear? NA - based on age or sex    Advanced Directive:   1. Do you have an Advanced Directive? YES    2. Would you like information on Advanced Directives?  NO

## 2023-02-10 NOTE — PROGRESS NOTES
HISTORY OF PRESENT ILLNESS  Laura Hernandez is a 81 y.o. male. HPI  Pt was in the hospital from 2-3-2023 until 2-7-2023, the D/C summary and hospital records reviewed today, the NN note on 2-9-2023 reviewed today. TIA, pt had left arm weakness and slurred speech during the visit from his home health nurse, he felt dizzy and about to pass out, he was taken to the Hospital, he had a negative work up for CVA, his symptoms resolved when he was in the hospital. His MRA showed no significant stenosis, his MRI showed old punctate focus in the left cerebellar hemisphere unchanged from previous exam and no acute changes, his CT scan was negative, no weakness or slurred speech since discharged  Orthostatic hypotension, his Bp was dropping from 180 to 120's upon standing in the hospital, he was started on low dose Florinef, improved, he has no dizziness since he was discharged. DM with CKD, improving, glucose 100-130 in am, and 160 in the afternoon, he is on jardiance/ozempic and glucotrol, his A1c in the hospital recently was 8.5%  HTN, stable, his BP is controlled now, use hydralazine only if over 661 systolic, on lasix/bystolic daily  HLD, stable on lipitor, his dose was increased to 40 mg in the hospital.    He will follow up next with with Nephrology for his CKD  Review of Systems   Constitutional:  Negative for chills and fever. Respiratory:  Negative for cough and shortness of breath. Cardiovascular:  Negative for chest pain and leg swelling. Neurological:  Negative for dizziness, focal weakness, loss of consciousness and headaches. Physical Exam  Vitals reviewed. Neck:      Vascular: No carotid bruit. Cardiovascular:      Rate and Rhythm: Normal rate and regular rhythm. Heart sounds: Normal heart sounds. No murmur heard. Pulmonary:      Effort: Pulmonary effort is normal. No respiratory distress. Breath sounds: Normal breath sounds. No wheezing. Abdominal:      Palpations: Abdomen is soft. Tenderness: There is no abdominal tenderness. Musculoskeletal:      Right lower leg: No edema. Left lower leg: No edema. Neurological:      General: No focal deficit present. Mental Status: He is oriented to person, place, and time. Cranial Nerves: No cranial nerve deficit. Motor: No weakness. Gait: Gait normal.       ASSESSMENT and PLAN  Diagnoses and all orders for this visit:    1. Orthostatic hypotension, currently stable, continue Florinef, half tablet daily  -     METABOLIC PANEL, BASIC; Future    2. TIA (transient ischemic attack), resolved, continue asa daily    3. Type 2 diabetes mellitus with stage 3b chronic kidney disease, with long-term current use of insulin ([facility] [address] improving, continue glipizide/ozempic/jardiance @ current dose  -     METABOLIC PANEL, BASIC; Future    4. Essential hypertension, controlled, continue Bystolic/lasix @ current dose  -     METABOLIC PANEL, BASIC; Future    5. Mixed hyperlipidemia, stable  -     atorvastatin (LIPITOR) 40 mg tablet; Take 1 Tablet by mouth daily. I tab by mouth every night at bedtime    MRI HEAD without CONTRAST    Anatomical Region Laterality Modality   Head -- Magnetic Resonance     Impression      1. Mild atrophy and minimal chronic small vessel changes without significant interval change. 2. Punctate focus of presumed old hemorrhage in the left cerebellar hemisphere, unchanged and most often attributable to the sequela of uncontrolled hypertension. 3. Otherwise, unremarkable evaluation. Specifically, no acute intracranial abnormalities are identified. Signed By: Froylan Curiel MD on 2/4/2023 2:03 PM    MRA HEAD without CONTRAST    Anatomical Region Laterality Modality   Head -- Magnetic Resonance     Impression      1. Unremarkable MR angiogram of the intracranial vasculature.      *  * **         Signed By: Froylan Curiel MD on 2/4/2023 2:05 PM      CT HEAD W/O CONTRAST    Anatomical Region Laterality Modality   Head -- Computed Tomography     Impression      No acute process. Signed By: Manohar Botello MD on 2/3/2023 11:49 AM      CARDIAC EVENT MONITOR    Narrative    Kirti Hobson MD     2/1/2023  5:37 PM   PHYSICIAN INTERPRETATION:     This is a 2-week event monitor with underlying rhythm   predominantly sinus with average heart rate of 58 bpm with a   range from 45bpm to 129 bpm.   2 episodes of nonsustained PAT/SVT with the longest for about 12   beats at the rate of 129 bpm without any symptoms. Less than 1% PAC and PVC noted. No symptomatic transmission or atrial fibrillation. Robert Castro MD, [address] [address] [facility]. [phone]        Contains abnormal data Basic Metabolic Panel (BMP) tomorrow am  Specimen:  Blood - Blood (substance)   Ref Range & Units 3 d ago Comments   Potassium 3.5 - 5.5 mmol/L 3.7     Sodium 133 - 145 mmol/L 138     Chloride 98 - 110 mmol/L 109     Glucose 70 - 99 mg/dL 115 High      Calcium 8.4 - 10.5 mg/dL 9.9     BUN 6 - 22 mg/dL 33 High      Creatinine 0.8 - 1.6 mg/dL 1.5     CO2 20 - 32 mmol/L 21     eGFR >60.0 mL/min/1.73 sq.m. 45.4 Low   eGFR calculation based on the Chronic Kidney Disease Epidemiology Collaboration (CKD-EPI) equation refit without adjustment for race. This eGFR is validated for stable chronic renal failure patients. This equation is unreliable in acute illness or patients with normal renal function. Anion Gap 3.0 - 15.0 mmol/L 8.0  Anion Gap calculation based on electrolyte reference ranges.    Resulting Agency  [facility]    Specimen Collected: 02/07/23 01:07 Last Resulted: 02/07/23 01:35     Results for orders placed or performed in visit on 02/10/23   AMB EXT HGBA1C   Result Value Ref Range    Hemoglobin A1c, External 8.5 %     Contains abnormal data Lipid Complete Panel   Specimen:  Blood - Blood (substance)   Ref Range & Units 6 d ago   Cholesterol 110 - 200 mg/dL 87 Low Triglyceride 40 - 149 mg/dL 61    HDL >=40 mg/dL 33 Low     Cholesterol/HDL 0.0 - 5.0 2.6    Non-HDL Cholesterol <130 mg/dL 54    LDL CALCULATION 50 - 99 mg/dL 42 Low     VLDL CALCULATION 8 - 30 mg/dL 12    LDL/HDL Ratio  1.3    Resulting Agency  [facility]   Narrative  Performed by [facility]  Cholesterol Recommended NCEP guidelines in mg/dL:     Less than 200            Desirable   200 - 239                Borderline High   Greater than or  = 240   High           Please Note:   Total Chol/HDL Ratio                      Men     Women   1/2 Avg. Risk    3.4     3.3       Avg. Risk    5.0     4.4   2X  Avg. Risk    9.6     7.1   3X  Avg. Risk   23.4    11.0  Specimen Collected: 02/04/23 01:24 Last Resulted: 02/04/23 07:53     Follow-up and Dispositions    Return in about 3 months (around 5/10/2023).

## 2023-02-10 NOTE — PROGRESS NOTES
Transition of Care     Per chart review, patient attended PCP follow up appointment. Goal updated. Goals Addressed                   This Visit's Progress     Attends follow-up appointments as directed. Target Date:  3/8/23    2/8/2023   Patient to follow up with:    PCP Transition of Care follow up appoointment is scheduled for 2/10/23.    2/10/2023   Patient attended PCP Transitional care follow up appointment.

## 2023-02-11 ENCOUNTER — TELEPHONE (OUTPATIENT)
Dept: FAMILY MEDICINE CLINIC | Facility: CLINIC | Age: 83
End: 2023-02-11

## 2023-02-11 LAB
ANION GAP SERPL CALC-SCNC: 12 MMOL/L (ref 3–15)
BUN SERPL-MCNC: 22 MG/DL (ref 6–22)
CALCIUM SERPL-MCNC: 10.1 MG/DL (ref 8.4–10.5)
CHLORIDE SERPL-SCNC: 110 MMOL/L (ref 98–110)
CO2 SERPL-SCNC: 25 MMOL/L (ref 20–32)
CREAT SERPL-MCNC: 1.7 MG/DL (ref 0.8–1.6)
GLOMERULAR FILTRATION RATE: 39.4 ML/MIN/1.73 SQ.M.
GLUCOSE SERPL-MCNC: 138 MG/DL (ref 70–99)
POTASSIUM SERPL-SCNC: 4.6 MMOL/L (ref 3.5–5.5)
SODIUM SERPL-SCNC: 147 MMOL/L (ref 133–145)

## 2023-02-11 NOTE — TELEPHONE ENCOUNTER
Recent BMP reviewed: glucose is good @ 138, creatinine is 1.7 and GFR 39.4, stable CKD stage 3b, please fax copy to Dr Malka Knight.

## 2023-02-13 ENCOUNTER — TELEPHONE (OUTPATIENT)
Facility: CLINIC | Age: 83
End: 2023-02-13

## 2023-02-13 RX ORDER — FUROSEMIDE 40 MG/1
TABLET ORAL
Qty: 90 TABLET | Refills: 1 | Status: SHIPPED | OUTPATIENT
Start: 2023-02-13

## 2023-02-14 ENCOUNTER — TELEPHONE (OUTPATIENT)
Dept: FAMILY MEDICINE CLINIC | Facility: CLINIC | Age: 83
End: 2023-02-14

## 2023-02-14 ENCOUNTER — CARE COORDINATION (OUTPATIENT)
Facility: CLINIC | Age: 83
End: 2023-02-14

## 2023-02-14 NOTE — TELEPHONE ENCOUNTER
Patient's wife called with concerns of patient low bp readings . He was seen in er and diagnosed with orthostatic hypotension and told not take any bp medications unless his bp was high. Was seen 2/10 by Dr Ramirez for follow up . Reading yesterday was 98/58 and today 104/56 . Wife says he is pale as a sheet of paper . He denies any sob ,chest pain and is eating and drinking normally . I spoke with Dr Ramirez he should take the florinef if only taken a half take the other half only today and continue to Monitor . He has an appointment to see the Kidney Dr next week and is suppose to discuss this issue . I advised if he develops chest pain , sob or isnt eating or drink normally he should go to the er . Wife voiced understanding .

## 2023-02-15 ENCOUNTER — CARE COORDINATION (OUTPATIENT)
Facility: CLINIC | Age: 83
End: 2023-02-15

## 2023-02-15 NOTE — CARE COORDINATION
[facility] Care Transitions Follow Up Call    Care Transitions Nurse ( CTN) attempted to contact patient via telephone call. There was no response. A voicemail message was left requesting a non-emergency return telephone call. CTN  contact information provided. No medical information was left on the voicemail message.

## 2023-02-24 ENCOUNTER — CARE COORDINATION (OUTPATIENT)
Facility: CLINIC | Age: 83
End: 2023-02-24

## 2023-02-24 NOTE — CARE COORDINATION
Care Transitions Outreach Attempt    Attempted to reach patient for transitions of care follow up. Unable to reach patient. A voicemail message was left requesting a non-emergency return telephone call. CTN contact information provided. No medical information was left on the voicemail message.      Patient: Jody Childress Patient : 1940 MRN: [id number]      Noted following upcoming appointments from discharge chart review:   [facility] follow up appointment(s):   Future Appointments   Date Time Provider Sienna España   2023 10:00 AM Obi Helm MD BS[facility] BS AMB

## 2023-03-06 ENCOUNTER — CARE COORDINATION (OUTPATIENT)
Facility: CLINIC | Age: 83
End: 2023-03-06

## 2023-03-06 NOTE — CARE COORDINATION
[facility] Care Transitions Follow Up Call    Patient Current Location:  [facility] Transition Nurse contacted the patient by telephone to follow up after admission on 2/3/2023. Verified name and  with patient as identifiers. Patient: Lissy Javier  Patient : 1940   MRN: [id number]      Discharge Date: 23 RARS: No data recorded  Facility:      [facility]     Needs to be reviewed by the provider   Additional needs identified to be addressed with provider: No  none             Method of communication with provider: none. Patient reports:   - He is doing well     Addressed changes since last contact:  none, reported at this time     Follow Up  Future Appointments   Date Time Provider Sienna España   2023 10:00 AM César Romero MD [facility] BS [facility]     [facility] follow up appointment(s):   None reported at this time. Care Transition Nurse reviewed medical action plan with patient and discussed any barriers to care and/or understanding of plan of care after discharge. Discussed appropriate site of care based on symptoms and resources available to patient including: PCP or follow up at ED as needed for urgent  medical concerns The patient agrees to contact the PCP office for questions related to their healthcare. Advance Care Planning:   ACP on file   reviewed and current. Advance Care Planning   Healthcare Decision Maker:    Primary Decision Maker: Jorgito Juareser - Spouse - [phone]    Secondary Decision Maker: Jazlyn Mehta - Child - [phone]    Click here to complete Healthcare Decision Makers including selection of the Healthcare Decision Maker Relationship (ie \"Primary\"). Interventions:   CTN offered referral to additional [facility] team members for additional follow up calls ([employer] ) for assistance and or supportive services. Patient declined further follow calls at this time.       Care Transitions Subsequent and Final Call    Subsequent and Final Calls  Care Transitions Interventions  Other Interventions:             Care Transition Nurse provided contact information for future needs. No further follow-up call indicated based on severity of symptoms and risk factors.   Plan for next call:  n/a   CTN to evaluate case for closure / resolution of Transition of Care outreach     Danika Weller RN

## 2023-03-08 ENCOUNTER — CARE COORDINATION (OUTPATIENT)
Facility: CLINIC | Age: 83
End: 2023-03-08

## 2023-03-08 NOTE — CARE COORDINATION
Transition of Care     No further outreach planned at this time     Transition of Care episode concluded unless otherwise noted.

## 2023-03-20 RX ORDER — SEMAGLUTIDE 1.34 MG/ML
0.5 INJECTION, SOLUTION SUBCUTANEOUS
Qty: 4.5 ML | Refills: 3 | Status: SHIPPED | OUTPATIENT
Start: 2023-03-20

## 2023-05-11 ENCOUNTER — HOSPITAL ENCOUNTER (OUTPATIENT)
Facility: HOSPITAL | Age: 83
Setting detail: SPECIMEN
Discharge: HOME | End: 2023-05-11
Payer: COMMERCIAL

## 2023-05-11 ENCOUNTER — OFFICE VISIT (OUTPATIENT)
Dept: FAMILY MEDICINE CLINIC | Facility: CLINIC | Age: 83
End: 2023-05-11
Payer: COMMERCIAL

## 2023-05-11 VITALS
BODY MASS INDEX: 26.67 KG/M2 | RESPIRATION RATE: 17 BRPM | HEART RATE: 66 BPM | WEIGHT: 176 LBS | DIASTOLIC BLOOD PRESSURE: 73 MMHG | OXYGEN SATURATION: 95 % | SYSTOLIC BLOOD PRESSURE: 113 MMHG | TEMPERATURE: 96.4 F | HEIGHT: 68 IN

## 2023-05-11 DIAGNOSIS — N18.32 CHRONIC KIDNEY DISEASE, STAGE 3B (HCC): ICD-10-CM

## 2023-05-11 DIAGNOSIS — Z79.4 TYPE 2 DIABETES MELLITUS WITH STAGE 3 CHRONIC KIDNEY DISEASE, WITH LONG-TERM CURRENT USE OF INSULIN, UNSPECIFIED WHETHER STAGE 3A OR 3B CKD (HCC): ICD-10-CM

## 2023-05-11 DIAGNOSIS — I10 ESSENTIAL (PRIMARY) HYPERTENSION: ICD-10-CM

## 2023-05-11 DIAGNOSIS — N18.30 TYPE 2 DIABETES MELLITUS WITH STAGE 3 CHRONIC KIDNEY DISEASE, WITH LONG-TERM CURRENT USE OF INSULIN, UNSPECIFIED WHETHER STAGE 3A OR 3B CKD (HCC): ICD-10-CM

## 2023-05-11 DIAGNOSIS — Z79.4 TYPE 2 DIABETES MELLITUS WITH STAGE 3 CHRONIC KIDNEY DISEASE, WITH LONG-TERM CURRENT USE OF INSULIN, UNSPECIFIED WHETHER STAGE 3A OR 3B CKD (HCC): Primary | ICD-10-CM

## 2023-05-11 DIAGNOSIS — E78.2 MIXED HYPERLIPIDEMIA: ICD-10-CM

## 2023-05-11 DIAGNOSIS — I95.1 ORTHOSTATIC HYPOTENSION: ICD-10-CM

## 2023-05-11 DIAGNOSIS — E11.22 TYPE 2 DIABETES MELLITUS WITH STAGE 3 CHRONIC KIDNEY DISEASE, WITH LONG-TERM CURRENT USE OF INSULIN, UNSPECIFIED WHETHER STAGE 3A OR 3B CKD (HCC): ICD-10-CM

## 2023-05-11 DIAGNOSIS — N18.30 TYPE 2 DIABETES MELLITUS WITH STAGE 3 CHRONIC KIDNEY DISEASE, WITH LONG-TERM CURRENT USE OF INSULIN, UNSPECIFIED WHETHER STAGE 3A OR 3B CKD (HCC): Primary | ICD-10-CM

## 2023-05-11 DIAGNOSIS — E11.22 TYPE 2 DIABETES MELLITUS WITH STAGE 3 CHRONIC KIDNEY DISEASE, WITH LONG-TERM CURRENT USE OF INSULIN, UNSPECIFIED WHETHER STAGE 3A OR 3B CKD (HCC): Primary | ICD-10-CM

## 2023-05-11 LAB
ALBUMIN SERPL-MCNC: 3.8 G/DL (ref 3.4–5)
ALBUMIN/GLOB SERPL: 1.2 (ref 0.8–1.7)
ALP SERPL-CCNC: 64 U/L (ref 45–117)
ALT SERPL-CCNC: 28 U/L (ref 16–61)
ANION GAP SERPL CALC-SCNC: 8 MMOL/L (ref 3–18)
AST SERPL-CCNC: 19 U/L (ref 10–38)
BASOPHILS # BLD: 0 K/UL (ref 0–0.1)
BASOPHILS NFR BLD: 1 % (ref 0–2)
BILIRUB SERPL-MCNC: 0.9 MG/DL (ref 0.2–1)
BUN SERPL-MCNC: 36 MG/DL (ref 7–18)
BUN/CREAT SERPL: 19 (ref 12–20)
CALCIUM SERPL-MCNC: 9.8 MG/DL (ref 8.5–10.1)
CALCIUM SERPL-MCNC: 9.9 MG/DL (ref 8.5–10.1)
CHLORIDE SERPL-SCNC: 109 MMOL/L (ref 100–111)
CHOLEST SERPL-MCNC: 107 MG/DL
CO2 SERPL-SCNC: 23 MMOL/L (ref 21–32)
CREAT SERPL-MCNC: 1.87 MG/DL (ref 0.6–1.3)
CREAT UR-MCNC: 107 MG/DL (ref 30–125)
DIFFERENTIAL METHOD BLD: NORMAL
EOSINOPHIL # BLD: 0.2 K/UL (ref 0–0.4)
EOSINOPHIL NFR BLD: 3 % (ref 0–5)
ERYTHROCYTE [DISTWIDTH] IN BLOOD BY AUTOMATED COUNT: 12.7 % (ref 11.6–14.5)
EST. AVERAGE GLUCOSE BLD GHB EST-MCNC: 160 MG/DL
GLOBULIN SER CALC-MCNC: 3.3 G/DL (ref 2–4)
GLUCOSE SERPL-MCNC: 120 MG/DL (ref 74–99)
HBA1C MFR BLD: 7.2 % (ref 4.2–5.6)
HCT VFR BLD AUTO: 45.6 % (ref 36–48)
HDLC SERPL-MCNC: 48 MG/DL (ref 40–60)
HDLC SERPL: 2.2 (ref 0–5)
HGB BLD-MCNC: 14.7 G/DL (ref 13–16)
IMM GRANULOCYTES # BLD AUTO: 0 K/UL (ref 0–0.04)
IMM GRANULOCYTES NFR BLD AUTO: 0 % (ref 0–0.5)
LDLC SERPL CALC-MCNC: 44.8 MG/DL (ref 0–100)
LIPID PANEL: NORMAL
LYMPHOCYTES # BLD: 2.2 K/UL (ref 0.9–3.6)
LYMPHOCYTES NFR BLD: 32 % (ref 21–52)
MCH RBC QN AUTO: 29.8 PG (ref 24–34)
MCHC RBC AUTO-ENTMCNC: 32.2 G/DL (ref 31–37)
MCV RBC AUTO: 92.3 FL (ref 78–100)
MICROALBUMIN UR-MCNC: 130 MG/DL (ref 0–3)
MICROALBUMIN/CREAT UR-RTO: 1215 MG/G (ref 0–30)
MONOCYTES # BLD: 0.6 K/UL (ref 0.05–1.2)
MONOCYTES NFR BLD: 8 % (ref 3–10)
NEUTS SEG # BLD: 4 K/UL (ref 1.8–8)
NEUTS SEG NFR BLD: 57 % (ref 40–73)
NRBC # BLD: 0 K/UL (ref 0–0.01)
NRBC BLD-RTO: 0 PER 100 WBC
PHOSPHATE SERPL-MCNC: 4.1 MG/DL (ref 2.5–4.9)
PLATELET # BLD AUTO: 237 K/UL (ref 135–420)
PMV BLD AUTO: 10.9 FL (ref 9.2–11.8)
POTASSIUM SERPL-SCNC: 4.7 MMOL/L (ref 3.5–5.5)
PROT SERPL-MCNC: 7.1 G/DL (ref 6.4–8.2)
PROT UR-MCNC: 152 MG/DL
PTH-INTACT SERPL-MCNC: 104.9 PG/ML (ref 18.4–88)
RBC # BLD AUTO: 4.94 M/UL (ref 4.35–5.65)
SODIUM SERPL-SCNC: 140 MMOL/L (ref 136–145)
TRIGL SERPL-MCNC: 71 MG/DL
VLDLC SERPL CALC-MCNC: 14.2 MG/DL
WBC # BLD AUTO: 7 K/UL (ref 4.6–13.2)

## 2023-05-11 PROCEDURE — 80061 LIPID PANEL: CPT

## 2023-05-11 PROCEDURE — 83036 HEMOGLOBIN GLYCOSYLATED A1C: CPT

## 2023-05-11 PROCEDURE — 84100 ASSAY OF PHOSPHORUS: CPT

## 2023-05-11 PROCEDURE — 36415 COLL VENOUS BLD VENIPUNCTURE: CPT

## 2023-05-11 PROCEDURE — 1036F TOBACCO NON-USER: CPT | Performed by: INTERNAL MEDICINE

## 2023-05-11 PROCEDURE — 82043 UR ALBUMIN QUANTITATIVE: CPT

## 2023-05-11 PROCEDURE — 3078F DIAST BP <80 MM HG: CPT | Performed by: INTERNAL MEDICINE

## 2023-05-11 PROCEDURE — 85025 COMPLETE CBC W/AUTO DIFF WBC: CPT

## 2023-05-11 PROCEDURE — 82570 ASSAY OF URINE CREATININE: CPT

## 2023-05-11 PROCEDURE — 84156 ASSAY OF PROTEIN URINE: CPT

## 2023-05-11 PROCEDURE — 80053 COMPREHEN METABOLIC PANEL: CPT

## 2023-05-11 PROCEDURE — 1123F ACP DISCUSS/DSCN MKR DOCD: CPT | Performed by: INTERNAL MEDICINE

## 2023-05-11 PROCEDURE — G8427 DOCREV CUR MEDS BY ELIG CLIN: HCPCS | Performed by: INTERNAL MEDICINE

## 2023-05-11 PROCEDURE — 3074F SYST BP LT 130 MM HG: CPT | Performed by: INTERNAL MEDICINE

## 2023-05-11 PROCEDURE — 99214 OFFICE O/P EST MOD 30 MIN: CPT | Performed by: INTERNAL MEDICINE

## 2023-05-11 PROCEDURE — 83970 ASSAY OF PARATHORMONE: CPT

## 2023-05-11 PROCEDURE — G8419 CALC BMI OUT NRM PARAM NOF/U: HCPCS | Performed by: INTERNAL MEDICINE

## 2023-05-11 RX ORDER — AMOXICILLIN 250 MG
1 CAPSULE ORAL 2 TIMES DAILY
COMMUNITY
Start: 2023-01-10

## 2023-05-11 SDOH — ECONOMIC STABILITY: INCOME INSECURITY: HOW HARD IS IT FOR YOU TO PAY FOR THE VERY BASICS LIKE FOOD, HOUSING, MEDICAL CARE, AND HEATING?: SOMEWHAT HARD

## 2023-05-11 SDOH — ECONOMIC STABILITY: FOOD INSECURITY: WITHIN THE PAST 12 MONTHS, THE FOOD YOU BOUGHT JUST DIDN'T LAST AND YOU DIDN'T HAVE MONEY TO GET MORE.: NEVER TRUE

## 2023-05-11 SDOH — ECONOMIC STABILITY: HOUSING INSECURITY
IN THE LAST 12 MONTHS, WAS THERE A TIME WHEN YOU DID NOT HAVE A STEADY PLACE TO SLEEP OR SLEPT IN A SHELTER (INCLUDING NOW)?: NO

## 2023-05-11 SDOH — ECONOMIC STABILITY: FOOD INSECURITY: WITHIN THE PAST 12 MONTHS, YOU WORRIED THAT YOUR FOOD WOULD RUN OUT BEFORE YOU GOT MONEY TO BUY MORE.: SOMETIMES TRUE

## 2023-05-11 ASSESSMENT — PATIENT HEALTH QUESTIONNAIRE - PHQ9
SUM OF ALL RESPONSES TO PHQ QUESTIONS 1-9: 0
1. LITTLE INTEREST OR PLEASURE IN DOING THINGS: 0
2. FEELING DOWN, DEPRESSED OR HOPELESS: 0
SUM OF ALL RESPONSES TO PHQ QUESTIONS 1-9: 0
SUM OF ALL RESPONSES TO PHQ QUESTIONS 1-9: 0
SUM OF ALL RESPONSES TO PHQ9 QUESTIONS 1 & 2: 0
SUM OF ALL RESPONSES TO PHQ QUESTIONS 1-9: 0

## 2023-05-11 ASSESSMENT — ENCOUNTER SYMPTOMS
ABDOMINAL PAIN: 0
SHORTNESS OF BREATH: 0
COUGH: 0

## 2023-05-11 NOTE — PROGRESS NOTES
Trisha Servin is a 81 y.o. male (: 1940) presenting to address:    Chief Complaint   Patient presents with    Medication Check       Vitals:    23 1006   BP: 113/73   Pulse: 66   Resp: 17   Temp: (!) 96.4 °F (35.8 °C)   SpO2: 95%       Coordination of Care Questionaire:   1. \"Have you been to the ER, urgent care clinic since your last visit? Hospitalized since your last visit? \" No    2. \"Have you seen or consulted any other health care providers outside of the [address] [place] [address] since your last visit? \" Yes Endocrinology and Nephrology x 4-5weeks ago. 3. For patients aged 39-70: Has the patient had a colonoscopy / FIT/ Cologuard? NA - based on age      If the patient is female:    4. For patients aged 41-77: Has the patient had a mammogram within the past 2 years? NA - based on age or sex      11. For patients aged 21-65: Has the patient had a pap smear? NA - based on age or sex    Advanced Directive:   1. Do you have an Advanced Directive? No    2. Would you like information on Advanced Directives?  No

## 2023-05-11 NOTE — PROGRESS NOTES
HISTORY OF PRESENT ILLNESS  Jordan Lozano is a 81 y.o. male    HPI  DM, fairly controlled, glucose  in am, and around 140 after meals, on Ozempic/glucotrol/Toujeo/jardiance daily. Orthostatic hypotension, stable on Midodrine 5 mg po BID  HLD, stable on lipitor 40 mg daily  CKD, stage 3b, stable, last GFR was 39, followed by Nephrology  HTN, stable, bpis controlled on Bystolic 5 mg daily    Review of Systems   Constitutional:  Negative for fever. Respiratory:  Negative for cough and shortness of breath. Cardiovascular:  Negative for chest pain and palpitations. Gastrointestinal:  Negative for abdominal pain. Musculoskeletal:  Negative for myalgias. Neurological:  Negative for dizziness and headaches. Physical Exam  Vitals reviewed. Cardiovascular:      Rate and Rhythm: Normal rate and regular rhythm. Heart sounds: No murmur heard. Pulmonary:      Effort: Pulmonary effort is normal.      Breath sounds: Normal breath sounds. No rales. Abdominal:      Palpations: Abdomen is soft. Tenderness: There is no abdominal tenderness. Musculoskeletal:      Right lower leg: No edema. Left lower leg: No edema. Neurological:      Mental Status: He is oriented to person, place, and time. ASSESSMENT and PLAN    1. Type 2 diabetes mellitus with stage 3 chronic kidney disease, with long-term current use of insulin, unspecified whether stage 3a or 3b CKD ([facility] [address] controlled, continue Toujeo/jardiance/ozempic/glucotrol daily @ current dose  -     Hemoglobin A1C; Future  -     Comprehensive Metabolic Panel; Future  -     Microalbumin / Creatinine Urine Ratio; Future  -     CBC with Auto Differential; Future  2. Orthostatic hypotension, stable, continue midodrine @ current dose  3. Mixed hyperlipidemia, stable, continue lipitor 40 mg daily  -     Comprehensive Metabolic Panel; Future  -     CBC with Auto Differential; Future  -     Lipid Panel; Future  4.  Chronic kidney disease, stage 3b

## 2023-05-24 RX ORDER — TAMSULOSIN HYDROCHLORIDE 0.4 MG/1
0.4 CAPSULE ORAL DAILY
Qty: 90 CAPSULE | Refills: 3 | OUTPATIENT
Start: 2023-05-24

## 2023-07-17 ENCOUNTER — TELEPHONE (OUTPATIENT)
Dept: FAMILY MEDICINE CLINIC | Facility: CLINIC | Age: 83
End: 2023-07-17

## 2023-07-17 NOTE — TELEPHONE ENCOUNTER
Spoke woith pt's wife who stated pt took evening meds by accident and then pt had already taken the morning meds in the evening by the time I called back.

## 2023-07-17 NOTE — TELEPHONE ENCOUNTER
Pt's wife called on behalf of the pt wanting to know what the pt should do. Pt's wife stated that he mixed up his medications. Pt took his evening medications for this morning so he has not taken any morning medications. Pt's wife would like a call back. Please advise.  Pt's wife is aware that PCP is out of the office

## 2023-07-24 RX ORDER — ATORVASTATIN CALCIUM 40 MG/1
TABLET, FILM COATED ORAL
Qty: 90 TABLET | Refills: 3 | Status: SHIPPED | OUTPATIENT
Start: 2023-07-24

## 2023-07-24 NOTE — TELEPHONE ENCOUNTER
Nikunj Cummins called for their medication refill.     Last Office visit:  05/11/23    Last Filled: 02/28/23    Follow up visit:    Future Appointments   Date Time Provider [facility] [address]   11/14/2023  9:00 AM MD BARRIE Bonilla BS AMB

## 2023-08-07 RX ORDER — FUROSEMIDE 40 MG/1
TABLET ORAL
Qty: 90 TABLET | Refills: 3 | Status: SHIPPED | OUTPATIENT
Start: 2023-08-07

## 2023-08-15 RX ORDER — NEBIVOLOL 5 MG/1
TABLET ORAL
Qty: 90 TABLET | Refills: 3 | Status: SHIPPED | OUTPATIENT
Start: 2023-08-15

## 2023-08-15 NOTE — TELEPHONE ENCOUNTER
Last visit: 5/11/23  Next visit:   Future Appointments   Date Time Provider [address]   11/14/2023  9:00 AM Zakiya Wiggins MD BSMA BS AMB     Last filled: 9/18/22

## 2023-08-31 RX ORDER — TAMSULOSIN HYDROCHLORIDE 0.4 MG/1
0.4 CAPSULE ORAL DAILY
Qty: 90 CAPSULE | Refills: 3 | OUTPATIENT
Start: 2023-08-31

## 2023-09-06 RX ORDER — FLUTICASONE PROPIONATE 50 MCG
SPRAY, SUSPENSION (ML) NASAL
Qty: 3 EACH | Refills: 3 | Status: SHIPPED | OUTPATIENT
Start: 2023-09-06

## 2023-09-18 RX ORDER — GLIPIZIDE 2.5 MG/1
TABLET, EXTENDED RELEASE ORAL
Qty: 90 TABLET | Refills: 3 | Status: SHIPPED | OUTPATIENT
Start: 2023-09-18

## 2023-09-28 ENCOUNTER — OFFICE VISIT (OUTPATIENT)
Dept: FAMILY MEDICINE CLINIC | Facility: CLINIC | Age: 83
End: 2023-09-28
Payer: COMMERCIAL

## 2023-09-28 ENCOUNTER — HOSPITAL ENCOUNTER (OUTPATIENT)
Facility: HOSPITAL | Age: 83
Setting detail: SPECIMEN
Discharge: HOME | End: 2023-09-28
Payer: COMMERCIAL

## 2023-09-28 VITALS
HEIGHT: 68 IN | OXYGEN SATURATION: 92 % | BODY MASS INDEX: 24.8 KG/M2 | TEMPERATURE: 97.2 F | SYSTOLIC BLOOD PRESSURE: 124 MMHG | DIASTOLIC BLOOD PRESSURE: 74 MMHG | WEIGHT: 163.6 LBS | HEART RATE: 63 BPM | RESPIRATION RATE: 18 BRPM

## 2023-09-28 DIAGNOSIS — N18.30 TYPE 2 DIABETES MELLITUS WITH STAGE 3 CHRONIC KIDNEY DISEASE, WITH LONG-TERM CURRENT USE OF INSULIN, UNSPECIFIED WHETHER STAGE 3A OR 3B CKD (HCC): Primary | ICD-10-CM

## 2023-09-28 DIAGNOSIS — E78.2 MIXED HYPERLIPIDEMIA: ICD-10-CM

## 2023-09-28 DIAGNOSIS — Z23 NEED FOR VACCINATION: ICD-10-CM

## 2023-09-28 DIAGNOSIS — E11.22 TYPE 2 DIABETES MELLITUS WITH STAGE 3 CHRONIC KIDNEY DISEASE, WITH LONG-TERM CURRENT USE OF INSULIN, UNSPECIFIED WHETHER STAGE 3A OR 3B CKD (HCC): Primary | ICD-10-CM

## 2023-09-28 DIAGNOSIS — E11.22 TYPE 2 DIABETES MELLITUS WITH STAGE 3 CHRONIC KIDNEY DISEASE, WITH LONG-TERM CURRENT USE OF INSULIN, UNSPECIFIED WHETHER STAGE 3A OR 3B CKD (HCC): ICD-10-CM

## 2023-09-28 DIAGNOSIS — I10 ESSENTIAL (PRIMARY) HYPERTENSION: ICD-10-CM

## 2023-09-28 DIAGNOSIS — Z79.4 TYPE 2 DIABETES MELLITUS WITH STAGE 3 CHRONIC KIDNEY DISEASE, WITH LONG-TERM CURRENT USE OF INSULIN, UNSPECIFIED WHETHER STAGE 3A OR 3B CKD (HCC): ICD-10-CM

## 2023-09-28 DIAGNOSIS — N18.30 TYPE 2 DIABETES MELLITUS WITH STAGE 3 CHRONIC KIDNEY DISEASE, WITH LONG-TERM CURRENT USE OF INSULIN, UNSPECIFIED WHETHER STAGE 3A OR 3B CKD (HCC): ICD-10-CM

## 2023-09-28 DIAGNOSIS — Z79.4 TYPE 2 DIABETES MELLITUS WITH STAGE 3 CHRONIC KIDNEY DISEASE, WITH LONG-TERM CURRENT USE OF INSULIN, UNSPECIFIED WHETHER STAGE 3A OR 3B CKD (HCC): Primary | ICD-10-CM

## 2023-09-28 LAB
ALBUMIN SERPL-MCNC: 3.8 G/DL (ref 3.4–5)
ALBUMIN/GLOB SERPL: 1.1 (ref 0.8–1.7)
ALP SERPL-CCNC: 76 U/L (ref 45–117)
ALT SERPL-CCNC: 17 U/L (ref 16–61)
ANION GAP SERPL CALC-SCNC: 8 MMOL/L (ref 3–18)
AST SERPL-CCNC: 7 U/L (ref 10–38)
BILIRUB SERPL-MCNC: 1 MG/DL (ref 0.2–1)
BUN SERPL-MCNC: 48 MG/DL (ref 7–18)
BUN/CREAT SERPL: 18 (ref 12–20)
CALCIUM SERPL-MCNC: 9.4 MG/DL (ref 8.5–10.1)
CHLORIDE SERPL-SCNC: 104 MMOL/L (ref 100–111)
CHOLEST SERPL-MCNC: 110 MG/DL
CO2 SERPL-SCNC: 26 MMOL/L (ref 21–32)
CREAT SERPL-MCNC: 2.61 MG/DL (ref 0.6–1.3)
GLOBULIN SER CALC-MCNC: 3.5 G/DL (ref 2–4)
GLUCOSE SERPL-MCNC: 206 MG/DL (ref 74–99)
GLUCOSE, POC: 223 MG/DL
HBA1C MFR BLD: 7.4 %
HDLC SERPL-MCNC: 52 MG/DL (ref 40–60)
HDLC SERPL: 2.1 (ref 0–5)
LDLC SERPL CALC-MCNC: 44.2 MG/DL (ref 0–100)
LIPID PANEL: NORMAL
POTASSIUM SERPL-SCNC: 4.1 MMOL/L (ref 3.5–5.5)
PROT SERPL-MCNC: 7.3 G/DL (ref 6.4–8.2)
SODIUM SERPL-SCNC: 138 MMOL/L (ref 136–145)
TRIGL SERPL-MCNC: 69 MG/DL
VLDLC SERPL CALC-MCNC: 13.8 MG/DL

## 2023-09-28 PROCEDURE — 3051F HG A1C>EQUAL 7.0%<8.0%: CPT | Performed by: INTERNAL MEDICINE

## 2023-09-28 PROCEDURE — 1123F ACP DISCUSS/DSCN MKR DOCD: CPT | Performed by: INTERNAL MEDICINE

## 2023-09-28 PROCEDURE — G8427 DOCREV CUR MEDS BY ELIG CLIN: HCPCS | Performed by: INTERNAL MEDICINE

## 2023-09-28 PROCEDURE — 90471 IMMUNIZATION ADMIN: CPT | Performed by: INTERNAL MEDICINE

## 2023-09-28 PROCEDURE — 1036F TOBACCO NON-USER: CPT | Performed by: INTERNAL MEDICINE

## 2023-09-28 PROCEDURE — G8420 CALC BMI NORM PARAMETERS: HCPCS | Performed by: INTERNAL MEDICINE

## 2023-09-28 PROCEDURE — 36415 COLL VENOUS BLD VENIPUNCTURE: CPT

## 2023-09-28 PROCEDURE — 82962 GLUCOSE BLOOD TEST: CPT | Performed by: INTERNAL MEDICINE

## 2023-09-28 PROCEDURE — 80053 COMPREHEN METABOLIC PANEL: CPT

## 2023-09-28 PROCEDURE — 90694 VACC AIIV4 NO PRSRV 0.5ML IM: CPT | Performed by: INTERNAL MEDICINE

## 2023-09-28 PROCEDURE — 80061 LIPID PANEL: CPT

## 2023-09-28 PROCEDURE — 3078F DIAST BP <80 MM HG: CPT | Performed by: INTERNAL MEDICINE

## 2023-09-28 PROCEDURE — 83036 HEMOGLOBIN GLYCOSYLATED A1C: CPT | Performed by: INTERNAL MEDICINE

## 2023-09-28 PROCEDURE — 3074F SYST BP LT 130 MM HG: CPT | Performed by: INTERNAL MEDICINE

## 2023-09-28 PROCEDURE — 99214 OFFICE O/P EST MOD 30 MIN: CPT | Performed by: INTERNAL MEDICINE

## 2023-09-28 ASSESSMENT — ENCOUNTER SYMPTOMS
ABDOMINAL PAIN: 0
SHORTNESS OF BREATH: 0

## 2023-09-28 NOTE — PROGRESS NOTES
2023    Andrea Doss (:  1940) is a 81 y.o. male, here for a preventive medicine evaluation. Patient Active Problem List   Diagnosis    Class 1 obesity due to excess calories with serious comorbidity and body mass index (BMI) of 32.0 to 32.9 in adult    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 cholecystitis    RTA (renal tubular acidosis)    Adenomatous polyp of descending colon    Localized edema    B12 deficiency    Benign prostatic hyperplasia with lower urinary tract symptoms    CKD (chronic kidney disease) stage 3, GFR 30-59 ml/min ([facility])    Essential hypertension    Elevated PSA    Diabetes mellitus with stage 3 chronic kidney disease ([facility])       Review of Systems    Prior to Visit Medications    Medication Sig Taking?  Authorizing Provider   glipiZIDE (GLUCOTROL XL) 2.5 MG extended release tablet TAKE 1 TABLET BY MOUTH IN  THE MORNING  Estella Domingo MD   fluticasone (FLONASE) 50 MCG/ACT nasal spray USE 2 SPRAYS IN BOTH  NOSTRILS DAILY  Estella Domingo MD   nebivolol (BYSTOLIC) 5 MG tablet TAKE 1 TABLET BY MOUTH  DAILY  Estella Domingo MD   furosemide (LASIX) 40 MG tablet TAKE 1 TABLET BY MOUTH DAILY  Estella Domingo MD   atorvastatin (LIPITOR) 40 MG tablet TAKE 1 TABLET BY MOUTH AT  BEDTIME  Estella Domingo MD   Misc Natural Products (OSTEO BI-FLEX ADV JOINT SHIELD) TABS Take 1 tablet by mouth 2 times daily  Historical Provider, MD   vitamin E 400 UNIT capsule Take 1 capsule by mouth daily  Historical Provider, MD   senna-docusate (Theadore Awkward) 8.6-50 MG per tablet Take 1 tablet by mouth 2 times daily  Historical Provider, MD COMPA MACEDO SOLOSTAR 300 UNIT/ML SOPN Inject 15 Units into the skin at bedtime  Historical Provider, MD   midodrine (PROAMATINE) 5 MG tablet Take 1 tablet by mouth in the morning and at bedtime  Historical Provider, MD   tamsulosin (FLOMAX) 0.4 MG capsule Take 1 capsule by mouth daily  Estella Domingo, MD   empagliflozin (Demile Safer) 10

## 2023-09-28 NOTE — PROGRESS NOTES
HISTORY OF PRESENT ILLNESS  Rakan Tate is a 81 y.o. male    HPI  DM with stage 3b CKD, worsening, his A1c is 7.4 today, his morning glucose has been elevated around 250, and it is 223 today, he stopped his Toujeo insulin since it was causing hypoglycemia during the night, he is taking his jardiance/glipizide daily and Ozempic weekly, he lost 13 lbs since last visit  HTN, stable, bp is well controlled on Bystolic/lasix daily  HLD, controlled on lipitor daily  Followed by Nephrology for his CKD    Review of Systems   Constitutional:  Negative for fever. Respiratory:  Negative for shortness of breath. Cardiovascular:  Negative for chest pain and palpitations. Gastrointestinal:  Negative for abdominal pain. Musculoskeletal:  Negative for myalgias. Neurological:  Negative for dizziness and headaches. Physical Exam  Vitals reviewed. Cardiovascular:      Rate and Rhythm: Normal rate and regular rhythm. Heart sounds: No murmur heard. Pulmonary:      Effort: Pulmonary effort is normal.      Breath sounds: Normal breath sounds. No rales. Abdominal:      Palpations: Abdomen is soft. Tenderness: There is no abdominal tenderness. Musculoskeletal:      Right lower leg: No edema. Left lower leg: No edema. Neurological:      Mental Status: He is oriented to person, place, and time. ASSESSMENT and PLAN    1. Type 2 diabetes mellitus with stage 3 chronic kidney disease, with long-term current use of insulin, unspecified whether stage 3a or 3b CKD ([address]), worsening, will continue glipizide/jardiance, will increase ozempic dose to 1 mg weekly, and restart Toujeo @ 5 units daily, may increase the dose by 2 units every 2 days until morning glucose is 150 or less.   -     AMB POC GLUCOSE BLOOD, BY GLUCOSE MONITORING DEVICE  -     AMB POC HEMOGLOBIN A1C  -     Semaglutide, 1 MG/DOSE, 4 MG/3ML SOPN; Inject 1 mg into the skin every 7 days, Disp-9 mL, R-3Normal  -     Lipid Panel;

## 2023-10-12 RX ORDER — EMPAGLIFLOZIN 10 MG/1
10 TABLET, FILM COATED ORAL DAILY
Qty: 90 TABLET | Refills: 3 | Status: SHIPPED | OUTPATIENT
Start: 2023-10-12

## 2023-10-12 NOTE — TELEPHONE ENCOUNTER
Rakan Tate called for their medication refill.     Last Office visit:  9/28/2023    Last Filled: 4/16/2023; Qty 90 w/ 1 refill     Follow up visit:    Future Appointments   Date Time Provider [address] [place] [address]   12/7/2023  9:00 AM Gleacio Ramirez MD [facility]

## 2023-12-07 ENCOUNTER — OFFICE VISIT (OUTPATIENT)
Dept: FAMILY MEDICINE CLINIC | Facility: CLINIC | Age: 83
End: 2023-12-07
Payer: COMMERCIAL

## 2023-12-07 VITALS
OXYGEN SATURATION: 97 % | WEIGHT: 174.2 LBS | BODY MASS INDEX: 26.4 KG/M2 | HEART RATE: 69 BPM | HEIGHT: 68 IN | DIASTOLIC BLOOD PRESSURE: 78 MMHG | RESPIRATION RATE: 18 BRPM | TEMPERATURE: 98 F | SYSTOLIC BLOOD PRESSURE: 126 MMHG

## 2023-12-07 DIAGNOSIS — Z00.00 MEDICARE ANNUAL WELLNESS VISIT, SUBSEQUENT: Primary | ICD-10-CM

## 2023-12-07 PROCEDURE — 1123F ACP DISCUSS/DSCN MKR DOCD: CPT | Performed by: INTERNAL MEDICINE

## 2023-12-07 PROCEDURE — G0439 PPPS, SUBSEQ VISIT: HCPCS | Performed by: INTERNAL MEDICINE

## 2023-12-07 PROCEDURE — 3074F SYST BP LT 130 MM HG: CPT | Performed by: INTERNAL MEDICINE

## 2023-12-07 PROCEDURE — 3078F DIAST BP <80 MM HG: CPT | Performed by: INTERNAL MEDICINE

## 2023-12-07 PROCEDURE — G8484 FLU IMMUNIZE NO ADMIN: HCPCS | Performed by: INTERNAL MEDICINE

## 2023-12-07 ASSESSMENT — PATIENT HEALTH QUESTIONNAIRE - PHQ9
SUM OF ALL RESPONSES TO PHQ9 QUESTIONS 1 & 2: 0
SUM OF ALL RESPONSES TO PHQ QUESTIONS 1-9: 0
SUM OF ALL RESPONSES TO PHQ QUESTIONS 1-9: 0
2. FEELING DOWN, DEPRESSED OR HOPELESS: 0
SUM OF ALL RESPONSES TO PHQ QUESTIONS 1-9: 0
1. LITTLE INTEREST OR PLEASURE IN DOING THINGS: 0
SUM OF ALL RESPONSES TO PHQ QUESTIONS 1-9: 0

## 2023-12-07 ASSESSMENT — LIFESTYLE VARIABLES
HOW MANY STANDARD DRINKS CONTAINING ALCOHOL DO YOU HAVE ON A TYPICAL DAY: PATIENT DOES NOT DRINK
HOW OFTEN DO YOU HAVE A DRINK CONTAINING ALCOHOL: NEVER

## 2024-01-25 NOTE — TELEPHONE ENCOUNTER
Pt's wife called with Rx Refill Request.  Requested Prescriptions     Pending Prescriptions Disp Refills    tamsulosin (FLOMAX) 0.4 MG capsule 90 capsule 1     Sig: Take 1 capsule by mouth daily

## 2024-01-26 RX ORDER — TAMSULOSIN HYDROCHLORIDE 0.4 MG/1
0.4 CAPSULE ORAL EVERY EVENING
Qty: 90 CAPSULE | Refills: 1 | Status: SHIPPED | OUTPATIENT
Start: 2024-01-26

## 2024-01-26 NOTE — TELEPHONE ENCOUNTER
Mahamed Troy called for their medication refill.    Last Office visit:  12/072023    Last Filled: 04/16/2023      tamsulosin (FLOMAX) 0.4 MG capsule     Follow up visit:    Future Appointments   Date Time Provider Department Center   3/27/2024  8:30 AM Mahamed Ramirez MD [facility]     \

## 2024-03-05 RX ORDER — FUROSEMIDE 20 MG/1
20 TABLET ORAL DAILY
Qty: 90 TABLET | Refills: 3 | Status: SHIPPED | OUTPATIENT
Start: 2024-03-05

## 2024-03-27 ENCOUNTER — OFFICE VISIT (OUTPATIENT)
Dept: FAMILY MEDICINE CLINIC | Facility: CLINIC | Age: 84
End: 2024-03-27
Payer: COMMERCIAL

## 2024-03-27 ENCOUNTER — HOSPITAL ENCOUNTER (OUTPATIENT)
Facility: HOSPITAL | Age: 84
Setting detail: SPECIMEN
Discharge: HOME | End: 2024-03-30
Payer: COMMERCIAL

## 2024-03-27 VITALS
RESPIRATION RATE: 18 BRPM | HEIGHT: 68 IN | BODY MASS INDEX: 27.13 KG/M2 | HEART RATE: 69 BPM | SYSTOLIC BLOOD PRESSURE: 103 MMHG | TEMPERATURE: 97.8 F | DIASTOLIC BLOOD PRESSURE: 69 MMHG | OXYGEN SATURATION: 95 % | WEIGHT: 179 LBS

## 2024-03-27 DIAGNOSIS — M25.541 JOINT PAIN IN BOTH HANDS: ICD-10-CM

## 2024-03-27 DIAGNOSIS — E11.22 TYPE 2 DIABETES MELLITUS WITH STAGE 3 CHRONIC KIDNEY DISEASE, WITH LONG-TERM CURRENT USE OF INSULIN, UNSPECIFIED WHETHER STAGE 3A OR 3B CKD (HCC): ICD-10-CM

## 2024-03-27 DIAGNOSIS — N18.32 CHRONIC KIDNEY DISEASE, STAGE 3B (HCC): ICD-10-CM

## 2024-03-27 DIAGNOSIS — M25.542 JOINT PAIN IN BOTH HANDS: ICD-10-CM

## 2024-03-27 DIAGNOSIS — N40.1 BENIGN PROSTATIC HYPERPLASIA WITH LOWER URINARY TRACT SYMPTOMS, SYMPTOM DETAILS UNSPECIFIED: ICD-10-CM

## 2024-03-27 DIAGNOSIS — E78.2 MIXED HYPERLIPIDEMIA: Primary | ICD-10-CM

## 2024-03-27 DIAGNOSIS — E78.2 MIXED HYPERLIPIDEMIA: ICD-10-CM

## 2024-03-27 DIAGNOSIS — N18.30 TYPE 2 DIABETES MELLITUS WITH STAGE 3 CHRONIC KIDNEY DISEASE, WITH LONG-TERM CURRENT USE OF INSULIN, UNSPECIFIED WHETHER STAGE 3A OR 3B CKD (HCC): ICD-10-CM

## 2024-03-27 DIAGNOSIS — I10 ESSENTIAL (PRIMARY) HYPERTENSION: ICD-10-CM

## 2024-03-27 DIAGNOSIS — Z79.4 TYPE 2 DIABETES MELLITUS WITH STAGE 3 CHRONIC KIDNEY DISEASE, WITH LONG-TERM CURRENT USE OF INSULIN, UNSPECIFIED WHETHER STAGE 3A OR 3B CKD (HCC): ICD-10-CM

## 2024-03-27 LAB
ALBUMIN SERPL-MCNC: 3.6 G/DL (ref 3.4–5)
ALBUMIN/GLOB SERPL: 1 (ref 0.8–1.7)
ALP SERPL-CCNC: 100 U/L (ref 45–117)
ALT SERPL-CCNC: 15 U/L (ref 16–61)
AST SERPL-CCNC: 11 U/L (ref 10–38)
BILIRUB DIRECT SERPL-MCNC: 0.2 MG/DL (ref 0–0.2)
BILIRUB SERPL-MCNC: 0.8 MG/DL (ref 0.2–1)
CHOLEST SERPL-MCNC: 106 MG/DL
GLOBULIN SER CALC-MCNC: 3.6 G/DL (ref 2–4)
HDLC SERPL-MCNC: 46 MG/DL (ref 40–60)
HDLC SERPL: 2.3 (ref 0–5)
LDLC SERPL CALC-MCNC: 45.6 MG/DL (ref 0–100)
LIPID PANEL: NORMAL
PROT SERPL-MCNC: 7.2 G/DL (ref 6.4–8.2)
TRIGL SERPL-MCNC: 72 MG/DL
VLDLC SERPL CALC-MCNC: 14.4 MG/DL

## 2024-03-27 PROCEDURE — G8484 FLU IMMUNIZE NO ADMIN: HCPCS | Performed by: INTERNAL MEDICINE

## 2024-03-27 PROCEDURE — 99214 OFFICE O/P EST MOD 30 MIN: CPT | Performed by: INTERNAL MEDICINE

## 2024-03-27 PROCEDURE — 80061 LIPID PANEL: CPT

## 2024-03-27 PROCEDURE — G8419 CALC BMI OUT NRM PARAM NOF/U: HCPCS | Performed by: INTERNAL MEDICINE

## 2024-03-27 PROCEDURE — 3078F DIAST BP <80 MM HG: CPT | Performed by: INTERNAL MEDICINE

## 2024-03-27 PROCEDURE — 80076 HEPATIC FUNCTION PANEL: CPT

## 2024-03-27 PROCEDURE — 36415 COLL VENOUS BLD VENIPUNCTURE: CPT

## 2024-03-27 PROCEDURE — 3074F SYST BP LT 130 MM HG: CPT | Performed by: INTERNAL MEDICINE

## 2024-03-27 PROCEDURE — G8427 DOCREV CUR MEDS BY ELIG CLIN: HCPCS | Performed by: INTERNAL MEDICINE

## 2024-03-27 PROCEDURE — 1036F TOBACCO NON-USER: CPT | Performed by: INTERNAL MEDICINE

## 2024-03-27 PROCEDURE — 1123F ACP DISCUSS/DSCN MKR DOCD: CPT | Performed by: INTERNAL MEDICINE

## 2024-03-27 RX ORDER — BLOOD-GLUCOSE SENSOR
EACH MISCELLANEOUS
COMMUNITY
Start: 2024-03-07

## 2024-03-27 ASSESSMENT — PATIENT HEALTH QUESTIONNAIRE - PHQ9
SUM OF ALL RESPONSES TO PHQ QUESTIONS 1-9: 0
SUM OF ALL RESPONSES TO PHQ9 QUESTIONS 1 & 2: 0
SUM OF ALL RESPONSES TO PHQ QUESTIONS 1-9: 0
2. FEELING DOWN, DEPRESSED OR HOPELESS: NOT AT ALL
1. LITTLE INTEREST OR PLEASURE IN DOING THINGS: NOT AT ALL

## 2024-03-27 ASSESSMENT — ENCOUNTER SYMPTOMS
ABDOMINAL PAIN: 0
SHORTNESS OF BREATH: 0

## 2024-03-27 NOTE — PROGRESS NOTES
Mahamed Troy is a 83 y.o. male (: 1940) presenting to address:    Chief Complaint   Patient presents with    Medication Check       Vitals:    24 0833   BP: 103/69   Pulse: 69   Resp: 18   Temp: 97.8 °F (36.6 °C)   SpO2: 95%       \"Have you been to the ER, urgent care clinic since your last visit?  Hospitalized since your last visit?\"    NO    “Have you seen or consulted any other health care providers outside of [facility] since your last visit?”    YES - When: approximately 3  weeks ago.  Where and Why: Dr. Ring follow up after surgery on fistula..           
mg daily.  4. Joint pain in both hands, not able to use any NSAIDs due to his CKD most likely he will benefit from steroid injections.  -     External Referral To Orthopedic Surgery  5. Type 2 diabetes mellitus with stage 3 chronic kidney disease, with long-term current use of insulin, unspecified whether stage 3a or 3b CKD ([facility]), stable followed by endocrinology  6. Chronic kidney disease, stage 3b (HCC), stable followed by nephrology     Hgb A1C With Est Avg Glucose  Order: [id number]  Component  Ref Range & Units 3/21/24 1318   Hemoglobin A1c  4.8 - 5.6 % 7.8 High    Estimated Average Glucose  91 - 123 mg/dL 177 High    Resulting Agency [facility]     Specimen Collected: 03/21/24 13:18    Performed by: [facility] Last Resulted: 03/21/24 21:59     Basic Metabolic Panel  Order: [id number]  Component  Ref Range & Units 3/21/24 1318   Potassium  3.5 - 5.5 mmol/L 5.1   Sodium  133 - 145 mmol/L 141   Chloride  98 - 110 mmol/L 107   Glucose  70 - 99 mg/dL 103 High    Calcium  8.4 - 10.5 mg/dL 9.6   BUN  6 - 22 mg/dL 41 High    Creatinine  0.8 - 1.6 mg/dL 2.0 High    CO2  20 - 32 mmol/L 19 Low    eGFR  >60.0 mL/min/1.73 sq.m. 32.4 Low    Comment: eGFR calculation based on the Chronic Kidney Disease Epidemiology Collaboration (CKD-EPI) equation refit without adjustment for race.     This eGFR is validated for stable chronic renal failure patients. This equation is unreliable in acute illness or patients with normal renal function.   Anion Gap  3.0 - 15.0 mmol/L 15.0   Comment: Anion Gap calculation based on electrolyte reference ranges.   Resulting Agency [facility]     Specimen Collected: 03/21/24 13:18    Performed by: [facility] Last Resulted: 03/21/24 15:58     Return in about 6 months (around 9/27/2024) for Medicare wellness visit..

## 2024-04-07 RX ORDER — TAMSULOSIN HYDROCHLORIDE 0.4 MG/1
0.4 CAPSULE ORAL EVERY EVENING
Qty: 100 CAPSULE | Refills: 2 | Status: SHIPPED | OUTPATIENT
Start: 2024-04-07

## 2024-04-09 RX ORDER — ATORVASTATIN CALCIUM 40 MG/1
TABLET, FILM COATED ORAL
Qty: 100 TABLET | Refills: 2 | Status: SHIPPED | OUTPATIENT
Start: 2024-04-09

## 2024-04-24 RX ORDER — NEBIVOLOL 5 MG/1
TABLET ORAL
Qty: 100 TABLET | Refills: 2 | Status: SHIPPED | OUTPATIENT
Start: 2024-04-24

## 2024-05-07 ENCOUNTER — TELEPHONE (OUTPATIENT)
Dept: FAMILY MEDICINE CLINIC | Facility: CLINIC | Age: 84
End: 2024-05-07

## 2024-05-07 NOTE — TELEPHONE ENCOUNTER
Spoke with pts wife, she stated his hands are swollen they just got back from vacation have not scheduled with Ortho yet, gave her ortho contact information and told her to call them and get an appointment to get scheduled. She asked since he is referral to otho surgeon does that mean he is going to have to get surgery told her that I dont know what they do in ortho I know they have to be accessed by the doctor and then they will give them a plan from there.

## 2024-06-11 NOTE — PATIENT DISCUSSION
Drooping of upper lids reviewed with patient. They elect at this time not to proceed with a referral to ocuplastics. Recommend they evaluate their vision as it may decrease if no treatment is pursued. unspecified lump in the left breast, see HPI

## 2024-06-12 RX ORDER — GLIPIZIDE 2.5 MG/1
TABLET, EXTENDED RELEASE ORAL
Qty: 100 TABLET | Refills: 2 | Status: SHIPPED | OUTPATIENT
Start: 2024-06-12

## 2024-08-02 ENCOUNTER — COMPREHENSIVE EXAM (OUTPATIENT)
Dept: URBAN - METROPOLITAN AREA CLINIC 1 | Facility: CLINIC | Age: 84
End: 2024-08-02

## 2024-08-02 DIAGNOSIS — H01.024: ICD-10-CM

## 2024-08-02 DIAGNOSIS — Z79.4: ICD-10-CM

## 2024-08-02 DIAGNOSIS — Z96.1: ICD-10-CM

## 2024-08-02 DIAGNOSIS — E11.9: ICD-10-CM

## 2024-08-02 DIAGNOSIS — H40.013: ICD-10-CM

## 2024-08-02 DIAGNOSIS — H35.371: ICD-10-CM

## 2024-08-02 DIAGNOSIS — H16.143: ICD-10-CM

## 2024-08-02 DIAGNOSIS — H04.123: ICD-10-CM

## 2024-08-02 DIAGNOSIS — H01.021: ICD-10-CM

## 2024-08-02 PROCEDURE — 92015 DETERMINE REFRACTIVE STATE: CPT

## 2024-08-02 PROCEDURE — 99214 OFFICE O/P EST MOD 30 MIN: CPT

## 2024-08-02 ASSESSMENT — TONOMETRY
OS_IOP_MMHG: 14
OD_IOP_MMHG: 14

## 2024-08-02 ASSESSMENT — VISUAL ACUITY
OS_CC: 20/20
OU_CC: 20/20
OD_CC: 20/20-1

## 2024-09-21 DIAGNOSIS — E11.22 TYPE 2 DIABETES MELLITUS WITH STAGE 3 CHRONIC KIDNEY DISEASE, WITH LONG-TERM CURRENT USE OF INSULIN, UNSPECIFIED WHETHER STAGE 3A OR 3B CKD (HCC): ICD-10-CM

## 2024-09-21 DIAGNOSIS — Z79.4 TYPE 2 DIABETES MELLITUS WITH STAGE 3 CHRONIC KIDNEY DISEASE, WITH LONG-TERM CURRENT USE OF INSULIN, UNSPECIFIED WHETHER STAGE 3A OR 3B CKD (HCC): ICD-10-CM

## 2024-09-21 DIAGNOSIS — N18.30 TYPE 2 DIABETES MELLITUS WITH STAGE 3 CHRONIC KIDNEY DISEASE, WITH LONG-TERM CURRENT USE OF INSULIN, UNSPECIFIED WHETHER STAGE 3A OR 3B CKD (HCC): ICD-10-CM

## 2024-09-23 RX ORDER — SEMAGLUTIDE 1.34 MG/ML
INJECTION, SOLUTION SUBCUTANEOUS
Qty: 9 ML | Refills: 3 | Status: SHIPPED | OUTPATIENT
Start: 2024-09-23

## 2024-10-03 ENCOUNTER — OFFICE VISIT (OUTPATIENT)
Dept: FAMILY MEDICINE CLINIC | Facility: CLINIC | Age: 84
End: 2024-10-03
Payer: MEDICARE

## 2024-10-03 ENCOUNTER — HOSPITAL ENCOUNTER (OUTPATIENT)
Facility: HOSPITAL | Age: 84
Setting detail: SPECIMEN
Discharge: HOME | End: 2024-10-06
Payer: MEDICARE

## 2024-10-03 VITALS
SYSTOLIC BLOOD PRESSURE: 136 MMHG | OXYGEN SATURATION: 96 % | HEART RATE: 67 BPM | DIASTOLIC BLOOD PRESSURE: 81 MMHG | WEIGHT: 170.02 LBS | HEIGHT: 68 IN | BODY MASS INDEX: 25.77 KG/M2 | TEMPERATURE: 98.2 F | RESPIRATION RATE: 16 BRPM

## 2024-10-03 DIAGNOSIS — Z23 NEED FOR VACCINATION: ICD-10-CM

## 2024-10-03 DIAGNOSIS — I10 ESSENTIAL (PRIMARY) HYPERTENSION: ICD-10-CM

## 2024-10-03 DIAGNOSIS — R97.20 ELEVATED PSA: ICD-10-CM

## 2024-10-03 DIAGNOSIS — Z00.00 MEDICARE ANNUAL WELLNESS VISIT, SUBSEQUENT: Primary | ICD-10-CM

## 2024-10-03 DIAGNOSIS — E78.2 MIXED HYPERLIPIDEMIA: ICD-10-CM

## 2024-10-03 DIAGNOSIS — N40.1 BENIGN PROSTATIC HYPERPLASIA WITH LOWER URINARY TRACT SYMPTOMS, SYMPTOM DETAILS UNSPECIFIED: ICD-10-CM

## 2024-10-03 LAB
ALBUMIN SERPL-MCNC: 3.7 G/DL (ref 3.4–5)
ALBUMIN/GLOB SERPL: 1.2 (ref 0.8–1.7)
ALP SERPL-CCNC: 86 U/L (ref 45–117)
ALT SERPL-CCNC: 15 U/L (ref 16–61)
ANION GAP SERPL CALC-SCNC: 7 MMOL/L (ref 3–18)
AST SERPL-CCNC: 10 U/L (ref 10–38)
BILIRUB SERPL-MCNC: 0.8 MG/DL (ref 0.2–1)
BUN SERPL-MCNC: 39 MG/DL (ref 7–18)
BUN/CREAT SERPL: 16 (ref 12–20)
CALCIUM SERPL-MCNC: 9.4 MG/DL (ref 8.5–10.1)
CHLORIDE SERPL-SCNC: 108 MMOL/L (ref 100–111)
CHOLEST SERPL-MCNC: 104 MG/DL
CO2 SERPL-SCNC: 23 MMOL/L (ref 21–32)
CREAT SERPL-MCNC: 2.48 MG/DL (ref 0.6–1.3)
GLOBULIN SER CALC-MCNC: 3.1 G/DL (ref 2–4)
GLUCOSE SERPL-MCNC: 165 MG/DL (ref 74–99)
HDLC SERPL-MCNC: 49 MG/DL (ref 40–60)
HDLC SERPL: 2.1 (ref 0–5)
LDLC SERPL CALC-MCNC: 42.4 MG/DL (ref 0–100)
LIPID PANEL: NORMAL
POTASSIUM SERPL-SCNC: 4.6 MMOL/L (ref 3.5–5.5)
PROT SERPL-MCNC: 6.8 G/DL (ref 6.4–8.2)
PSA SERPL-MCNC: 9.4 NG/ML (ref 0–4)
SODIUM SERPL-SCNC: 138 MMOL/L (ref 136–145)
TRIGL SERPL-MCNC: 63 MG/DL
VLDLC SERPL CALC-MCNC: 12.6 MG/DL

## 2024-10-03 PROCEDURE — G0008 ADMIN INFLUENZA VIRUS VAC: HCPCS | Performed by: INTERNAL MEDICINE

## 2024-10-03 PROCEDURE — G0439 PPPS, SUBSEQ VISIT: HCPCS | Performed by: INTERNAL MEDICINE

## 2024-10-03 PROCEDURE — 99214 OFFICE O/P EST MOD 30 MIN: CPT | Performed by: INTERNAL MEDICINE

## 2024-10-03 PROCEDURE — 90653 IIV ADJUVANT VACCINE IM: CPT | Performed by: INTERNAL MEDICINE

## 2024-10-03 PROCEDURE — 80053 COMPREHEN METABOLIC PANEL: CPT

## 2024-10-03 PROCEDURE — 36415 COLL VENOUS BLD VENIPUNCTURE: CPT

## 2024-10-03 PROCEDURE — 3075F SYST BP GE 130 - 139MM HG: CPT | Performed by: INTERNAL MEDICINE

## 2024-10-03 PROCEDURE — 1123F ACP DISCUSS/DSCN MKR DOCD: CPT | Performed by: INTERNAL MEDICINE

## 2024-10-03 PROCEDURE — 84153 ASSAY OF PSA TOTAL: CPT

## 2024-10-03 PROCEDURE — 80061 LIPID PANEL: CPT

## 2024-10-03 PROCEDURE — 3079F DIAST BP 80-89 MM HG: CPT | Performed by: INTERNAL MEDICINE

## 2024-10-03 SDOH — ECONOMIC STABILITY: FOOD INSECURITY: WITHIN THE PAST 12 MONTHS, YOU WORRIED THAT YOUR FOOD WOULD RUN OUT BEFORE YOU GOT MONEY TO BUY MORE.: NEVER TRUE

## 2024-10-03 SDOH — ECONOMIC STABILITY: FOOD INSECURITY: WITHIN THE PAST 12 MONTHS, THE FOOD YOU BOUGHT JUST DIDN'T LAST AND YOU DIDN'T HAVE MONEY TO GET MORE.: NEVER TRUE

## 2024-10-03 SDOH — ECONOMIC STABILITY: INCOME INSECURITY: HOW HARD IS IT FOR YOU TO PAY FOR THE VERY BASICS LIKE FOOD, HOUSING, MEDICAL CARE, AND HEATING?: NOT HARD AT ALL

## 2024-10-03 ASSESSMENT — PATIENT HEALTH QUESTIONNAIRE - PHQ9
2. FEELING DOWN, DEPRESSED OR HOPELESS: NOT AT ALL
SUM OF ALL RESPONSES TO PHQ QUESTIONS 1-9: 0
1. LITTLE INTEREST OR PLEASURE IN DOING THINGS: NOT AT ALL
SUM OF ALL RESPONSES TO PHQ QUESTIONS 1-9: 0
SUM OF ALL RESPONSES TO PHQ9 QUESTIONS 1 & 2: 0

## 2024-10-03 ASSESSMENT — ENCOUNTER SYMPTOMS
COUGH: 0
ABDOMINAL PAIN: 0
SHORTNESS OF BREATH: 0

## 2024-10-03 ASSESSMENT — LIFESTYLE VARIABLES
HOW OFTEN DO YOU HAVE A DRINK CONTAINING ALCOHOL: NEVER
HOW MANY STANDARD DRINKS CONTAINING ALCOHOL DO YOU HAVE ON A TYPICAL DAY: PATIENT DOES NOT DRINK

## 2024-10-03 NOTE — PROGRESS NOTES
HISTORY OF PRESENT ILLNESS  Mahamed Troy is a 84 y.o. male    HPI  Hyperlipidemia, stable, on Lipitor 40 mg daily.  Hypertension, stable, on Bystolic and Lasix daily.  BPH, symptoms are fairly controlled on Flomax 0.4 mg daily.  History of elevated PSA, last PSA was 5, patient wanted a repeat PSA done today, discussed guidelines regarding monitoring PSA over age 75, patient still wanted PSA repeated today.  Followed by endocrinology for his diabetes, last A1c was 7.7.    Review of Systems   Constitutional:  Negative for fever.   Respiratory:  Negative for cough and shortness of breath.    Cardiovascular:  Negative for chest pain and palpitations.   Gastrointestinal:  Negative for abdominal pain.   Genitourinary:  Negative for frequency.   Musculoskeletal:  Negative for myalgias.   Neurological:  Negative for headaches.         Physical Exam  Vitals reviewed.   Cardiovascular:      Rate and Rhythm: Normal rate and regular rhythm.      Heart sounds: No murmur heard.  Pulmonary:      Effort: Pulmonary effort is normal.      Breath sounds: Normal breath sounds. No rales.   Abdominal:      Palpations: Abdomen is soft.      Tenderness: There is no abdominal tenderness.   Musculoskeletal:      Right lower leg: No edema.      Left lower leg: No edema.   Neurological:      Mental Status: He is oriented to person, place, and time.          ASSESSMENT and PLAN    1. Medicare annual wellness visit, subsequent  2. Need for vaccination  -     Influenza, FLUAD Trivalent, (age 65 y+), IM, Preservative Free, 0.5mL  3. Mixed hyperlipidemia,, stable, continue Lipitor 40 mg daily  -     Lipid Panel; Future  -     Comprehensive Metabolic Panel; Future  4. Essential (primary) hypertension, stable, continue Bystolic and Lasix at current doses  -     Lipid Panel; Future  -     Comprehensive Metabolic Panel; Future  5. Benign prostatic hyperplasia with lower urinary tract symptoms, symptom details unspecified, stable, continue Flomax 0.4 
Mahamed Troy is a 84 y.o. male (: 1940) presenting to address:    Chief Complaint   Patient presents with    Medicare AWV       Vitals:    10/03/24 0916   BP: 136/81   Pulse: 67   Resp: 16   Temp: 98.2 °F (36.8 °C)   SpO2: 96%       \"Have you been to the ER, urgent care clinic since your last visit?  Hospitalized since your last visit?\"    NO    “Have you seen or consulted any other health care providers outside of [facility] since your last visit?”    YES - When: approximately 3 months ago.  Where and Why: nephrology .           
Ar   Quercetin 500 MG CAPS Take 500 mg by mouth daily Yes Automatic Reconciliation, Ar   ascorbic acid (VITAMIN C) 500 MG tablet Take 1 tablet by mouth daily Yes Automatic Reconciliation, Ar   aspirin 81 MG chewable tablet Take 1 tablet by mouth daily Yes Automatic Reconciliation, Ar   vitamin D 25 MCG (1000 UT) CAPS Take by mouth daily Yes Automatic Reconciliation, Ar   cyanocobalamin 1000 MCG tablet Take 1 tablet by mouth Once a week at 5 PM Yes Automatic Reconciliation, Ar   triamcinolone (KENALOG) 0.1 % cream Apply topically 2 times daily as needed Yes Automatic Reconciliation, Ar   midodrine (PROAMATINE) 5 MG tablet Take 1 tablet by mouth in the morning and at bedtime  Provider, MD Phillip Chappell (Including outside providers/suppliers regularly involved in providing care):   Patient Care Team:  Mahamed Ramirez MD as PCP - General  Mahamed Ramirez MD as PCP - Empaneled Provider  April Fenton MD (Nephrology)  Ascencion Francois MD (Endocrinology)      Reviewed and updated this visit:  Tobacco  Allergies  Meds  Problems  Med Hx  Surg Hx  Soc Hx  Fam Hx

## 2024-10-03 NOTE — PATIENT INSTRUCTIONS
include:    Chest pain or pressure, or a strange feeling in the chest.     Sweating.     Shortness of breath.     Pain, pressure, or a strange feeling in the back, neck, jaw, or upper belly or in one or both shoulders or arms.     Lightheadedness or sudden weakness.     A fast or irregular heartbeat.   After you call 911, the  may tell you to chew 1 adult-strength or 2 to 4 low-dose aspirin. Wait for an ambulance. Do not try to drive yourself.  Watch closely for changes in your health, and be sure to contact your doctor if you have any problems.  Where can you learn more?  Go to https://www.[employer].net/patientEd and enter F075 to learn more about \"A Healthy Heart: Care Instructions.\"  Current as of: June 24, 2023  Content Version: 14.2  © 2024 [employer].   Care instructions adapted under license by [employer]. If you have questions about a medical condition or this instruction, always ask your healthcare professional. [employer] disclaims any warranty or liability for your use of this information.      Personalized Preventive Plan for Mahamed Troy - 10/3/2024  Medicare offers a range of preventive health benefits. Some of the tests and screenings are paid in full while other may be subject to a deductible, co-insurance, and/or copay.    Some of these benefits include a comprehensive review of your medical history including lifestyle, illnesses that may run in your family, and various assessments and screenings as appropriate.    After reviewing your medical record and screening and assessments performed today your provider may have ordered immunizations, labs, imaging, and/or referrals for you.  A list of these orders (if applicable) as well as your Preventive Care list are included within your After Visit Summary for your review.    Other Preventive Recommendations:    A preventive eye exam performed by an eye specialist is recommended every 1-2 years to screen for

## 2024-10-06 DIAGNOSIS — R97.20 ELEVATED PSA: Primary | ICD-10-CM

## 2024-10-06 NOTE — PATIENT INSTRUCTIONS
Restart Toujeo @ 5 units daily, may increase the dose by 2 units every 2 days until your fasting glucose is 150 or less.
OTIS Calderón MD

## 2024-11-07 LAB
ALBUMIN/CREATININE RATIO, EXTERNAL: 1692
CREATININE, EXTERNAL: 1.7
HBA1C MFR BLD HPLC: 7.4 %

## 2024-11-07 RX ORDER — TAMSULOSIN HYDROCHLORIDE 0.4 MG/1
0.4 CAPSULE ORAL EVERY EVENING
Qty: 100 CAPSULE | Refills: 2 | Status: SHIPPED | OUTPATIENT
Start: 2024-11-07

## 2024-11-07 RX ORDER — ATORVASTATIN CALCIUM 40 MG/1
TABLET, FILM COATED ORAL
Qty: 100 TABLET | Refills: 2 | Status: SHIPPED | OUTPATIENT
Start: 2024-11-07

## 2024-11-13 RX ORDER — FUROSEMIDE 20 MG/1
20 TABLET ORAL DAILY
Qty: 100 TABLET | Refills: 2 | Status: SHIPPED | OUTPATIENT
Start: 2024-11-13

## 2025-01-13 RX ORDER — NEBIVOLOL 5 MG/1
TABLET ORAL
Qty: 100 TABLET | Refills: 2 | Status: SHIPPED | OUTPATIENT
Start: 2025-01-13

## 2025-01-20 DIAGNOSIS — N18.30 TYPE 2 DIABETES MELLITUS WITH STAGE 3 CHRONIC KIDNEY DISEASE, WITH LONG-TERM CURRENT USE OF INSULIN, UNSPECIFIED WHETHER STAGE 3A OR 3B CKD (HCC): Primary | ICD-10-CM

## 2025-01-20 DIAGNOSIS — E11.22 TYPE 2 DIABETES MELLITUS WITH STAGE 3 CHRONIC KIDNEY DISEASE, WITH LONG-TERM CURRENT USE OF INSULIN, UNSPECIFIED WHETHER STAGE 3A OR 3B CKD (HCC): Primary | ICD-10-CM

## 2025-01-20 DIAGNOSIS — Z79.4 TYPE 2 DIABETES MELLITUS WITH STAGE 3 CHRONIC KIDNEY DISEASE, WITH LONG-TERM CURRENT USE OF INSULIN, UNSPECIFIED WHETHER STAGE 3A OR 3B CKD (HCC): Primary | ICD-10-CM

## 2025-01-20 RX ORDER — NEBIVOLOL 5 MG/1
5 TABLET ORAL DAILY
Qty: 100 TABLET | Refills: 2 | Status: SHIPPED | OUTPATIENT
Start: 2025-01-20

## 2025-01-20 RX ORDER — ACYCLOVIR 400 MG/1
TABLET ORAL
Qty: 1 EACH | Refills: 0 | Status: SHIPPED | OUTPATIENT
Start: 2025-01-20

## 2025-01-20 RX ORDER — GLIPIZIDE 2.5 MG/1
2.5 TABLET, EXTENDED RELEASE ORAL EVERY MORNING
Qty: 100 TABLET | Refills: 2 | Status: SHIPPED | OUTPATIENT
Start: 2025-01-20

## 2025-01-20 RX ORDER — TRIAMCINOLONE ACETONIDE 1 MG/G
CREAM TOPICAL 2 TIMES DAILY PRN
Qty: 1 EACH | Refills: 3 | Status: SHIPPED | OUTPATIENT
Start: 2025-01-20

## 2025-01-20 NOTE — TELEPHONE ENCOUNTER
Received fax from [facility] for medications to be sent. Please advise.  Requested Prescriptions     Pending Prescriptions Disp Refills    glipiZIDE (GLUCOTROL XL) 2.5 MG extended release tablet 100 tablet 2     Sig: Take 1 tablet by mouth every morning    Continuous Glucose  (DEXCOM G7 ) TYLER      triamcinolone (KENALOG) 0.1 % cream       Sig: Apply topically 2 times daily as needed    nebivolol (BYSTOLIC) 5 MG tablet 100 tablet 2     Sig: Take 1 tablet by mouth daily

## 2025-02-04 ENCOUNTER — HOSPITAL ENCOUNTER (OUTPATIENT)
Facility: HOSPITAL | Age: 85
Setting detail: SPECIMEN
Discharge: HOME | End: 2025-02-07
Payer: MEDICARE

## 2025-02-04 ENCOUNTER — OFFICE VISIT (OUTPATIENT)
Dept: FAMILY MEDICINE CLINIC | Facility: CLINIC | Age: 85
End: 2025-02-04
Payer: MEDICARE

## 2025-02-04 VITALS
BODY MASS INDEX: 26.37 KG/M2 | RESPIRATION RATE: 16 BRPM | OXYGEN SATURATION: 95 % | WEIGHT: 174 LBS | DIASTOLIC BLOOD PRESSURE: 78 MMHG | HEIGHT: 68 IN | SYSTOLIC BLOOD PRESSURE: 117 MMHG | HEART RATE: 77 BPM | TEMPERATURE: 97.6 F

## 2025-02-04 DIAGNOSIS — N18.30 TYPE 2 DIABETES MELLITUS WITH STAGE 3 CHRONIC KIDNEY DISEASE, WITH LONG-TERM CURRENT USE OF INSULIN, UNSPECIFIED WHETHER STAGE 3A OR 3B CKD (HCC): ICD-10-CM

## 2025-02-04 DIAGNOSIS — I10 ESSENTIAL (PRIMARY) HYPERTENSION: Primary | ICD-10-CM

## 2025-02-04 DIAGNOSIS — I10 ESSENTIAL (PRIMARY) HYPERTENSION: ICD-10-CM

## 2025-02-04 DIAGNOSIS — E78.2 MIXED HYPERLIPIDEMIA: ICD-10-CM

## 2025-02-04 DIAGNOSIS — E11.22 TYPE 2 DIABETES MELLITUS WITH STAGE 3 CHRONIC KIDNEY DISEASE, WITH LONG-TERM CURRENT USE OF INSULIN, UNSPECIFIED WHETHER STAGE 3A OR 3B CKD (HCC): ICD-10-CM

## 2025-02-04 DIAGNOSIS — L30.9 ECZEMA, UNSPECIFIED TYPE: ICD-10-CM

## 2025-02-04 DIAGNOSIS — Z79.4 TYPE 2 DIABETES MELLITUS WITH STAGE 3 CHRONIC KIDNEY DISEASE, WITH LONG-TERM CURRENT USE OF INSULIN, UNSPECIFIED WHETHER STAGE 3A OR 3B CKD (HCC): ICD-10-CM

## 2025-02-04 DIAGNOSIS — N40.1 BENIGN PROSTATIC HYPERPLASIA WITH LOWER URINARY TRACT SYMPTOMS, SYMPTOM DETAILS UNSPECIFIED: ICD-10-CM

## 2025-02-04 DIAGNOSIS — N18.32 CHRONIC KIDNEY DISEASE, STAGE 3B (HCC): ICD-10-CM

## 2025-02-04 LAB
ALT SERPL-CCNC: 26 U/L (ref 16–61)
CHOLEST SERPL-MCNC: 121 MG/DL
ERYTHROCYTE [DISTWIDTH] IN BLOOD BY AUTOMATED COUNT: 13.2 % (ref 11.6–14.5)
HCT VFR BLD AUTO: 49.4 % (ref 36–48)
HDLC SERPL-MCNC: 55 MG/DL (ref 40–60)
HDLC SERPL: 2.2 (ref 0–5)
HGB BLD-MCNC: 15.5 G/DL (ref 13–16)
LDLC SERPL CALC-MCNC: 45.8 MG/DL (ref 0–100)
LIPID PANEL: NORMAL
MCH RBC QN AUTO: 31.1 PG (ref 24–34)
MCHC RBC AUTO-ENTMCNC: 31.4 G/DL (ref 31–37)
MCV RBC AUTO: 99 FL (ref 78–100)
NRBC # BLD: 0 K/UL (ref 0–0.01)
NRBC BLD-RTO: 0 PER 100 WBC
PLATELET # BLD AUTO: 227 K/UL (ref 135–420)
PMV BLD AUTO: 10.6 FL (ref 9.2–11.8)
RBC # BLD AUTO: 4.99 M/UL (ref 4.35–5.65)
TRIGL SERPL-MCNC: 101 MG/DL
VLDLC SERPL CALC-MCNC: 20.2 MG/DL
WBC # BLD AUTO: 7.8 K/UL (ref 4.6–13.2)

## 2025-02-04 PROCEDURE — 3078F DIAST BP <80 MM HG: CPT | Performed by: INTERNAL MEDICINE

## 2025-02-04 PROCEDURE — 3074F SYST BP LT 130 MM HG: CPT | Performed by: INTERNAL MEDICINE

## 2025-02-04 PROCEDURE — 99214 OFFICE O/P EST MOD 30 MIN: CPT | Performed by: INTERNAL MEDICINE

## 2025-02-04 PROCEDURE — 84460 ALANINE AMINO (ALT) (SGPT): CPT

## 2025-02-04 PROCEDURE — 1123F ACP DISCUSS/DSCN MKR DOCD: CPT | Performed by: INTERNAL MEDICINE

## 2025-02-04 PROCEDURE — 80061 LIPID PANEL: CPT

## 2025-02-04 PROCEDURE — 85027 COMPLETE CBC AUTOMATED: CPT

## 2025-02-04 PROCEDURE — 36415 COLL VENOUS BLD VENIPUNCTURE: CPT

## 2025-02-04 RX ORDER — TRIAMCINOLONE ACETONIDE 1 MG/G
CREAM TOPICAL 2 TIMES DAILY PRN
Qty: 454 G | Refills: 3 | Status: SHIPPED | OUTPATIENT
Start: 2025-02-04

## 2025-02-04 SDOH — ECONOMIC STABILITY: FOOD INSECURITY: WITHIN THE PAST 12 MONTHS, THE FOOD YOU BOUGHT JUST DIDN'T LAST AND YOU DIDN'T HAVE MONEY TO GET MORE.: NEVER TRUE

## 2025-02-04 SDOH — ECONOMIC STABILITY: FOOD INSECURITY: WITHIN THE PAST 12 MONTHS, YOU WORRIED THAT YOUR FOOD WOULD RUN OUT BEFORE YOU GOT MONEY TO BUY MORE.: NEVER TRUE

## 2025-02-04 ASSESSMENT — PATIENT HEALTH QUESTIONNAIRE - PHQ9
SUM OF ALL RESPONSES TO PHQ9 QUESTIONS 1 & 2: 0
SUM OF ALL RESPONSES TO PHQ QUESTIONS 1-9: 0
SUM OF ALL RESPONSES TO PHQ QUESTIONS 1-9: 0
1. LITTLE INTEREST OR PLEASURE IN DOING THINGS: NOT AT ALL
SUM OF ALL RESPONSES TO PHQ QUESTIONS 1-9: 0
SUM OF ALL RESPONSES TO PHQ QUESTIONS 1-9: 0
2. FEELING DOWN, DEPRESSED OR HOPELESS: NOT AT ALL

## 2025-02-04 ASSESSMENT — ENCOUNTER SYMPTOMS
SHORTNESS OF BREATH: 0
ABDOMINAL PAIN: 0
COUGH: 0

## 2025-02-04 NOTE — PROGRESS NOTES
HISTORY OF PRESENT ILLNESS  Mahamed Troy is a 84 y.o. male    HPI  Hypertension, stable, blood pressure is controlled, he is on Lasix and Bystolic daily.  Hyperlipidemia, stable, on Lipitor 40 mg daily.  Eczema, stable, use Kenalog cream as needed.  BPH, fairly controlled, on Flomax daily, no nocturia.  CKD stage IIIb, stable, followed by nephrology.  Diabetes mellitus, stable, followed by endocrinology, his recent A1c was 7.4.  Recent labs done by endocrinology reviewed today with patient.    Review of Systems   Respiratory:  Negative for cough and shortness of breath.    Cardiovascular:  Negative for chest pain and palpitations.   Gastrointestinal:  Negative for abdominal pain.   Genitourinary:  Negative for frequency.   Musculoskeletal:  Negative for myalgias.   Neurological:  Negative for headaches.         Physical Exam  Vitals reviewed.   Cardiovascular:      Rate and Rhythm: Normal rate and regular rhythm.      Heart sounds: No murmur heard.  Pulmonary:      Effort: Pulmonary effort is normal.      Breath sounds: Normal breath sounds. No rales.   Abdominal:      Palpations: Abdomen is soft.      Tenderness: There is no abdominal tenderness.   Musculoskeletal:      Right lower leg: No edema.      Left lower leg: No edema.   Neurological:      Mental Status: He is oriented to person, place, and time.          ASSESSMENT and PLAN    1. Essential (primary) hypertension, stable, continue Bystolic and Lasix at current doses  -     CBC; Future  2. Mixed hyperlipidemia, stable, continue Lipitor 40 mg daily  -     Lipid Panel; Future  -     ALT; Future  3. Eczema, unspecified type, stable  -     triamcinolone (KENALOG) 0.1 % cream; Apply topically 2 times daily as needed (for eczema), Topical, 2 TIMES DAILY PRN Starting Tue 2/4/2025, Disp-454 g, R-3, Normal  4. Benign prostatic hyperplasia with lower urinary tract symptoms, symptom details unspecified, stable, continue Flomax 0.4 mg daily  5. Type 2 diabetes

## 2025-02-04 NOTE — PROGRESS NOTES
Mahamed Troy is a 84 y.o. male (: 1940) presenting to address:    Chief Complaint   Patient presents with    Medication Check       Vitals:    25 0907   BP: 117/78   Pulse: 77   Resp: 16   Temp: 97.6 °F (36.4 °C)   SpO2: 95%       \"Have you been to the ER, urgent care clinic since your last visit?  Hospitalized since your last visit?\"    NO    “Have you seen or consulted any other health care providers outside of [facility] since your last visit?”    YES - When: approximately 1 months ago.  Where and Why: Urology for Elevated PSA/Rheumatology for hand issues.

## 2025-03-04 LAB — HBA1C MFR BLD HPLC: 7.8 %

## 2025-06-04 ENCOUNTER — OFFICE VISIT (OUTPATIENT)
Dept: FAMILY MEDICINE CLINIC | Facility: CLINIC | Age: 85
End: 2025-06-04
Payer: MEDICARE

## 2025-06-04 ENCOUNTER — HOSPITAL ENCOUNTER (OUTPATIENT)
Facility: HOSPITAL | Age: 85
Setting detail: SPECIMEN
Discharge: HOME | End: 2025-06-07
Payer: MEDICARE

## 2025-06-04 VITALS
TEMPERATURE: 98.2 F | OXYGEN SATURATION: 99 % | WEIGHT: 164 LBS | HEART RATE: 64 BPM | BODY MASS INDEX: 24.86 KG/M2 | RESPIRATION RATE: 15 BRPM | DIASTOLIC BLOOD PRESSURE: 66 MMHG | HEIGHT: 68 IN | SYSTOLIC BLOOD PRESSURE: 132 MMHG

## 2025-06-04 DIAGNOSIS — N40.1 BENIGN PROSTATIC HYPERPLASIA WITH LOWER URINARY TRACT SYMPTOMS, SYMPTOM DETAILS UNSPECIFIED: ICD-10-CM

## 2025-06-04 DIAGNOSIS — I10 ESSENTIAL (PRIMARY) HYPERTENSION: ICD-10-CM

## 2025-06-04 DIAGNOSIS — E78.2 MIXED HYPERLIPIDEMIA: ICD-10-CM

## 2025-06-04 DIAGNOSIS — I10 ESSENTIAL (PRIMARY) HYPERTENSION: Primary | ICD-10-CM

## 2025-06-04 LAB
ALBUMIN SERPL-MCNC: 3.8 G/DL (ref 3.4–5)
ALBUMIN/GLOB SERPL: 1.6 (ref 0.8–1.7)
ALP SERPL-CCNC: 77 U/L (ref 45–117)
ALT SERPL-CCNC: 67 U/L (ref 10–50)
AST SERPL-CCNC: 37 U/L (ref 10–38)
BILIRUB DIRECT SERPL-MCNC: 0.3 MG/DL (ref 0–0.2)
BILIRUB SERPL-MCNC: 0.7 MG/DL (ref 0.2–1)
CHOLEST SERPL-MCNC: 110 MG/DL
GLOBULIN SER CALC-MCNC: 2.3 G/DL (ref 2–4)
HDLC SERPL-MCNC: 43 MG/DL (ref 40–60)
HDLC SERPL: 2.5 (ref 0–5)
LDLC SERPL CALC-MCNC: 54 MG/DL (ref 0–100)
PROT SERPL-MCNC: 6.1 G/DL (ref 6.4–8.2)
TRIGL SERPL-MCNC: 64 MG/DL (ref 0–150)
VLDLC SERPL CALC-MCNC: 13 MG/DL

## 2025-06-04 PROCEDURE — 80061 LIPID PANEL: CPT

## 2025-06-04 PROCEDURE — 3075F SYST BP GE 130 - 139MM HG: CPT | Performed by: INTERNAL MEDICINE

## 2025-06-04 PROCEDURE — 36415 COLL VENOUS BLD VENIPUNCTURE: CPT

## 2025-06-04 PROCEDURE — 1123F ACP DISCUSS/DSCN MKR DOCD: CPT | Performed by: INTERNAL MEDICINE

## 2025-06-04 PROCEDURE — 3078F DIAST BP <80 MM HG: CPT | Performed by: INTERNAL MEDICINE

## 2025-06-04 PROCEDURE — 99214 OFFICE O/P EST MOD 30 MIN: CPT | Performed by: INTERNAL MEDICINE

## 2025-06-04 PROCEDURE — 80076 HEPATIC FUNCTION PANEL: CPT

## 2025-06-04 RX ORDER — TAMSULOSIN HYDROCHLORIDE 0.4 MG/1
0.4 CAPSULE ORAL EVERY EVENING
Qty: 100 CAPSULE | Refills: 3 | Status: SHIPPED | OUTPATIENT
Start: 2025-06-04

## 2025-06-04 RX ORDER — ATORVASTATIN CALCIUM 40 MG/1
40 TABLET, FILM COATED ORAL NIGHTLY
Qty: 100 TABLET | Refills: 3 | Status: SHIPPED | OUTPATIENT
Start: 2025-06-04

## 2025-06-04 RX ORDER — FUROSEMIDE 20 MG/1
20 TABLET ORAL DAILY
Qty: 100 TABLET | Refills: 3 | Status: SHIPPED | OUTPATIENT
Start: 2025-06-04

## 2025-06-04 ASSESSMENT — ENCOUNTER SYMPTOMS
SHORTNESS OF BREATH: 0
ABDOMINAL PAIN: 0
COUGH: 0

## 2025-06-04 NOTE — PROGRESS NOTES
HISTORY OF PRESENT ILLNESS  Mahamed Troy is a 85 y.o. male    HPI  Hypertension, stable, blood pressure is controlled on Coreg and Lasix daily.  Hyperlipidemia, controlled, on Lipitor 40 mg daily.  BPH, fairly controlled, on Flomax daily.  He is followed by endocrinology and nephrology for his diabetes and CKD.    Review of Systems   Respiratory:  Negative for cough and shortness of breath.    Cardiovascular:  Negative for chest pain and palpitations.   Gastrointestinal:  Negative for abdominal pain.   Genitourinary:  Negative for dysuria.   Musculoskeletal:  Negative for myalgias.   Neurological:  Negative for dizziness and headaches.         Physical Exam  Vitals reviewed.   Cardiovascular:      Rate and Rhythm: Normal rate and regular rhythm.      Heart sounds: No murmur heard.  Pulmonary:      Effort: Pulmonary effort is normal.      Breath sounds: Normal breath sounds. No rales.   Abdominal:      Palpations: Abdomen is soft.      Tenderness: There is no abdominal tenderness.   Musculoskeletal:      Right lower leg: No edema.      Left lower leg: No edema.   Neurological:      Mental Status: He is oriented to person, place, and time.          ASSESSMENT and PLAN    1. Essential (primary) hypertension, controlled, continue Lasix and Coreg at current dose  -     Lipid Panel; Future  -     furosemide (LASIX) 20 MG tablet; Take 1 tablet by mouth daily, Disp-100 tablet, R-3Please send a replace/new response with 100-Day Supply if appropriate to maximize member benefit. Requesting 1 year supply.Normal  2. Mixed hyperlipidemia, controlled, continue Lipitor 40 mg daily  -     atorvastatin (LIPITOR) 40 MG tablet; Take 1 tablet by mouth nightly at bedtime., Disp-100 tablet, R-3Please send a replace/new response with 100-Day Supply if appropriate to maximize member benefit. Requesting 1 year supply.Normal  -     Lipid Panel; Future  -     Hepatic Function Panel; Future  3. Benign prostatic hyperplasia with lower urinary

## 2025-06-04 NOTE — PROGRESS NOTES
Mahamed Troy is a 85 y.o. male (: 1940) presenting to address:    Chief Complaint   Patient presents with    Medication Check       Vitals:    25 0936   BP: (!) 148/80   Pulse: 64   Resp: 15   Temp: 98.2 °F (36.8 °C)   SpO2: 99%       \"Have you been to the ER, urgent care clinic since your last visit?  Hospitalized since your last visit?\"    NO    “Have you seen or consulted any other health care providers outside of [facility] since your last visit?”    YES - When: approximately 10  weeks ago.  Where and Why: Mohs surgery on left neck .

## 2025-06-05 ENCOUNTER — RESULTS FOLLOW-UP (OUTPATIENT)
Dept: FAMILY MEDICINE CLINIC | Facility: CLINIC | Age: 85
End: 2025-06-05

## 2025-06-05 ENCOUNTER — PATIENT MESSAGE (OUTPATIENT)
Dept: FAMILY MEDICINE CLINIC | Facility: CLINIC | Age: 85
End: 2025-06-05

## 2025-06-05 DIAGNOSIS — R74.01 ELEVATED ALT MEASUREMENT: Primary | ICD-10-CM

## 2025-07-03 ENCOUNTER — HOSPITAL ENCOUNTER (OUTPATIENT)
Facility: HOSPITAL | Age: 85
Setting detail: SPECIMEN
Discharge: HOME | End: 2025-07-03
Payer: MEDICARE

## 2025-07-03 DIAGNOSIS — R74.01 ELEVATED ALT MEASUREMENT: ICD-10-CM

## 2025-07-03 LAB — ALT SERPL-CCNC: 87 U/L (ref 10–50)

## 2025-07-03 PROCEDURE — 84460 ALANINE AMINO (ALT) (SGPT): CPT

## 2025-07-03 PROCEDURE — 36415 COLL VENOUS BLD VENIPUNCTURE: CPT

## 2025-07-07 ENCOUNTER — RESULTS FOLLOW-UP (OUTPATIENT)
Dept: FAMILY MEDICINE CLINIC | Facility: CLINIC | Age: 85
End: 2025-07-07

## 2025-07-07 DIAGNOSIS — R74.8 ELEVATED LIVER ENZYMES: ICD-10-CM

## 2025-07-07 DIAGNOSIS — E78.2 MIXED HYPERLIPIDEMIA: Primary | ICD-10-CM

## 2025-07-15 ENCOUNTER — PATIENT MESSAGE (OUTPATIENT)
Dept: FAMILY MEDICINE CLINIC | Facility: CLINIC | Age: 85
End: 2025-07-15

## 2025-07-18 NOTE — TELEPHONE ENCOUNTER
Attempted to call dr. Vasquez clinical staff and left vm x2 awaiting a call back so the providers can discuss.

## 2025-08-01 ENCOUNTER — EMERGENCY VISIT (OUTPATIENT)
Age: 85
End: 2025-08-01

## 2025-08-01 ENCOUNTER — OFFICE VISIT (OUTPATIENT)
Dept: FAMILY MEDICINE CLINIC | Facility: CLINIC | Age: 85
End: 2025-08-01

## 2025-08-01 VITALS
WEIGHT: 172.2 LBS | BODY MASS INDEX: 26.1 KG/M2 | RESPIRATION RATE: 16 BRPM | OXYGEN SATURATION: 96 % | HEART RATE: 70 BPM | TEMPERATURE: 97.7 F | HEIGHT: 68 IN | DIASTOLIC BLOOD PRESSURE: 72 MMHG | SYSTOLIC BLOOD PRESSURE: 132 MMHG

## 2025-08-01 DIAGNOSIS — E78.2 MIXED HYPERLIPIDEMIA: ICD-10-CM

## 2025-08-01 DIAGNOSIS — I10 ESSENTIAL (PRIMARY) HYPERTENSION: Primary | ICD-10-CM

## 2025-08-01 DIAGNOSIS — H01.024: ICD-10-CM

## 2025-08-01 DIAGNOSIS — H10.45: ICD-10-CM

## 2025-08-01 DIAGNOSIS — H01.021: ICD-10-CM

## 2025-08-01 PROCEDURE — 99213 OFFICE O/P EST LOW 20 MIN: CPT

## 2025-08-01 RX ORDER — SODIUM BICARBONATE 650 MG/1
650 TABLET ORAL 2 TIMES DAILY
COMMUNITY

## 2025-08-01 RX ORDER — HYDRALAZINE HYDROCHLORIDE 50 MG/1
50 TABLET, FILM COATED ORAL 3 TIMES DAILY
Qty: 270 TABLET | Refills: 3 | Status: SHIPPED | OUTPATIENT
Start: 2025-08-01

## 2025-08-01 RX ORDER — PREDNISONE 2.5 MG/1
2.5 TABLET ORAL DAILY
COMMUNITY
Start: 2025-07-18

## 2025-08-01 ASSESSMENT — ENCOUNTER SYMPTOMS: SHORTNESS OF BREATH: 0

## 2025-08-01 NOTE — PROGRESS NOTES
HISTORY OF PRESENT ILLNESS  Mahamed Troy is a 85 y.o. male    HPI  Hypertension, not well-controlled, his blood pressure has been elevated at home in the last few days, around 170/90 on average, based on Bystolic daily, he took hydralazine 100 mg tablet last night from previous prescription, and his blood pressure improved this morning, his midodrine was also discontinued last week by nephrology  Hyperlipidemia, stable, he is back on Lipitor 40 mg daily, we held his Lipitor for a period of time since his liver enzymes were elevated but his rheumatologist thought that is most likely from his methotrexate so he lowered his dose to 10 mg daily for his rheumatoid arthritis    Review of Systems   Constitutional:  Negative for fever.   Respiratory:  Negative for shortness of breath.    Cardiovascular:  Negative for chest pain.   Neurological:  Negative for dizziness and headaches.         Physical Exam  Vitals reviewed.   Cardiovascular:      Rate and Rhythm: Normal rate and regular rhythm.      Heart sounds: No murmur heard.  Pulmonary:      Effort: Pulmonary effort is normal.      Breath sounds: Normal breath sounds.   Musculoskeletal:      Right lower leg: No edema.      Left lower leg: No edema.   Neurological:      Mental Status: He is oriented to person, place, and time.          ASSESSMENT and PLAN    1. Essential (primary) hypertension, not controlled, we will add hydralazine 50 mg 3 times daily, he will hold it if his BP is less than 140/90 and he will continue to monitor his blood pressure at home.  -     hydrALAZINE (APRESOLINE) 50 MG tablet; Take 1 tablet by mouth 3 times daily Hold if BP is  less than 140/90., Disp-270 tablet, R-3Normal  2. Mixed hyperlipidemia, stable, will continue Lipitor 40 mg daily and we will monitor his liver enzymes  -     Hepatic Function Panel; Future       Patient has follow-up appointment in 2 months, we will see him then, meanwhile he will monitor his blood pressure at

## 2025-08-01 NOTE — PROGRESS NOTES
Mahamed Troy is a 85 y.o. male (: 1940) presenting to address:    Chief Complaint   Patient presents with    Hypertension       Vitals:    25 1038   BP: (!) 147/76   Pulse: 70   Resp: 16   Temp: 97.7 °F (36.5 °C)   SpO2: 96%       \"Have you been to the ER, urgent care clinic since your last visit?  Hospitalized since your last visit?\"    NO    “Have you seen or consulted any other [facility]?”    NO

## 2025-08-04 ENCOUNTER — HOSPITAL ENCOUNTER (OUTPATIENT)
Facility: HOSPITAL | Age: 85
Setting detail: SPECIMEN
Discharge: HOME | End: 2025-08-07
Payer: MEDICARE

## 2025-08-04 DIAGNOSIS — E78.2 MIXED HYPERLIPIDEMIA: ICD-10-CM

## 2025-08-04 LAB
ALBUMIN SERPL-MCNC: 3.4 G/DL (ref 3.4–5)
ALBUMIN/GLOB SERPL: 1.4 (ref 0.8–1.7)
ALP SERPL-CCNC: 63 U/L (ref 45–117)
ALT SERPL-CCNC: 95 U/L (ref 10–50)
AST SERPL-CCNC: 80 U/L (ref 10–38)
BILIRUB DIRECT SERPL-MCNC: 0.2 MG/DL (ref 0–0.2)
BILIRUB SERPL-MCNC: 0.6 MG/DL (ref 0.2–1)
GLOBULIN SER CALC-MCNC: 2.5 G/DL (ref 2–4)
PROT SERPL-MCNC: 5.9 G/DL (ref 6.4–8.2)

## 2025-08-04 PROCEDURE — 36415 COLL VENOUS BLD VENIPUNCTURE: CPT

## 2025-08-04 PROCEDURE — 80076 HEPATIC FUNCTION PANEL: CPT

## 2025-08-12 ENCOUNTER — PATIENT MESSAGE (OUTPATIENT)
Dept: FAMILY MEDICINE CLINIC | Facility: CLINIC | Age: 85
End: 2025-08-12

## 2025-08-12 RX ORDER — LOSARTAN POTASSIUM AND HYDROCHLOROTHIAZIDE 12.5; 5 MG/1; MG/1
1 TABLET ORAL DAILY
Qty: 90 TABLET | Refills: 0 | Status: SHIPPED | OUTPATIENT
Start: 2025-08-12

## 2025-08-13 ENCOUNTER — PATIENT MESSAGE (OUTPATIENT)
Dept: FAMILY MEDICINE CLINIC | Facility: CLINIC | Age: 85
End: 2025-08-13

## 2025-08-13 DIAGNOSIS — L89.152 PRESSURE ULCER OF SACRAL REGION, STAGE 2 (HCC): Primary | ICD-10-CM

## 2025-08-26 ENCOUNTER — COMPREHENSIVE EXAM (OUTPATIENT)
Age: 85
End: 2025-08-26

## 2025-08-26 DIAGNOSIS — H16.143: ICD-10-CM

## 2025-08-26 DIAGNOSIS — B88.0: ICD-10-CM

## 2025-08-26 DIAGNOSIS — H40.013: ICD-10-CM

## 2025-08-26 DIAGNOSIS — H04.123: ICD-10-CM

## 2025-08-26 DIAGNOSIS — Z79.4: ICD-10-CM

## 2025-08-26 DIAGNOSIS — E11.9: ICD-10-CM

## 2025-08-26 PROCEDURE — 99214 OFFICE O/P EST MOD 30 MIN: CPT

## 2025-09-04 ENCOUNTER — PATIENT MESSAGE (OUTPATIENT)
Dept: FAMILY MEDICINE CLINIC | Facility: CLINIC | Age: 85
End: 2025-09-04

## 2025-09-05 ENCOUNTER — OFFICE VISIT (OUTPATIENT)
Dept: FAMILY MEDICINE CLINIC | Facility: CLINIC | Age: 85
End: 2025-09-05

## 2025-09-05 VITALS
DIASTOLIC BLOOD PRESSURE: 66 MMHG | TEMPERATURE: 98.2 F | HEART RATE: 71 BPM | HEIGHT: 68 IN | WEIGHT: 158 LBS | SYSTOLIC BLOOD PRESSURE: 105 MMHG | RESPIRATION RATE: 16 BRPM | OXYGEN SATURATION: 95 % | BODY MASS INDEX: 23.95 KG/M2

## 2025-09-05 DIAGNOSIS — B37.0 ORAL PHARYNGEAL CANDIDIASIS: Primary | ICD-10-CM

## 2025-09-05 DIAGNOSIS — I10 ESSENTIAL (PRIMARY) HYPERTENSION: ICD-10-CM

## 2025-09-05 DIAGNOSIS — J30.89 NON-SEASONAL ALLERGIC RHINITIS, UNSPECIFIED TRIGGER: ICD-10-CM

## 2025-09-05 DIAGNOSIS — H61.23 BILATERAL IMPACTED CERUMEN: ICD-10-CM

## 2025-09-05 RX ORDER — FLUCONAZOLE 100 MG/1
100 TABLET ORAL DAILY
Qty: 14 TABLET | Refills: 0 | Status: SHIPPED | OUTPATIENT
Start: 2025-09-05 | End: 2025-09-19

## 2025-09-05 RX ORDER — AMOXICILLIN 500 MG/1
CAPSULE ORAL
COMMUNITY
Start: 2025-09-01

## 2025-09-05 RX ORDER — NEOMYCIN SULFATE, POLYMYXIN B SULFATE, AND DEXAMETHASONE 3.5; 10000; 1 MG/G; [USP'U]/G; MG/G
OINTMENT OPHTHALMIC
COMMUNITY
Start: 2025-08-26

## 2025-09-05 RX ORDER — LOTILANER OPHTHALMIC SOLUTION 2.5 MG/ML
SOLUTION/ DROPS OPHTHALMIC
COMMUNITY
Start: 2025-08-26

## 2025-09-05 RX ORDER — FLUTICASONE PROPIONATE 50 MCG
2 SPRAY, SUSPENSION (ML) NASAL DAILY
Qty: 3 EACH | Refills: 3 | Status: SHIPPED | OUTPATIENT
Start: 2025-09-05

## 2025-09-05 ASSESSMENT — ENCOUNTER SYMPTOMS
SINUS PAIN: 0
SHORTNESS OF BREATH: 0
SINUS PRESSURE: 0